# Patient Record
Sex: FEMALE | Race: WHITE | NOT HISPANIC OR LATINO
[De-identification: names, ages, dates, MRNs, and addresses within clinical notes are randomized per-mention and may not be internally consistent; named-entity substitution may affect disease eponyms.]

---

## 2019-09-03 ENCOUNTER — RESULT REVIEW (OUTPATIENT)
Age: 74
End: 2019-09-03

## 2019-09-04 ENCOUNTER — OUTPATIENT (OUTPATIENT)
Dept: OUTPATIENT SERVICES | Facility: HOSPITAL | Age: 74
LOS: 1 days | End: 2019-09-04
Payer: MEDICARE

## 2019-09-04 DIAGNOSIS — E04.2 NONTOXIC MULTINODULAR GOITER: ICD-10-CM

## 2019-09-06 LAB
NON-GYNECOLOGICAL CYTOLOGY STUDY: SIGNIFICANT CHANGE UP
NON-GYNECOLOGICAL CYTOLOGY STUDY: SIGNIFICANT CHANGE UP

## 2019-09-16 PROBLEM — Z00.00 ENCOUNTER FOR PREVENTIVE HEALTH EXAMINATION: Status: ACTIVE | Noted: 2019-09-16

## 2019-10-03 ENCOUNTER — APPOINTMENT (OUTPATIENT)
Dept: OTOLARYNGOLOGY | Facility: CLINIC | Age: 74
End: 2019-10-03

## 2022-09-26 ENCOUNTER — EMERGENCY (EMERGENCY)
Facility: HOSPITAL | Age: 77
LOS: 1 days | Discharge: HOME | End: 2022-09-26
Attending: EMERGENCY MEDICINE | Admitting: EMERGENCY MEDICINE
Payer: MEDICARE

## 2022-09-26 VITALS
SYSTOLIC BLOOD PRESSURE: 135 MMHG | TEMPERATURE: 98 F | RESPIRATION RATE: 18 BRPM | DIASTOLIC BLOOD PRESSURE: 64 MMHG | HEART RATE: 98 BPM

## 2022-09-26 VITALS
OXYGEN SATURATION: 97 % | TEMPERATURE: 98 F | HEART RATE: 92 BPM | RESPIRATION RATE: 18 BRPM | DIASTOLIC BLOOD PRESSURE: 77 MMHG | SYSTOLIC BLOOD PRESSURE: 134 MMHG

## 2022-09-26 PROCEDURE — 70450 CT HEAD/BRAIN W/O DYE: CPT | Mod: MA

## 2022-09-26 PROCEDURE — 70450 CT HEAD/BRAIN W/O DYE: CPT | Mod: 26,MA

## 2022-09-26 PROCEDURE — 99284 EMERGENCY DEPT VISIT MOD MDM: CPT

## 2022-09-26 PROCEDURE — 99284 EMERGENCY DEPT VISIT MOD MDM: CPT | Mod: 25

## 2022-09-26 RX ORDER — APIXABAN 2.5 MG/1
2.5 TABLET, FILM COATED ORAL ONCE
Refills: 0 | Status: DISCONTINUED | OUTPATIENT
Start: 2022-09-26 | End: 2022-09-29

## 2022-09-26 NOTE — ED ADULT NURSE REASSESSMENT NOTE - NS ED NURSE REASSESS COMMENT FT1
Patient is ambulating well without assistance, awaiting transport back to rehab facility. Pt notified of imaging results.
Pt received from previous nurse AAOX4, spont unlab breathing on RA, NAD. PT has no complaints at this time, pt is discharged waiting ambulance txp back to rehab center.

## 2022-09-26 NOTE — ED ADULT NURSE NOTE - OBJECTIVE STATEMENT
Pt is a 77yoF presenting to the ED from [facility] with head injury s/p mech fall. Pt is awake and alert, axox3, spont unlabored breathing on RA. Patient reports that she slipped backwards, +HS, denies LOC. Pt has hematoma to the right side of the head, no active bleeding. Denies vision changes, no numbness/tingling, denies obvious deformity noted, ambulated after the accident. Denies nausea/vomiting, denies abd pain.

## 2022-09-26 NOTE — ED ADULT NURSE NOTE - HOW OFTEN DO YOU HAVE A DRINK CONTAINING ALCOHOL?
Never Aklief Pregnancy And Lactation Text: It is unknown if this medication is safe to use during pregnancy.  It is unknown if this medication is excreted in breast milk.  Breastfeeding women should use the topical cream on the smallest area of the skin for the shortest time needed while breastfeeding.  Do not apply to nipple and areola.

## 2022-09-26 NOTE — ED PROVIDER NOTE - NSFOLLOWUPINSTRUCTIONS_ED_ALL_ED_FT
Head injury    Return for increased pain, change in behavior, change in vision/speech/gait, numbness or weakness in extremities, vomiting, any other concerns.     Keep wound clean and dry.  Use antibiotic ointment on wound 3 times a day.  Return for increased pain, redness/swelling/drainage from wound, fever, any other concerns.     Closed Head Injury    A closed head injury is an injury to your head that may or may not involve a traumatic brain injury (TBI). Symptoms of TBI can be short or long lasting and include headache, dizziness, interference with memory or speech, fatigue, confusion, changes in sleep, mood changes, nausea, depression/anxiety, and dulling of senses. Make sure to obtain proper rest which includes getting plenty of sleep, avoiding excessive visual stimulation, and avoiding activities that may cause physical or mental stress. Avoid any situation where there is potential for another head injury, including sports.    SEEK IMMEDIATE MEDICAL CARE IF YOU HAVE ANY OF THE FOLLOWING SYMPTOMS: unusual drowsiness, vomiting, severe dizziness, seizures, lightheadedness, muscular weakness, different pupil sizes, visual changes, or clear or bloody discharge from your ears or nose.

## 2022-09-26 NOTE — ED PROVIDER NOTE - PATIENT PORTAL LINK FT
You can access the FollowMyHealth Patient Portal offered by [facility] by registering at the following website: [email or web address]. By joining [email or web address] FollowMyHealth portal, you will also be able to view your health information using other applications (apps) compatible with our system.

## 2022-09-26 NOTE — ED PROVIDER NOTE - OBJECTIVE STATEMENT
78 yo F c/o fall 78 yo F h/o afib on eliquis, cad s/p mi, htn, hld, non-hodgkin's lymphoma, c/o fall - pt lost balance when a door she leaned on started to move and fell backwards, struck head.  No loc, ha, change in vision/speech/gait, numbness or weakness in ext, neck/back pain.  + mild R elbow pain/abrasion.  No other injury.  No cp, sob, abd pain, n/v.

## 2022-09-26 NOTE — ED PROVIDER NOTE - NSICDXPASTMEDICALHX_GEN_ALL_CORE_FT
PAST MEDICAL HISTORY:  Afib     CAD (coronary artery disease)     HLD (hyperlipidemia)     HTN (hypertension)     Myocardial infarct     Non-Hodgkin's lymphoma

## 2022-09-28 DIAGNOSIS — Z79.02 LONG TERM (CURRENT) USE OF ANTITHROMBOTICS/ANTIPLATELETS: ICD-10-CM
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
## 2022-09-28 DIAGNOSIS — I48.91 UNSPECIFIED ATRIAL FIBRILLATION: ICD-10-CM

## 2022-09-28 DIAGNOSIS — Z79.82 LONG TERM (CURRENT) USE OF ASPIRIN: ICD-10-CM

## 2022-09-28 DIAGNOSIS — M25.521 PAIN IN RIGHT ELBOW: ICD-10-CM

## 2022-09-28 DIAGNOSIS — I25.10 ATHEROSCLEROTIC HEART DISEASE OF NATIVE CORONARY ARTERY WITHOUT ANGINA PECTORIS: ICD-10-CM

## 2022-09-28 DIAGNOSIS — Z88.5 ALLERGY STATUS TO NARCOTIC AGENT: ICD-10-CM

## 2022-09-28 DIAGNOSIS — S09.90XA UNSPECIFIED INJURY OF HEAD, INITIAL ENCOUNTER: ICD-10-CM

## 2022-09-28 DIAGNOSIS — Z85.71 PERSONAL HISTORY OF HODGKIN LYMPHOMA: ICD-10-CM

## 2022-09-28 DIAGNOSIS — I25.2 OLD MYOCARDIAL INFARCTION: ICD-10-CM
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
## 2022-09-28 DIAGNOSIS — Z79.01 LONG TERM (CURRENT) USE OF ANTICOAGULANTS: ICD-10-CM

## 2022-09-28 DIAGNOSIS — E78.5 HYPERLIPIDEMIA, UNSPECIFIED: ICD-10-CM

## 2023-07-01 ENCOUNTER — INPATIENT (INPATIENT)
Facility: HOSPITAL | Age: 78
LOS: 1 days | Discharge: HOME | DRG: 917 | End: 2023-07-03
Attending: HOSPITALIST | Admitting: STUDENT IN AN ORGANIZED HEALTH CARE EDUCATION/TRAINING PROGRAM
Payer: MEDICARE

## 2023-07-01 VITALS
OXYGEN SATURATION: 93 % | HEART RATE: 109 BPM | SYSTOLIC BLOOD PRESSURE: 109 MMHG | RESPIRATION RATE: 16 BRPM | WEIGHT: 100.09 LBS | HEIGHT: 66 IN | TEMPERATURE: 100 F | DIASTOLIC BLOOD PRESSURE: 67 MMHG

## 2023-07-01 DIAGNOSIS — R09.89 OTHER SPECIFIED SYMPTOMS AND SIGNS INVOLVING THE CIRCULATORY AND RESPIRATORY SYSTEMS: ICD-10-CM

## 2023-07-01 DIAGNOSIS — Z29.9 ENCOUNTER FOR PROPHYLACTIC MEASURES, UNSPECIFIED: ICD-10-CM

## 2023-07-01 DIAGNOSIS — I10 ESSENTIAL (PRIMARY) HYPERTENSION: ICD-10-CM

## 2023-07-01 DIAGNOSIS — R63.0 ANOREXIA: ICD-10-CM

## 2023-07-01 DIAGNOSIS — R41.82 ALTERED MENTAL STATUS, UNSPECIFIED: ICD-10-CM

## 2023-07-01 DIAGNOSIS — R00.0 TACHYCARDIA, UNSPECIFIED: ICD-10-CM

## 2023-07-01 DIAGNOSIS — I25.10 ATHEROSCLEROTIC HEART DISEASE OF NATIVE CORONARY ARTERY WITHOUT ANGINA PECTORIS: ICD-10-CM

## 2023-07-01 DIAGNOSIS — F32.9 MAJOR DEPRESSIVE DISORDER, SINGLE EPISODE, UNSPECIFIED: ICD-10-CM

## 2023-07-01 DIAGNOSIS — E03.9 HYPOTHYROIDISM, UNSPECIFIED: ICD-10-CM

## 2023-07-01 PROBLEM — E78.5 HYPERLIPIDEMIA, UNSPECIFIED: Chronic | Status: ACTIVE | Noted: 2022-09-26

## 2023-07-01 PROBLEM — I21.9 ACUTE MYOCARDIAL INFARCTION, UNSPECIFIED: Chronic | Status: ACTIVE | Noted: 2022-09-26

## 2023-07-01 LAB
ALBUMIN SERPL ELPH-MCNC: 4 G/DL — SIGNIFICANT CHANGE UP (ref 3.3–5)
ALP SERPL-CCNC: 56 U/L — SIGNIFICANT CHANGE UP (ref 40–120)
ALT FLD-CCNC: 12 U/L — SIGNIFICANT CHANGE UP (ref 10–45)
AMPHET UR-MCNC: NEGATIVE — SIGNIFICANT CHANGE UP
ANION GAP SERPL CALC-SCNC: 10 MMOL/L — SIGNIFICANT CHANGE UP (ref 5–17)
ANISOCYTOSIS BLD QL: SLIGHT — SIGNIFICANT CHANGE UP
APAP SERPL-MCNC: <5 UG/ML — LOW (ref 10–30)
APPEARANCE UR: CLEAR — SIGNIFICANT CHANGE UP
APTT BLD: 29.3 SEC — SIGNIFICANT CHANGE UP (ref 27.5–35.5)
AST SERPL-CCNC: 20 U/L — SIGNIFICANT CHANGE UP (ref 10–40)
BARBITURATES UR SCN-MCNC: NEGATIVE — SIGNIFICANT CHANGE UP
BASE EXCESS BLDV CALC-SCNC: 4.5 MMOL/L — HIGH (ref -2–3)
BASOPHILS # BLD AUTO: 0 K/UL — SIGNIFICANT CHANGE UP (ref 0–0.2)
BASOPHILS NFR BLD AUTO: 0 % — SIGNIFICANT CHANGE UP (ref 0–2)
BENZODIAZ UR-MCNC: NEGATIVE — SIGNIFICANT CHANGE UP
BILIRUB SERPL-MCNC: 0.4 MG/DL — SIGNIFICANT CHANGE UP (ref 0.2–1.2)
BILIRUB UR-MCNC: NEGATIVE — SIGNIFICANT CHANGE UP
BUN SERPL-MCNC: 16 MG/DL — SIGNIFICANT CHANGE UP (ref 7–23)
CA-I SERPL-SCNC: 1.21 MMOL/L — SIGNIFICANT CHANGE UP (ref 1.15–1.33)
CALCIUM SERPL-MCNC: 9.5 MG/DL — SIGNIFICANT CHANGE UP (ref 8.4–10.5)
CHLORIDE SERPL-SCNC: 103 MMOL/L — SIGNIFICANT CHANGE UP (ref 96–108)
CK SERPL-CCNC: 60 U/L — SIGNIFICANT CHANGE UP (ref 25–170)
CO2 BLDV-SCNC: 31.2 MMOL/L — HIGH (ref 22–26)
CO2 SERPL-SCNC: 28 MMOL/L — SIGNIFICANT CHANGE UP (ref 22–31)
COCAINE METAB.OTHER UR-MCNC: NEGATIVE — SIGNIFICANT CHANGE UP
COLOR SPEC: YELLOW — SIGNIFICANT CHANGE UP
CREAT SERPL-MCNC: 0.83 MG/DL — SIGNIFICANT CHANGE UP (ref 0.5–1.3)
DIFF PNL FLD: NEGATIVE — SIGNIFICANT CHANGE UP
EGFR: 73 ML/MIN/1.73M2 — SIGNIFICANT CHANGE UP
EOSINOPHIL # BLD AUTO: 0 K/UL — SIGNIFICANT CHANGE UP (ref 0–0.5)
EOSINOPHIL NFR BLD AUTO: 0 % — SIGNIFICANT CHANGE UP (ref 0–6)
ETHANOL SERPL-MCNC: <10 MG/DL — SIGNIFICANT CHANGE UP (ref 0–10)
GAS PNL BLDV: 140 MMOL/L — SIGNIFICANT CHANGE UP (ref 136–145)
GAS PNL BLDV: SIGNIFICANT CHANGE UP
GIANT PLATELETS BLD QL SMEAR: PRESENT — SIGNIFICANT CHANGE UP
GLUCOSE SERPL-MCNC: 117 MG/DL — HIGH (ref 70–99)
GLUCOSE UR QL: NEGATIVE — SIGNIFICANT CHANGE UP
HCO3 BLDV-SCNC: 30 MMOL/L — HIGH (ref 22–29)
HCT VFR BLD CALC: 35.4 % — SIGNIFICANT CHANGE UP (ref 34.5–45)
HGB BLD-MCNC: 11.8 G/DL — SIGNIFICANT CHANGE UP (ref 11.5–15.5)
INR BLD: 1.33 — HIGH (ref 0.88–1.16)
KETONES UR-MCNC: NEGATIVE — SIGNIFICANT CHANGE UP
LACTATE SERPL-SCNC: 0.8 MMOL/L — SIGNIFICANT CHANGE UP (ref 0.5–2)
LEUKOCYTE ESTERASE UR-ACNC: NEGATIVE — SIGNIFICANT CHANGE UP
LYMPHOCYTES # BLD AUTO: 0.41 K/UL — LOW (ref 1–3.3)
LYMPHOCYTES # BLD AUTO: 5.2 % — LOW (ref 13–44)
MACROCYTES BLD QL: SLIGHT — SIGNIFICANT CHANGE UP
MAGNESIUM SERPL-MCNC: 2.3 MG/DL — SIGNIFICANT CHANGE UP (ref 1.6–2.6)
MANUAL SMEAR VERIFICATION: SIGNIFICANT CHANGE UP
MCHC RBC-ENTMCNC: 32.7 PG — SIGNIFICANT CHANGE UP (ref 27–34)
MCHC RBC-ENTMCNC: 33.3 GM/DL — SIGNIFICANT CHANGE UP (ref 32–36)
MCV RBC AUTO: 98.1 FL — SIGNIFICANT CHANGE UP (ref 80–100)
METHADONE UR-MCNC: NEGATIVE — SIGNIFICANT CHANGE UP
MONOCYTES # BLD AUTO: 0.82 K/UL — SIGNIFICANT CHANGE UP (ref 0–0.9)
MONOCYTES NFR BLD AUTO: 10.4 % — SIGNIFICANT CHANGE UP (ref 2–14)
NEUTROPHILS # BLD AUTO: 6.64 K/UL — SIGNIFICANT CHANGE UP (ref 1.8–7.4)
NEUTROPHILS NFR BLD AUTO: 84.4 % — HIGH (ref 43–77)
NITRITE UR-MCNC: NEGATIVE — SIGNIFICANT CHANGE UP
OPIATES UR-MCNC: NEGATIVE — SIGNIFICANT CHANGE UP
OVALOCYTES BLD QL SMEAR: SLIGHT — SIGNIFICANT CHANGE UP
PCO2 BLDV: 46 MMHG — HIGH (ref 39–42)
PCP SPEC-MCNC: SIGNIFICANT CHANGE UP
PCP UR-MCNC: NEGATIVE — SIGNIFICANT CHANGE UP
PH BLDV: 7.42 — SIGNIFICANT CHANGE UP (ref 7.32–7.43)
PH UR: 7.5 — SIGNIFICANT CHANGE UP (ref 5–8)
PLAT MORPH BLD: ABNORMAL
PLATELET # BLD AUTO: 198 K/UL — SIGNIFICANT CHANGE UP (ref 150–400)
PO2 BLDV: 59 MMHG — HIGH (ref 25–45)
POIKILOCYTOSIS BLD QL AUTO: SLIGHT — SIGNIFICANT CHANGE UP
POTASSIUM BLDV-SCNC: 4 MMOL/L — SIGNIFICANT CHANGE UP (ref 3.5–5.1)
POTASSIUM SERPL-MCNC: 4.2 MMOL/L — SIGNIFICANT CHANGE UP (ref 3.5–5.3)
POTASSIUM SERPL-SCNC: 4.2 MMOL/L — SIGNIFICANT CHANGE UP (ref 3.5–5.3)
PROT SERPL-MCNC: 7 G/DL — SIGNIFICANT CHANGE UP (ref 6–8.3)
PROT UR-MCNC: NEGATIVE MG/DL — SIGNIFICANT CHANGE UP
PROTHROM AB SERPL-ACNC: 15.9 SEC — HIGH (ref 10.5–13.4)
RBC # BLD: 3.61 M/UL — LOW (ref 3.8–5.2)
RBC # FLD: 12.6 % — SIGNIFICANT CHANGE UP (ref 10.3–14.5)
RBC BLD AUTO: ABNORMAL
SALICYLATES SERPL-MCNC: <0.3 MG/DL — LOW (ref 2.8–20)
SAO2 % BLDV: 93.4 % — HIGH (ref 67–88)
SODIUM SERPL-SCNC: 141 MMOL/L — SIGNIFICANT CHANGE UP (ref 135–145)
SP GR SPEC: 1.02 — SIGNIFICANT CHANGE UP (ref 1–1.03)
THC UR QL: NEGATIVE — SIGNIFICANT CHANGE UP
TROPONIN T, HIGH SENSITIVITY RESULT: 21 NG/L — SIGNIFICANT CHANGE UP (ref 0–51)
UROBILINOGEN FLD QL: 0.2 E.U./DL — SIGNIFICANT CHANGE UP
WBC # BLD: 7.87 K/UL — SIGNIFICANT CHANGE UP (ref 3.8–10.5)
WBC # FLD AUTO: 7.87 K/UL — SIGNIFICANT CHANGE UP (ref 3.8–10.5)

## 2023-07-01 PROCEDURE — 99285 EMERGENCY DEPT VISIT HI MDM: CPT

## 2023-07-01 PROCEDURE — 70450 CT HEAD/BRAIN W/O DYE: CPT | Mod: 26,MA

## 2023-07-01 PROCEDURE — 71045 X-RAY EXAM CHEST 1 VIEW: CPT | Mod: 26

## 2023-07-01 PROCEDURE — 99223 1ST HOSP IP/OBS HIGH 75: CPT

## 2023-07-01 RX ORDER — LEVOTHYROXINE SODIUM 125 MCG
25 TABLET ORAL DAILY
Refills: 0 | Status: DISCONTINUED | OUTPATIENT
Start: 2023-07-01 | End: 2023-07-03

## 2023-07-01 RX ORDER — CARVEDILOL PHOSPHATE 80 MG/1
3.12 CAPSULE, EXTENDED RELEASE ORAL ONCE
Refills: 0 | Status: COMPLETED | OUTPATIENT
Start: 2023-07-01 | End: 2023-07-01

## 2023-07-01 RX ORDER — APIXABAN 2.5 MG/1
0 TABLET, FILM COATED ORAL
Qty: 0 | Refills: 0 | DISCHARGE

## 2023-07-01 RX ORDER — ATORVASTATIN CALCIUM 80 MG/1
10 TABLET, FILM COATED ORAL AT BEDTIME
Refills: 0 | Status: DISCONTINUED | OUTPATIENT
Start: 2023-07-01 | End: 2023-07-03

## 2023-07-01 RX ORDER — ASPIRIN/CALCIUM CARB/MAGNESIUM 324 MG
0 TABLET ORAL
Qty: 0 | Refills: 0 | DISCHARGE

## 2023-07-01 RX ORDER — ASPIRIN/CALCIUM CARB/MAGNESIUM 324 MG
81 TABLET ORAL ONCE
Refills: 0 | Status: COMPLETED | OUTPATIENT
Start: 2023-07-01 | End: 2023-07-01

## 2023-07-01 RX ORDER — ACETAMINOPHEN 500 MG
650 TABLET ORAL EVERY 6 HOURS
Refills: 0 | Status: DISCONTINUED | OUTPATIENT
Start: 2023-07-01 | End: 2023-07-03

## 2023-07-01 RX ORDER — ATORVASTATIN CALCIUM 80 MG/1
0 TABLET, FILM COATED ORAL
Qty: 0 | Refills: 0 | DISCHARGE

## 2023-07-01 RX ORDER — CARVEDILOL PHOSPHATE 80 MG/1
0 CAPSULE, EXTENDED RELEASE ORAL
Qty: 0 | Refills: 0 | DISCHARGE

## 2023-07-01 RX ORDER — CLOPIDOGREL BISULFATE 75 MG/1
75 TABLET, FILM COATED ORAL DAILY
Refills: 0 | Status: DISCONTINUED | OUTPATIENT
Start: 2023-07-01 | End: 2023-07-03

## 2023-07-01 RX ORDER — LANOLIN ALCOHOL/MO/W.PET/CERES
3 CREAM (GRAM) TOPICAL AT BEDTIME
Refills: 0 | Status: DISCONTINUED | OUTPATIENT
Start: 2023-07-01 | End: 2023-07-03

## 2023-07-01 RX ORDER — ENOXAPARIN SODIUM 100 MG/ML
30 INJECTION SUBCUTANEOUS EVERY 24 HOURS
Refills: 0 | Status: DISCONTINUED | OUTPATIENT
Start: 2023-07-01 | End: 2023-07-03

## 2023-07-01 RX ORDER — MIRTAZAPINE 45 MG/1
0 TABLET, ORALLY DISINTEGRATING ORAL
Qty: 0 | Refills: 0 | DISCHARGE

## 2023-07-01 RX ORDER — CLOPIDOGREL BISULFATE 75 MG/1
0 TABLET, FILM COATED ORAL
Qty: 0 | Refills: 0 | DISCHARGE

## 2023-07-01 RX ORDER — VENLAFAXINE HCL 75 MG
0 CAPSULE, EXT RELEASE 24 HR ORAL
Qty: 0 | Refills: 0 | DISCHARGE

## 2023-07-01 RX ORDER — SODIUM CHLORIDE 9 MG/ML
250 INJECTION INTRAMUSCULAR; INTRAVENOUS; SUBCUTANEOUS ONCE
Refills: 0 | Status: COMPLETED | OUTPATIENT
Start: 2023-07-01 | End: 2023-07-01

## 2023-07-01 RX ORDER — SACUBITRIL AND VALSARTAN 24; 26 MG/1; MG/1
1 TABLET, FILM COATED ORAL
Refills: 0 | Status: DISCONTINUED | OUTPATIENT
Start: 2023-07-01 | End: 2023-07-01

## 2023-07-01 RX ORDER — SODIUM CHLORIDE 9 MG/ML
1000 INJECTION INTRAMUSCULAR; INTRAVENOUS; SUBCUTANEOUS ONCE
Refills: 0 | Status: COMPLETED | OUTPATIENT
Start: 2023-07-01 | End: 2023-07-01

## 2023-07-01 RX ORDER — ACETAMINOPHEN 500 MG
650 TABLET ORAL ONCE
Refills: 0 | Status: COMPLETED | OUTPATIENT
Start: 2023-07-01 | End: 2023-07-01

## 2023-07-01 RX ADMIN — CLOPIDOGREL BISULFATE 75 MILLIGRAM(S): 75 TABLET, FILM COATED ORAL at 16:08

## 2023-07-01 RX ADMIN — SODIUM CHLORIDE 500 MILLILITER(S): 9 INJECTION INTRAMUSCULAR; INTRAVENOUS; SUBCUTANEOUS at 19:35

## 2023-07-01 RX ADMIN — ENOXAPARIN SODIUM 30 MILLIGRAM(S): 100 INJECTION SUBCUTANEOUS at 22:46

## 2023-07-01 RX ADMIN — Medication 650 MILLIGRAM(S): at 17:04

## 2023-07-01 RX ADMIN — SODIUM CHLORIDE 1000 MILLILITER(S): 9 INJECTION INTRAMUSCULAR; INTRAVENOUS; SUBCUTANEOUS at 12:46

## 2023-07-01 RX ADMIN — Medication 650 MILLIGRAM(S): at 20:10

## 2023-07-01 RX ADMIN — SACUBITRIL AND VALSARTAN 1 TABLET(S): 24; 26 TABLET, FILM COATED ORAL at 17:04

## 2023-07-01 RX ADMIN — Medication 81 MILLIGRAM(S): at 16:09

## 2023-07-01 RX ADMIN — CARVEDILOL PHOSPHATE 3.12 MILLIGRAM(S): 80 CAPSULE, EXTENDED RELEASE ORAL at 16:08

## 2023-07-01 RX ADMIN — ATORVASTATIN CALCIUM 10 MILLIGRAM(S): 80 TABLET, FILM COATED ORAL at 22:47

## 2023-07-01 RX ADMIN — Medication 3 MILLIGRAM(S): at 22:48

## 2023-07-01 NOTE — ED ADULT NURSE NOTE - OBJECTIVE STATEMENT
patient comes in with AMS from home, son unable to reach via phone, patient was found lethargic, had episode of unresponsiveness, PERRLA, not pinpoint, patient responding appropriately to questions, denies SI,SA, AH, CH, HI, does not recall anything this morning, no obvious signs of trauma, no focal deficits

## 2023-07-01 NOTE — H&P ADULT - PROBLEM SELECTOR PLAN 2
HR elevated to 103.     Plan: HR elevated to 125->103. EKG on admission normal sinus. Troponin negative. Etiology unclear, possible sepsis vs dehydration vs anxiety vs metabolic.     Plan:  Repeat EKG in AM if persistently tachycardic  - F/u TSH  - Hold anxiety medications given AMS  - F/u blood cultures

## 2023-07-01 NOTE — H&P ADULT - ATTENDING COMMENTS
78 yo F with PMHx anxiety, depression, anorexia nervosa BIBEMS from home after being found down lethargic next to empty bottle of mirtazapine admitted for possible unintentional overdose.      #Overdose – Pt denies intentional overdose, SI sx. Lethargic on the scene but by ED arrival, awake/alert. Per son, still not at baseline and remains confused. No focal neurological deficits on exam. Outpt pychiatrist contacted by ED, no response on vacation. UTox, Tylenol, Salicylate, BAL negative. Enhanced obs. Psychiatry consult in AM. Hold sedating medications. Monitor for BZD withdrawal.      #Dysphagia – Failed bedside dysphagia in ED. Awake/alert following commands and protecting airway. Speech/swallow consult in AM.      #Misc – PT consult      Agree with resident plan as above.

## 2023-07-01 NOTE — H&P ADULT - PROBLEM SELECTOR PLAN 5
HX of depression.     Plan   -C/w home Mirtazapine, Olanzepine, Viibryd, Klonopin HX of depression.     Plan   -hold home Mirtazapine, Olanzepine, Viibryd, Klonopin

## 2023-07-01 NOTE — H&P ADULT - PROBLEM SELECTOR PLAN 3
BP ranges from 188/104 to 88/53 measured at bedside. s/p carvedilol, Entresto and 1L NS in the ED    Plan:   -NS 250cc bolus and reassess BP ranges from 188/104 to 88/53 measured at bedside. s/p carvedilol, Entresto and 1L NS in the ED. Pt mentating well, asymptomatic.    Plan:   -NS 250cc bolus and reassess  - Hold off on home antihypertensives given systolics in 80-90s

## 2023-07-01 NOTE — H&P ADULT - ASSESSMENT
Patient is a 77y.o F with PMH of MI (Sept 2022), depression, anorexia who present with altered mental status since today. Patient is a 77y.o F with PMH of MI (Sept 2022), depression, anorexia who present with altered mental status since today after being found lethargic at home by EMS with 2 empty bottles of medications next to her. She was found to have Utox negative, WBC wnm, afebrile, negative CXR, negative CT head, Pt was admitted for AMS.

## 2023-07-01 NOTE — ED ADULT TRIAGE NOTE - CHIEF COMPLAINT QUOTE
Pt presents to ED C/O AMS EMS states, "She was found at home lethargic with two empty bottles of medication next to her, one unlabeled and one was mirtazapine, the patient friend said she has hx of depression, she went unresponsive on scene but still had a pulse". Upon arrival pt awake, lethargic A&Ox4, protecting own airway, denies drug or alcohol use, denies SI,HI.

## 2023-07-01 NOTE — H&P ADULT - PROBLEM SELECTOR PLAN 8
F: s/p 1.25 L in ED  E: Replete as needed  N; Clear liquid diet pending speech and swallow  Dvt ppx: lovenox  Code status: FULL code  Dispo: F

## 2023-07-01 NOTE — H&P ADULT - NSHPPHYSICALEXAM_GEN_ALL_CORE
T(C): 37.3 (07-01-23 @ 19:13), Max: 37.8 (07-01-23 @ 11:03)  HR: 103 (07-01-23 @ 19:13) (96 - 125)  BP: 93/57 (07-01-23 @ 19:13) (93/57 - 188/104)  RR: 16 (07-01-23 @ 19:13) (16 - 18)  SpO2: 96% (07-01-23 @ 19:13) (93% - 98%)    CONSTITUTIONAL: Well groomed, no apparent distress. Small body habitus   EYES: PERRLA and symmetric, EOMI, No conjunctival or scleral injection, non-icteric  ENMT: Oral mucosa with moist membranes. Normal dentition; no pharyngeal injection or exudates             NECK: Supple, symmetric and without tracheal deviation   RESP: No respiratory distress, no use of accessory muscles; CTA b/l, no WRR  CV: RRR, +S1S2, no MRG; no JVD; no peripheral edema  GI: Soft, NT, ND, no rebound, no guarding; no palpable masses; no hepatosplenomegaly; no hernia palpated  LYMPH: No cervical LAD or tenderness; no axillary LAD or tenderness; no inguinal LAD or tenderness  MSK: Normal gait; No digital clubbing or cyanosis; examination of the (head/neck/spine/ribs/pelvis, RUE, LUE, RLE, LLE) without misalignment,            Normal ROM without pain, no spinal tenderness, normal muscle strength/tone  SKIN: No rashes or ulcers noted; no subcutaneous nodules or induration palpable  NEURO: CN II-XII intact; normal reflexes in upper and lower extremities, sensation intact in upper and lower extremities b/l to light touch   PSYCH: Appropriate insight/judgment; A+O x 3, mood and affect appropriate, recent/remote memory intact T(C): 37.3 (07-01-23 @ 19:13), Max: 37.8 (07-01-23 @ 11:03)  HR: 103 (07-01-23 @ 19:13) (96 - 125)  BP: 93/57 (07-01-23 @ 19:13) (93/57 - 188/104)  RR: 16 (07-01-23 @ 19:13) (16 - 18)  SpO2: 96% (07-01-23 @ 19:13) (93% - 98%)    CONSTITUTIONAL: Well groomed, no apparent distress. Small body habitus   EYES: PERRLA and symmetric, EOMI, No conjunctival or scleral injection, non-icteric  HEAD: evidence of prior lip fillings and botox injections  ENMT: Oral mucosa with moist membranes. Normal dentition; no pharyngeal injection or exudates             NECK: Supple, symmetric and without tracheal deviation   RESP: No respiratory distress, no use of accessory muscles; CTA b/l, no WRR  CV: RRR, +S1S2, no MRG; no JVD; no peripheral edema  GI: Soft, NT, ND, no rebound, no guarding; no palpable masses; no hepatosplenomegaly; no hernia palpated  LYMPH: No cervical LAD or tenderness; no axillary LAD or tenderness; no inguinal LAD or tenderness  MSK: No digital clubbing or cyanosis; examination of the (head/neck/spine/ribs/pelvis, RUE, LUE, RLE, LLE) without misalignment,   SKIN: No rashes or ulcers noted; no subcutaneous nodules or induration palpable  NEURO: CN II-XII intact; normal reflexes in upper and lower extremities, sensation intact in upper and lower extremities b/l to light touch, no focal neuro deficit  PSYCH: Appropriate insight/judgment; A+O x 3, mood and affect appropriate, recent/remote memory intact

## 2023-07-01 NOTE — H&P ADULT - PROBLEM SELECTOR PLAN 4
HX of MI in Sep 2022.     Plan:   -C/w home atorvastatin and clopidogrel HX of MI in Sep 2022. No stents placed.    Plan:   -C/w home atorvastatin and clopidogrel

## 2023-07-01 NOTE — ED PROVIDER NOTE - OBJECTIVE STATEMENT
2 empty bottles in room, one of mirtazipine, pt denied OD to EMS and to us.  our system shows aspirin, atorvastatin, carvedilol, clonazepam, clopidogrel, eliquis and prilosec. neither patient or son remember any other meds. call left w/ psychiatrist, currently away on vacation  pt denies intentional overdose, states could have accidentally taken an extra dose but doesn't recall. denies etoh/drug use. 77y.o F with PMH of MI (Sept 2022), depression followed by psych with many med changes over the years (most recent is increase in remeron from 30 to 60 at night), anorexia that present with altered mental status since today. Per her son, the patient's friend went to check on her today and found her in bed, awake but not responding to questions. She then was brought here to the ED by EMS. EMS states no seizure like activity, found pt in bed naked. Pt recalls this, states she woke up and the lights were on and people were all around her. Son reports that patient didn't  the phone all morning so he called a wellness check on her. He confirms that she lives alone, and at baseline is able to ambulate without assistance. Both she and son deny hx of suicidal attempts or ideation. Pt states that remeron bottle empty because of increased dose, maybe took an extra one by accident but doesn't recall doing so, but denies intentional overdose. States her depression is long standing and while distressing, has been stable.     2 empty bottles in room, one of mirtazipine, pt denied OD to EMS and to us.  our system shows aspirin, atorvastatin, carvedilol, clonazepam, clopidogrel, eliquis and prilosec. neither patient or son remember any other meds. call left w/ psychiatrist, currently away on vacation  pt denies intentional overdose, states could have accidentally taken an extra dose but doesn't recall. denies etoh/drug use.

## 2023-07-01 NOTE — H&P ADULT - HISTORY OF PRESENT ILLNESS
Patient is a 77y.o F with PMH of MI (Sept 2022), depression, anorexia that present with altered mental status since today. Per her son, the patient's friend went to check on her today and found her in bed, awake but not responding to questions. She then was brought here to the ED by EMS. He reports that patient lives alone, and at baseline is able to ambulate without assistance. Since coming to the ED, he reports that he has been able to speak to her and she seemed confused. When trying to ambulate in the ED, she seemed to  Patient is a 77y.o F with PMH of MI (Sept 2022), depression, anorexia that present with altered mental status since today. Per her son, the patient's friend went to check on her today and found her in bed, awake but not responding to questions. She then was brought here to the ED by EMS. He reports that patient lives alone, and at baseline is able to ambulate without assistance . Since coming to the ED, he reports that he has been able to speak to her and she seemed confused. When trying to ambulate in the ED, she seemed to lose balance. He denies known SI or suicide attempts but does report that she has had worsening depression in the last few months requiring changes to her meds.   When reassessing patient, she is now alert and oriented, She notes she spoke to her son earlier today, and last thing she remembers is being in bed in the morning and then waking up to the EMS in her room. She has never had a similar episode before.     In the ED:   VS: /67, , T 100, RR 18, SPO2 96 RA  Labs: Significant for PT 15.9, INR 1.33,  Of note: Electrolytes WNL, UA, Utox-, Troponin wnl, CK-   She was received Aspirin, Carvedilol Tylenol, clopidogrel, Entresto and 1 L IV NS  Patient is a 77y.o F with PMH of MI (Sept 2022), depression, anorexia that present with altered mental status since today. Per EMS, she was found at home lethargic with two empty bottles of medication next to her, one unlabeled and one was mirtazapine. Per the patient's son, the patient's friend went to check on her today and found her in bed, awake but not responding to questions. She then was brought here to the ED by EMS. He reports that patient lives alone, and at baseline is able to ambulate without assistance . Since coming to the ED, he reports that he has been able to speak to her and she seemed confused. When trying to ambulate in the ED, she seemed to lose balance. He denies known SI or suicide attempts but does report that she has had worsening depression in the last few months requiring changes to her meds.   When reassessing patient, she is now alert and oriented, She notes she spoke to her son earlier today, and last thing she remembers is being in bed in the morning and then waking up to the EMS in her room. She has never had a similar episode before.     In the ED:   VS: /67, , T 100, RR 18, SPO2 96 RA  Labs: Significant for PT 15.9, INR 1.33,  Of note: Electrolytes WNL, UA, Utox-, Troponin wnl, CK-   She was received Aspirin, Carvedilol Tylenol, clopidogrel, Entresto and 1 L IV NS

## 2023-07-01 NOTE — H&P ADULT - PROBLEM SELECTOR PLAN 7
Hx of hypothyroidism    Plan:   -Cont with Levothyroxine 25mcg Hx of hypothyroidism    Plan:   -Cont with Levothyroxine 25mcg  - F/u TSH in AM

## 2023-07-01 NOTE — ED PROVIDER NOTE - CLINICAL SUMMARY MEDICAL DECISION MAKING FREE TEXT BOX
workup in ED neg  clinical exam labs and VS not consistent with overdose but she may have been confused 2/2 new increase in remeron  pt did not take any of her home meds today, both she and son very distressed about missing her cardiac meds, later during ED stay became more tachycardic and hypertensive, so pts home dose carvedilol and entresto and asa/plavix ordered.  unable to ambulate. ct head done and negative.   pt appears much more dehydrated than I would expect from one night. may be failing much more at home than son realizes. SW/CM and PT involved from ED. infection still on differential though ED workup negative - blood cultures were sent.   initally in ED stay pt A&Ox4, but later on was confused - likely delirium given waxing/waning. needs admission.

## 2023-07-01 NOTE — ED PROVIDER NOTE - PHYSICAL EXAMINATION
CONSTITUTIONAL: Well-appearing; in no apparent distress.   HEAD: Normocephalic; atraumatic.   EYES:  conjunctiva and sclera clear  ENT: normal nose; no rhinorrhea; normal pharynx with no erythema or lesions.   NECK: Supple; non-tender;   CARDIOVASCULAR: Normal S1, S2; no murmurs, rubs, or gallops. Regular rate and rhythm.   RESPIRATORY: Breathing easily; breath sounds clear and equal bilaterally; no wheezes, rhonchi, or rales.  GI: Soft; non-distended; non-tender; no palpable organomegaly.   EXT: No cyanosis or edema; N/V intact  SKIN: Normal for age and race; warm; dry; good turgor; no apparent lesions or rash.   NEURO: A & O x 4; face symmetric; grossly unremarkable.   PSYCHOLOGICAL: The patient’s mood and manner are appropriate. CONSTITUTIONAL: Frail, elderly, in no apparent distress.   HEAD: Normocephalic; atraumatic.   EYES:  conjunctiva and sclera clear  ENT: normal nose; no rhinorrhea; normal pharynx with no erythema or lesions. Very dry cracked lips and mucous membranes  NECK: Supple; non-tender;   CARDIOVASCULAR: Normal S1, S2; no murmurs, rubs, or gallops. Regular rate and rhythm.   RESPIRATORY: Breathing easily; breath sounds clear and equal bilaterally; no wheezes, rhonchi, or rales.  GI: Soft; non-distended; non-tender; no palpable organomegaly.   EXT: No cyanosis or edema; N/V intact  SKIN: Normal for age and race; warm; dry; good turgor; no apparent lesions or rash.   NEURO: A & O x 4; face symmetric; motor/sensory intact, speech clear and coherent, unable to walk steadily   PSYCHOLOGICAL: The patient’s mood and manner are appropriate.

## 2023-07-01 NOTE — H&P ADULT - PROBLEM SELECTOR PLAN 6
Small body habitus.     Plan:   -Nutrition consult Small body habitus, BMI 16.2    Plan:   -Nutrition consult

## 2023-07-01 NOTE — H&P ADULT - PROBLEM SELECTOR PLAN 1
Pt presents with AMS since this morning. Infectious vs thyroid vs metabolic vs TIA.   -WBC wnl, afebrile, CXR shows no infiltrate , UA neg.   -Electrolytes wnl, U tox-  -Hx of CAD, however no focal deficit, low suspicion for TIA     Plan:   -F/u Blood cultures   -Phosphorus level Pt presents with AMS since this morning. Infectious vs thyroid vs metabolic vs TIA vs medication  induced  -WBC wnl, afebrile, CXR shows no infiltrate , UA neg.   -Electrolytes wnl, U tox-  -Hx of CAD, however no focal deficit, low suspicion for TIA. CT head negative.    Plan:   -F/u Blood cultures   -Phosphorus level  - F/u TSH  - holding home olanzapine, mirtazipine, vilazodone Pt presents with AMS since this morning, found by EMS lethargic with 2 empty bottles of medications, one unlabeled, the other mirtazepine. Infectious vs thyroid vs metabolic vs TIA vs medication  induced  -WBC wnl, afebrile, CXR shows no infiltrate , UA neg.   -Electrolytes wnl, U tox-  -Hx of CAD, however no focal deficit, low suspicion for TIA. CT head negative.    Plan:   -Enhanced observation   -F/u Blood cultures   -Phosphorus level  -f/u CMP  - F/u TSH  - holding home olanzapine, mirtazipine, vilazodone  -Psych consult  -PT consult

## 2023-07-01 NOTE — H&P ADULT - NSICDXPASTMEDICALHX_GEN_ALL_CORE_FT
PAST MEDICAL HISTORY:  Afib     Anorexia     CAD (coronary artery disease)     HLD (hyperlipidemia)     HTN (hypertension)     Major depression     Myocardial infarct     Non-Hodgkin's lymphoma      PAST MEDICAL HISTORY:  Anorexia     CAD (coronary artery disease)     HLD (hyperlipidemia)     HTN (hypertension)     Major depression     Myocardial infarct

## 2023-07-02 DIAGNOSIS — G92 TOXIC ENCEPHALOPATHY: ICD-10-CM

## 2023-07-02 DIAGNOSIS — E43 UNSPECIFIED SEVERE PROTEIN-CALORIE MALNUTRITION: ICD-10-CM

## 2023-07-02 LAB
ALBUMIN SERPL ELPH-MCNC: 3.4 G/DL — SIGNIFICANT CHANGE UP (ref 3.3–5)
ALP SERPL-CCNC: 45 U/L — SIGNIFICANT CHANGE UP (ref 40–120)
ALT FLD-CCNC: 10 U/L — SIGNIFICANT CHANGE UP (ref 10–45)
ANION GAP SERPL CALC-SCNC: 9 MMOL/L — SIGNIFICANT CHANGE UP (ref 5–17)
AST SERPL-CCNC: 16 U/L — SIGNIFICANT CHANGE UP (ref 10–40)
BASOPHILS # BLD AUTO: 0.02 K/UL — SIGNIFICANT CHANGE UP (ref 0–0.2)
BASOPHILS NFR BLD AUTO: 0.2 % — SIGNIFICANT CHANGE UP (ref 0–2)
BILIRUB SERPL-MCNC: 0.5 MG/DL — SIGNIFICANT CHANGE UP (ref 0.2–1.2)
BUN SERPL-MCNC: 20 MG/DL — SIGNIFICANT CHANGE UP (ref 7–23)
CALCIUM SERPL-MCNC: 8.6 MG/DL — SIGNIFICANT CHANGE UP (ref 8.4–10.5)
CHLORIDE SERPL-SCNC: 108 MMOL/L — SIGNIFICANT CHANGE UP (ref 96–108)
CO2 SERPL-SCNC: 26 MMOL/L — SIGNIFICANT CHANGE UP (ref 22–31)
CREAT SERPL-MCNC: 0.67 MG/DL — SIGNIFICANT CHANGE UP (ref 0.5–1.3)
EGFR: 91 ML/MIN/1.73M2 — SIGNIFICANT CHANGE UP
EOSINOPHIL # BLD AUTO: 0.01 K/UL — SIGNIFICANT CHANGE UP (ref 0–0.5)
EOSINOPHIL NFR BLD AUTO: 0.1 % — SIGNIFICANT CHANGE UP (ref 0–6)
GLUCOSE SERPL-MCNC: 116 MG/DL — HIGH (ref 70–99)
HCT VFR BLD CALC: 32.5 % — LOW (ref 34.5–45)
HCV AB S/CO SERPL IA: 0.04 S/CO — SIGNIFICANT CHANGE UP
HCV AB SERPL-IMP: SIGNIFICANT CHANGE UP
HGB BLD-MCNC: 10.4 G/DL — LOW (ref 11.5–15.5)
IMM GRANULOCYTES NFR BLD AUTO: 0.4 % — SIGNIFICANT CHANGE UP (ref 0–0.9)
LYMPHOCYTES # BLD AUTO: 0.53 K/UL — LOW (ref 1–3.3)
LYMPHOCYTES # BLD AUTO: 4.5 % — LOW (ref 13–44)
MAGNESIUM SERPL-MCNC: 2 MG/DL — SIGNIFICANT CHANGE UP (ref 1.6–2.6)
MCHC RBC-ENTMCNC: 31.7 PG — SIGNIFICANT CHANGE UP (ref 27–34)
MCHC RBC-ENTMCNC: 32 GM/DL — SIGNIFICANT CHANGE UP (ref 32–36)
MCV RBC AUTO: 99.1 FL — SIGNIFICANT CHANGE UP (ref 80–100)
MONOCYTES # BLD AUTO: 1.18 K/UL — HIGH (ref 0–0.9)
MONOCYTES NFR BLD AUTO: 10 % — SIGNIFICANT CHANGE UP (ref 2–14)
NEUTROPHILS # BLD AUTO: 9.96 K/UL — HIGH (ref 1.8–7.4)
NEUTROPHILS NFR BLD AUTO: 84.8 % — HIGH (ref 43–77)
NRBC # BLD: 0 /100 WBCS — SIGNIFICANT CHANGE UP (ref 0–0)
PHOSPHATE SERPL-MCNC: 2.6 MG/DL — SIGNIFICANT CHANGE UP (ref 2.5–4.5)
PLATELET # BLD AUTO: 164 K/UL — SIGNIFICANT CHANGE UP (ref 150–400)
POTASSIUM SERPL-MCNC: 3.9 MMOL/L — SIGNIFICANT CHANGE UP (ref 3.5–5.3)
POTASSIUM SERPL-SCNC: 3.9 MMOL/L — SIGNIFICANT CHANGE UP (ref 3.5–5.3)
PROT SERPL-MCNC: 6 G/DL — SIGNIFICANT CHANGE UP (ref 6–8.3)
RAPID RVP RESULT: SIGNIFICANT CHANGE UP
RBC # BLD: 3.28 M/UL — LOW (ref 3.8–5.2)
RBC # FLD: 13.1 % — SIGNIFICANT CHANGE UP (ref 10.3–14.5)
SARS-COV-2 RNA SPEC QL NAA+PROBE: SIGNIFICANT CHANGE UP
SODIUM SERPL-SCNC: 143 MMOL/L — SIGNIFICANT CHANGE UP (ref 135–145)
T4 FREE+ TSH PNL SERPL: 1.44 UIU/ML — SIGNIFICANT CHANGE UP (ref 0.27–4.2)
WBC # BLD: 11.75 K/UL — HIGH (ref 3.8–10.5)
WBC # FLD AUTO: 11.75 K/UL — HIGH (ref 3.8–10.5)

## 2023-07-02 PROCEDURE — 99221 1ST HOSP IP/OBS SF/LOW 40: CPT

## 2023-07-02 PROCEDURE — 99233 SBSQ HOSP IP/OBS HIGH 50: CPT | Mod: GC

## 2023-07-02 PROCEDURE — 93010 ELECTROCARDIOGRAM REPORT: CPT

## 2023-07-02 RX ORDER — SENNA PLUS 8.6 MG/1
2 TABLET ORAL AT BEDTIME
Refills: 0 | Status: DISCONTINUED | OUTPATIENT
Start: 2023-07-02 | End: 2023-07-03

## 2023-07-02 RX ORDER — ACETAMINOPHEN 500 MG
650 TABLET ORAL ONCE
Refills: 0 | Status: COMPLETED | OUTPATIENT
Start: 2023-07-02 | End: 2023-07-02

## 2023-07-02 RX ORDER — POLYETHYLENE GLYCOL 3350 17 G/17G
17 POWDER, FOR SOLUTION ORAL EVERY 12 HOURS
Refills: 0 | Status: DISCONTINUED | OUTPATIENT
Start: 2023-07-02 | End: 2023-07-03

## 2023-07-02 RX ORDER — CARVEDILOL PHOSPHATE 80 MG/1
3.12 CAPSULE, EXTENDED RELEASE ORAL EVERY 12 HOURS
Refills: 0 | Status: DISCONTINUED | OUTPATIENT
Start: 2023-07-02 | End: 2023-07-03

## 2023-07-02 RX ORDER — MIRTAZAPINE 45 MG/1
7.5 TABLET, ORALLY DISINTEGRATING ORAL AT BEDTIME
Refills: 0 | Status: DISCONTINUED | OUTPATIENT
Start: 2023-07-02 | End: 2023-07-03

## 2023-07-02 RX ORDER — SACUBITRIL AND VALSARTAN 24; 26 MG/1; MG/1
1 TABLET, FILM COATED ORAL
Refills: 0 | Status: DISCONTINUED | OUTPATIENT
Start: 2023-07-03 | End: 2023-07-03

## 2023-07-02 RX ADMIN — Medication 650 MILLIGRAM(S): at 13:13

## 2023-07-02 RX ADMIN — Medication 25 MICROGRAM(S): at 06:42

## 2023-07-02 RX ADMIN — Medication 650 MILLIGRAM(S): at 14:14

## 2023-07-02 RX ADMIN — Medication 650 MILLIGRAM(S): at 18:37

## 2023-07-02 RX ADMIN — MIRTAZAPINE 7.5 MILLIGRAM(S): 45 TABLET, ORALLY DISINTEGRATING ORAL at 21:28

## 2023-07-02 RX ADMIN — CLOPIDOGREL BISULFATE 75 MILLIGRAM(S): 75 TABLET, FILM COATED ORAL at 11:45

## 2023-07-02 RX ADMIN — ATORVASTATIN CALCIUM 10 MILLIGRAM(S): 80 TABLET, FILM COATED ORAL at 21:28

## 2023-07-02 RX ADMIN — CARVEDILOL PHOSPHATE 3.12 MILLIGRAM(S): 80 CAPSULE, EXTENDED RELEASE ORAL at 18:37

## 2023-07-02 RX ADMIN — SENNA PLUS 2 TABLET(S): 8.6 TABLET ORAL at 21:28

## 2023-07-02 RX ADMIN — ENOXAPARIN SODIUM 30 MILLIGRAM(S): 100 INJECTION SUBCUTANEOUS at 21:28

## 2023-07-02 RX ADMIN — POLYETHYLENE GLYCOL 3350 17 GRAM(S): 17 POWDER, FOR SOLUTION ORAL at 18:38

## 2023-07-02 NOTE — PROGRESS NOTE ADULT - PROBLEM SELECTOR PLAN 3
BP ranges from 188/104 to 88/53 measured at bedside. s/p carvedilol, Entresto and 1L NS in the ED. Pt mentating well, asymptomatic.    Plan:   -c/w home meds

## 2023-07-02 NOTE — DIETITIAN INITIAL EVALUATION ADULT - PROBLEM SELECTOR PLAN 2
HR elevated to 125->103. EKG on admission normal sinus. Troponin negative. Etiology unclear, possible sepsis vs dehydration vs anxiety vs metabolic.     Plan:  Repeat EKG in AM if persistently tachycardic  - F/u TSH  - Hold anxiety medications given AMS  - F/u blood cultures

## 2023-07-02 NOTE — SWALLOW BEDSIDE ASSESSMENT ADULT - SLP GENERAL OBSERVATIONS
Pt appreciated awake and alert in bed on RA, son at bedside. Pt able to follow commands. Patient participated in conversation, however son provided collateral re recent swallowing difficulties.

## 2023-07-02 NOTE — DIETITIAN NUTRITION RISK NOTIFICATION - ADDITIONAL COMMENTS/DIETITIAN RECOMMENDATIONS
1. Advance diet to regular  2. Add [employer] Standard 1.4 protein shakes 2x daily (each 455kcal/20gpro)  3. Encourage PO intake and monitor tolerance  4. RD to remain available pr

## 2023-07-02 NOTE — PHYSICAL THERAPY INITIAL EVALUATION ADULT - ADDITIONAL COMMENTS
Pt currently resides alone in elevator apt, no GUNJAN. Primarily amb indep w/o AD. Denied falls within past 6 months. Indep w/ ADL and iADL tasks prior to [facility] admission.

## 2023-07-02 NOTE — PROGRESS NOTE ADULT - PROBLEM SELECTOR PLAN 2
HR elevated to 125->103. EKG on admission normal sinus. Troponin negative. Etiology unclear, possible sepsis vs dehydration vs anxiety vs metabolic. TSH wnl    Plan:  Repeat EKG in AM if persistently tachycardic  - Hold anxiety medications given AMS  - F/u blood cultures

## 2023-07-02 NOTE — BH CONSULTATION LIAISON ASSESSMENT NOTE - NSBHCHARTREVIEWVS_PSY_A_CORE FT
Vital Signs Last 24 Hrs  T(C): 38 (02 Jul 2023 15:17), Max: 38 (02 Jul 2023 15:17)  T(F): 100.4 (02 Jul 2023 15:17), Max: 100.4 (02 Jul 2023 15:17)  HR: 98 (02 Jul 2023 15:17) (98 - 107)  BP: 116/70 (02 Jul 2023 15:17) (93/57 - 130/71)  BP(mean): 67 (01 Jul 2023 22:15) (67 - 67)  RR: 18 (02 Jul 2023 15:17) (16 - 18)  SpO2: 96% (02 Jul 2023 15:17) (94% - 98%)    Parameters below as of 02 Jul 2023 15:17  Patient On (Oxygen Delivery Method): room air

## 2023-07-02 NOTE — PROGRESS NOTE ADULT - ATTENDING COMMENTS
Patient was seen and examined at bedside on 7/2/2023 at 11 am. Patient has no acute complaints. Denies SOB, CP. ROS is otherwise negative. Vitals, labwork and pertinent imaging reviewed. Physical exam - NAD, AAO x 4, PERRLA, EOMI, supple neck, chest - CTA b/l, CV - rrr, s1s2, no m/r/g, abd - soft, + BS, ext - wwp, psych - normal affect, skin - no rash, patient appears cachectic and malnourished    Plan  -Nutrition consulted  -Psych consulted   -Unclear etiology of tachycardia but persistent - check rectal temp, EKG, TSH - wnl, check orthostatics  -PVR  -Aggressive bowel regimen  -Med rec as follows:  Remeron, Zyprexa, Venlafaxine, Klonopin .5 mg PO PRN, Viibryd  Coreg, Entresto, Norvasc 2.5 PO qd  Synthroid  Eliquis 5 mg BID? -- > need to confirm

## 2023-07-02 NOTE — SWALLOW BEDSIDE ASSESSMENT ADULT - SWALLOW EVAL: DIAGNOSIS
Functional oropharyngeal swallow. Suspect reduced PO tolerance occurred as a result of AMS, which has largely resolved. Pt is safe to consume an unrestricted diet. Should patient demonstrate overt s/s of aspiration with PO, please consult this svc

## 2023-07-02 NOTE — DIETITIAN INITIAL EVALUATION ADULT - OTHER INFO
77y.o F with PMH of MI (Sept 2022), depression, anorexia who present with altered mental status since today after being found lethargic at home by EMS with 2 empty bottles of medications next to her. She was found to have Utox negative, WBC wnm, afebrile, negative CXR, negative CT head, Pt was admitted for AMS    Pt seen in room for nutrition assessment, son Benjamin at bedside. Pt reports eating ~2-3 small meals per day, per diet recall likes grilled cheese, pasta, Chinese food. Pt reports she has gained weight recently, although son questions accuracy of this. Pt reports her UBW ~117lbs, bedscale wt 107lbs visually more accurate. Pt with severe muscle/fat loss. Per ASPEN guidelines, pt meets criteria for severe protein-calorie malnutrition 2/2 muscle/fat loss, suspect meeting <75% EER. RD provided education on importance of optimal PO intake. Encouraged intake of small, frequent, nutrient dense meals. Pt willing to try protein shakes although discussed use of shakes in between meals, not as meal replacements. Pt currently on clear liquids, tolerating. Plan to advance diet today to regular. NKFA. No cultural, ethnic, Evangelical food preferences noted. Pt denies N/V/D/C. No pain noted. Please see full nutrition recommendations below. Will continue to follow per RD protocol.

## 2023-07-02 NOTE — PHYSICAL THERAPY INITIAL EVALUATION ADULT - GAIT DEVIATIONS NOTED, PT EVAL
Pt w/ appropriate ariana/step length, steady gait, no LOB/knee buckling noted; good negotiation through hallway obstacles without gait disturbances noted. Pt w/ appropriate ariana/step length, steady gait, no LOB/knee buckling noted; good negotiation through hallway obstacles without gait disturbances noted. Pt demo ability to perform standing heel raises and standing hip marches w/o LOB.

## 2023-07-02 NOTE — PHYSICAL THERAPY INITIAL EVALUATION ADULT - PERTINENT HX OF CURRENT PROBLEM, REHAB EVAL
77y.o F with PMH of MI (Sept 2022), depression, anorexia that present with altered mental status since today. Per EMS, she was found at home lethargic with two empty bottles of medication next to her, one unlabeled and one was mirtazapine. Per the patient's son, the patient's friend went to check on her today and found her in bed, awake but not responding to questions. She then was brought here to the ED by EMS. He reports that patient lives alone, and at baseline is able to ambulate without assistance . Since coming to the ED, he reports that he has been able to speak to her and she seemed confused. When trying to ambulate in the ED, she seemed to lose balance. He denies known SI or suicide attempts but does report that she has had worsening depression in the last few months requiring changes to her meds.   When reassessing patient, she is now alert and oriented, She notes she spoke to her son earlier today, and last thing she remembers is being in bed in the morning and then waking up to the EMS in her room. She has never had a similar episode before.

## 2023-07-02 NOTE — SWALLOW BEDSIDE ASSESSMENT ADULT - COMMENTS
Son reported gurgling and coughing with liquids in ED, however no difficulty with liquids today. Son also reported that pt's mental status is now almost back to baseline

## 2023-07-02 NOTE — DIETITIAN NUTRITION RISK NOTIFICATION - OTHER RISK FACTORS
Clinic Visit Summary    2019      SERG HICKEY Description:  Male :  1965   MRN: [id number] Provider:  Anand Angel M.D.    Primary Physician:  Inpatient,   Referring Physician:  Inpatient,      Insurance Information  MEDICARE REPLACEMENT HMO-NON CONTRACTED [id number] 2019  Patient Direct Billing 2019     Reason for Visit         Health History  Malignant neoplasm of rectum [ICD10] C20  Family history of malignant neoplasm of digestive organs [ICD10] Z80.0    Vitals (last recorded)  Heading     Test Name B/P P Weight (lb) (lb) Weight (kg) (kg) T (C)   2019 8:23 /78 84     36.6   2019 9:04 AM     197.75 89.7       Allergies (as reported by patient)  Allergy Type Name Reaction   Medication Allergy lurasidone Unknown   Medication Allergy talc Unknown      Current Medications (as reported by patient)  Drug Name Dose Strength Route   Actos [pioglitazone] 45 mg   Oral   atorvastatin 20 mg   Oral   Cogentin [benztropine] 1 mg   Oral   levothyroxine 225 mcg/100g   Oral   lorazepam     Oral   metformin 1,000 mg   Oral   Risperdal [risperidone] 2 mg   Oral   Tegretol XR [carbamazepine] 200 mg   Oral   trazodone 50 mg   Oral        Medications administered today  Drug Name Ordering Dose Actual Dose   Decadron PO (Dexamethasone PO) [Dexamethasone PO] 3 tab 3 tab   Aloxi (Palonosetron) [Palonosetron] 0.25 mg 0.25 mg     Medications prescribed today  Date Description Ordering Physician      Orders  Date Description Ordering Physician   2019 Infusion 4 Hours Anand Angel   2019 On Treatment Appointment Anand Angel   2019 PREGNANCY TEST URINE Anand Angel   2019 BETA HCG, QUANTITATIVE Anand Angel   2019 COMPREHENSIVE METABOLIC PANEL + CBC WITH AUTOMATED DIFFERENTIAL Anand Angel     Next Visit Information    Appointment Date Appointment Time Activity   1/15/2020 7:20 AM Lab Appt. – ARRIVE    1/15/2020 7:50 AM MD Prep 30   1/15/2020 8:20  AM On Treatment Appt 20   1/15/2020 8:40 AM Infusion 4 Hours          Reminders    · If you did not schedule your follow up appointment at the time of your visit, please call the department at [phone].  · Please provide a copy of this summary of care to your other providers.  · Please remember to bring the following items to your next appointment:  o Current medications or a list of your current medications.  o Insurance card and a photo ID.  § Please bring insurance and ID cards with you every time you come.  For safety and billing reasons, we must have copies of these documents in your medical record and we must verify your identity on a regular basis.  o Primary care physician referral, if applicable.  § If you are a member of an HMO or PPO that requires a referral before receiving specialty care, please obtain the referral from your primary care physician (PCP) prior to your appointment.  Please contact your insurance provider to learn if your coverage requires referrals to a specialist. If you do not have a valid referral, you may be asked to reschedule your visit.  Some referrals are valid for a certain number of weeks or visits; please keep track of how many weeks or visits have passed since obtaining the referral so that you will know when to ask your PCP for a new referral.  Obtaining referrals is your responsibility.  Your insurance provider will likely require you to pay the full cost of visits without a valid referral.        This summary document is based solely upon information made available to the [facility] staff as of 12/31/19.  Please notify us of additional information related to your care at [phone].     Underweight (BMI < 19)

## 2023-07-02 NOTE — BH CONSULTATION LIAISON ASSESSMENT NOTE - SUMMARY
Patient is a 77y.o F with PMH of MI (Sept 2022), past psychiatric history of depression, anorexia, one inpatient psych hospitalization, that present with altered mental status (found down by family) with concern for ingestion of excessive medication as EMS found empty bottles by the bedside.     Patient presenting after being found down next to empty bottles of meds, showing return to baseline mental status with period of metabolization, consistent with ingestion of excessive medication. Pt vehemently denies this and it will be fundamentally impossible to know exactly what happened. Regardless of this ingestion event, even prior to current presentation, the patient's family & outpatient psychiatrist, as well as patient herself, describe history of worsening depression/anxiety to the point of complete isolation, debilitating depression and anxiety that cause significant distress, hopelessness, and weight loss. Patient's symptoms were so severe that patient's outpatient psychiatrist had discussed the potential of ECT with patient's family, and had also stated that patient would likely benefit from inpatient level of care (outpatient psychiatrist had expressed this prior to current presentation). Patient would benefit from inpatient psychiatric hospitalization after medical clearance for acute stabilization, medication management, aftercare planning, diagnostic clarity. Family believes patient would potentially benefit from partial hospitalization after inpatient, given patient's level of impairment and because she lives alone.     Assessment and plan:    -Dispo: Pt currently amenable with voluntary inpatient psych hospitalization after medical clearance; were she to request discharge from medicine, she'd require reassessment but likely meets involuntary criteria due to inability to care for self per concerning family collateral     -1:1 on medical unit prior to transfer to psychiatry     -Collateral from patient's outpatient team (contact info included in the HPI) during business hours     -It is unknown what patient took excessive amounts of (and pt denies having taken excessive amounts of anything), so slowly reintroduce the patient's home meds of olanzapine, vybrid, mirtazapine, klonopine 0.25 mg po PRN qday; for now:   -initiate mirtazapine 7.5 mg po qhs; CL team will continue to follow the patient and guide further medication changes  -PRN: clonazepam 0.25 mg po qday prn (home med, max of one dose per 24 hour period)    -monitor for withdrawal in case pt took excessive benzo

## 2023-07-02 NOTE — DIETITIAN INITIAL EVALUATION ADULT - ADD RECOMMEND
1. Advance diet to regular  2. Add [employer] Standard 1.4 protein shakes 2x daily (each 455kcal/20gpro)  3. Encourage PO intake and monitor tolerance  4. RD to remain available prn

## 2023-07-02 NOTE — DIETITIAN INITIAL EVALUATION ADULT - NSICDXPASTMEDICALHX_GEN_ALL_CORE_FT
PAST MEDICAL HISTORY:  Anorexia     CAD (coronary artery disease)     HLD (hyperlipidemia)     HTN (hypertension)     Major depression     Myocardial infarct

## 2023-07-02 NOTE — BH CONSULTATION LIAISON ASSESSMENT NOTE - NSBHMSEKNOW_PSY_A_CORE
PROCEDURES:  Repair, hernia, inguinal, open, using mesh, adult 12-Jan-2022 08:53:20  Chao Qiuck  
Normal

## 2023-07-02 NOTE — DIETITIAN INITIAL EVALUATION ADULT - PROBLEM SELECTOR PLAN 1
Pt presents with AMS since this morning, found by EMS lethargic with 2 empty bottles of medications, one unlabeled, the other mirtazepine. Infectious vs thyroid vs metabolic vs TIA vs medication  induced  -WBC wnl, afebrile, CXR shows no infiltrate , UA neg.   -Electrolytes wnl, U tox-  -Hx of CAD, however no focal deficit, low suspicion for TIA. CT head negative.    Plan:   -Enhanced observation   -F/u Blood cultures   -Phosphorus level  -f/u CMP  - F/u TSH  - holding home olanzapine, mirtazipine, vilazodone  -Psych consult  -PT consult

## 2023-07-02 NOTE — BH CONSULTATION LIAISON ASSESSMENT NOTE - CURRENT MEDICATION
MEDICATIONS  (STANDING):  acetaminophen     Tablet .. 650 milliGRAM(s) Oral once  atorvastatin 10 milliGRAM(s) Oral at bedtime  carvedilol 3.125 milliGRAM(s) Oral every 12 hours  clopidogrel Tablet 75 milliGRAM(s) Oral daily  enoxaparin Injectable 30 milliGRAM(s) SubCutaneous every 24 hours  levothyroxine 25 MICROGram(s) Oral daily  polyethylene glycol 3350 17 Gram(s) Oral every 12 hours  senna 2 Tablet(s) Oral at bedtime    MEDICATIONS  (PRN):  acetaminophen     Tablet .. 650 milliGRAM(s) Oral every 6 hours PRN Temp greater or equal to 38C (100.4F), Mild Pain (1 - 3)  bisacodyl 5 milliGRAM(s) Oral every 12 hours PRN Constipation  melatonin 3 milliGRAM(s) Oral at bedtime PRN Insomnia

## 2023-07-02 NOTE — BH CONSULTATION LIAISON ASSESSMENT NOTE - HPI (INCLUDE ILLNESS QUALITY, SEVERITY, DURATION, TIMING, CONTEXT, MODIFYING FACTORS, ASSOCIATED SIGNS AND SYMPTOMS)
Patient is a 77y.o F with PMH of MI (Sept 2022), depression, anorexia that present with altered mental status (found down by family) with concern for ingestion of excessive medication as EMS found empty bottles by the bedside.     Floor course: patient presented in a confused and delirious state and has become linear & returned to baseline with period of metabolization. Primary team envisions medically clearing the patient in the near future.    Per evaluation of patient: vehemently denies that she took excessive amounts of medication. States that empty medication bottles were found at the bedside because she was consolidating multiple bottles of medication into one. Is unable to provide alternate explanation as to why she was found unarousable. Reports severe depression on a daily basis. Reports anhedonia & agoraphobia, feeling afraid to leave the house. Denies SI/HI/AVH.     Per collateral from the patient's son & son's wife (pt's daughter-in-law): Reports that patient has been experiencing severe & debilitating depression and anxiety starting around the time of her MI one year ago, getting worse & more impairing starting December of 2022 (pt has longstanding history prior to that though, with inpatient psych hospitalization >5 years ago & history of SSRI tx years ago). State that patient has become increasingly isolative and barely leaves the home now. Give example of patient being unable to attend beloved granddaughter's graduation ceremony due to depression/isolative behavior, even though the ceremony was closeby. Report that patient frequently makes statements expressing how depressed and hopeless she feels. Also report that patient exhibits generalized and debilitating levels of anxiety about a wide array of triggers (such as phone calls). They are unsure whether what occurred last night was a suicide attempt; state that it's possible that patient may have forgotten that she had taken her meds or been confused, and then taken more meds. Deny any known suicide attempts in the past. Deny having heard patient make any suicidal statements in the past. Report that part of the patient's pattern of anxiety is frequently contacting her psychiatrist and endorsing distress, which has resulted in frequent medication changes. They state that the patient's outpatient psychiatrist has also stated concern that patient require inpatient care (even prior to the current ingestion event) and has also brought up the potential for ECT given the severity of the patient's symptoms. They also express concern about the patient's low weight and state that she barely eats; are unable to provide further detail regarding the patient's food intake as patient lives alone.     Patient's psychiatrist is Dr. Briones [phone]  Patient's therapist (as of two weeks ago) is Gabriel [phone]    Patient's current outpatient medications are:   olanzapine of unknown dosage qhs   mirtazapine 60 mg po qday (upped from 30 mg recently by outpatient psychiatrist)  clonazepam 0.25 mg po qday PRN (pt reports she takes maximum of 0.25 mg on 4/7 days)  Vilazodone of unknown dose  Patient is a 77y.o F with PMH of MI (Sept 2022), past psychiatric history of depression, anorexia, one inpatient psych hospitalization, that present with altered mental status (found down by family) with concern for ingestion of excessive medication as EMS found empty bottles by the bedside.     Floor course: patient presented in a confused and delirious state and has become linear & returned to baseline with period of metabolization. Primary team envisions medically clearing the patient in the near future.    Per evaluation of patient: vehemently denies that she took excessive amounts of medication. States that empty medication bottles were found at the bedside because she was consolidating multiple bottles of medication into one. Is unable to provide alternate explanation as to why she was found unarousable. Reports severe depression on a daily basis. Reports anhedonia & agoraphobia, feeling afraid to leave the house. Denies SI/HI/AVH.     Per collateral from the patient's son & son's wife (pt's daughter-in-law): Reports that patient has been experiencing severe & debilitating depression and anxiety starting around the time of her MI one year ago, getting worse & more impairing starting December of 2022 (pt has longstanding history prior to that though, with inpatient psych hospitalization >5 years ago & history of SSRI tx years ago). State that patient has become increasingly isolative and barely leaves the home now. Give example of patient being unable to attend beloved granddaughter's graduation ceremony due to depression/isolative behavior, even though the ceremony was closeby. Report that patient frequently makes statements expressing how depressed and hopeless she feels. Also report that patient exhibits generalized and debilitating levels of anxiety about a wide array of triggers (such as phone calls). They are unsure whether what occurred last night was a suicide attempt; state that it's possible that patient may have forgotten that she had taken her meds or been confused, and then taken more meds. Deny any known suicide attempts in the past. Deny having heard patient make any suicidal statements in the past. Report that part of the patient's pattern of anxiety is frequently contacting her psychiatrist and endorsing distress, which has resulted in frequent medication changes. They state that the patient's outpatient psychiatrist has also stated concern that patient require inpatient care (even prior to the current ingestion event) and has also brought up the potential for ECT given the severity of the patient's symptoms. They also express concern about the patient's low weight and state that she barely eats; are unable to provide further detail regarding the patient's food intake as patient lives alone.     Patient's psychiatrist is Dr. Briones [phone]  Patient's therapist (as of two weeks ago) is Gabriel [phone]    Patient's current outpatient medications are:   olanzapine of unknown dosage qhs   mirtazapine 60 mg po qday (upped from 30 mg recently by outpatient psychiatrist)  clonazepam 0.25 mg po qday PRN (pt reports she takes maximum of 0.25 mg on 4/7 days)  Vilazodone of unknown dose

## 2023-07-02 NOTE — PROGRESS NOTE ADULT - PROBLEM SELECTOR PLAN 1
Pt presented with AMS, found by EMS lethargic with 2 empty bottles of medications, one unlabeled, the other mirtazepine. Infectious vs thyroid vs metabolic vs TIA vs medication induced  -WBC wnl, afebrile, CXR shows no infiltrate , UA neg.   -Electrolytes wnl, U tox-neg    Plan:   -Enhanced observation   -Phosphorus level  -holding home olanzapine, mirtazipine, vilazodone  -Psych consult  -PT consult.

## 2023-07-02 NOTE — PROGRESS NOTE ADULT - PROBLEM SELECTOR PLAN 5
·  Problem: Depression, major.   ·  Plan: HX of depression.     Plan   -hold home Mirtazapine, Olanzepine, Viibryd, Klonopin.  -f/u Psych consult

## 2023-07-02 NOTE — BH CONSULTATION LIAISON ASSESSMENT NOTE - OTHER PAST PSYCHIATRIC HISTORY (INCLUDE DETAILS REGARDING ONSET, COURSE OF ILLNESS, INPATIENT/OUTPATIENT TREATMENT)
reports history of one inpatient psych hospitalization around 7 years ago in context of depressive symptoms; no known history of suicide attempts; history of SSRI tx

## 2023-07-02 NOTE — DIETITIAN INITIAL EVALUATION ADULT - PROBLEM SELECTOR PLAN 3
BP ranges from 188/104 to 88/53 measured at bedside. s/p carvedilol, Entresto and 1L NS in the ED. Pt mentating well, asymptomatic.    Plan:   -NS 250cc bolus and reassess  - Hold off on home antihypertensives given systolics in 80-90s

## 2023-07-02 NOTE — DIETITIAN INITIAL EVALUATION ADULT - PERTINENT MEDS FT
MEDICATIONS  (STANDING):  atorvastatin 10 milliGRAM(s) Oral at bedtime  clopidogrel Tablet 75 milliGRAM(s) Oral daily  enoxaparin Injectable 30 milliGRAM(s) SubCutaneous every 24 hours  levothyroxine 25 MICROGram(s) Oral daily    MEDICATIONS  (PRN):  acetaminophen     Tablet .. 650 milliGRAM(s) Oral every 6 hours PRN Temp greater or equal to 38C (100.4F), Mild Pain (1 - 3)  melatonin 3 milliGRAM(s) Oral at bedtime PRN Insomnia

## 2023-07-02 NOTE — SWALLOW BEDSIDE ASSESSMENT ADULT - SLP PERTINENT HISTORY OF CURRENT PROBLEM
77y.o F with PMH of MI (Sept 2022), depression, anorexia who present with AMS after being found lethargic at home by EMS with 2 empty bottles of medications next to her. She was found to have Utox negative, WBC wnm, afebrile, negative CXR, negative CT head.

## 2023-07-02 NOTE — PROGRESS NOTE ADULT - PROBLEM SELECTOR PLAN 8
E: Replete as needed  N; Clear liquid diet pending speech and swallow  Dvt ppx: lovenox  Code status: FULL code  Dispo: [facility].

## 2023-07-02 NOTE — DIETITIAN INITIAL EVALUATION ADULT - PERTINENT LABORATORY DATA
07-02    143  |  108  |  20  ----------------------------<  116<H>  3.9   |  26  |  0.67    Ca    8.6      02 Jul 2023 07:14  Phos  2.6     07-02  Mg     2.0     07-02    TPro  6.0  /  Alb  3.4  /  TBili  0.5  /  DBili  x   /  AST  16  /  ALT  10  /  AlkPhos  45  07-02  POCT Blood Glucose.: 114 mg/dL (07-01-23 @ 15:50)

## 2023-07-02 NOTE — BH CONSULTATION LIAISON ASSESSMENT NOTE - ADDITIONAL DETAILS ALL
Pt vehemently denying that she took excessive medication or has ever experienced SI, but may be minimizing

## 2023-07-03 ENCOUNTER — INPATIENT (INPATIENT)
Facility: HOSPITAL | Age: 78
LOS: 13 days | Discharge: HOME | DRG: 885 | End: 2023-07-17
Attending: PSYCHIATRY & NEUROLOGY | Admitting: PSYCHIATRY & NEUROLOGY
Payer: MEDICARE

## 2023-07-03 ENCOUNTER — TRANSCRIPTION ENCOUNTER (OUTPATIENT)
Age: 78
End: 2023-07-03

## 2023-07-03 VITALS — HEART RATE: 87 BPM | DIASTOLIC BLOOD PRESSURE: 69 MMHG | SYSTOLIC BLOOD PRESSURE: 133 MMHG

## 2023-07-03 VITALS
DIASTOLIC BLOOD PRESSURE: 72 MMHG | HEIGHT: 66 IN | HEART RATE: 92 BPM | WEIGHT: 95.02 LBS | RESPIRATION RATE: 19 BRPM | TEMPERATURE: 98 F | OXYGEN SATURATION: 96 % | SYSTOLIC BLOOD PRESSURE: 148 MMHG

## 2023-07-03 PROBLEM — F32.9 MAJOR DEPRESSIVE DISORDER, SINGLE EPISODE, UNSPECIFIED: Chronic | Status: ACTIVE | Noted: 2023-07-01

## 2023-07-03 PROBLEM — R63.0 ANOREXIA: Chronic | Status: ACTIVE | Noted: 2023-07-01

## 2023-07-03 LAB
ALBUMIN SERPL ELPH-MCNC: 3.5 G/DL — SIGNIFICANT CHANGE UP (ref 3.3–5)
ALP SERPL-CCNC: 50 U/L — SIGNIFICANT CHANGE UP (ref 40–120)
ALT FLD-CCNC: 11 U/L — SIGNIFICANT CHANGE UP (ref 10–45)
ANION GAP SERPL CALC-SCNC: 9 MMOL/L — SIGNIFICANT CHANGE UP (ref 5–17)
AST SERPL-CCNC: 17 U/L — SIGNIFICANT CHANGE UP (ref 10–40)
BASOPHILS # BLD AUTO: 0.02 K/UL — SIGNIFICANT CHANGE UP (ref 0–0.2)
BASOPHILS NFR BLD AUTO: 0.2 % — SIGNIFICANT CHANGE UP (ref 0–2)
BILIRUB SERPL-MCNC: 0.4 MG/DL — SIGNIFICANT CHANGE UP (ref 0.2–1.2)
BLD GP AB SCN SERPL QL: NEGATIVE — SIGNIFICANT CHANGE UP
BUN SERPL-MCNC: 16 MG/DL — SIGNIFICANT CHANGE UP (ref 7–23)
CALCIUM SERPL-MCNC: 8.8 MG/DL — SIGNIFICANT CHANGE UP (ref 8.4–10.5)
CHLORIDE SERPL-SCNC: 105 MMOL/L — SIGNIFICANT CHANGE UP (ref 96–108)
CO2 SERPL-SCNC: 27 MMOL/L — SIGNIFICANT CHANGE UP (ref 22–31)
CREAT SERPL-MCNC: 0.76 MG/DL — SIGNIFICANT CHANGE UP (ref 0.5–1.3)
EGFR: 82 ML/MIN/1.73M2 — SIGNIFICANT CHANGE UP
EOSINOPHIL # BLD AUTO: 0.1 K/UL — SIGNIFICANT CHANGE UP (ref 0–0.5)
EOSINOPHIL NFR BLD AUTO: 1.2 % — SIGNIFICANT CHANGE UP (ref 0–6)
GLUCOSE SERPL-MCNC: 103 MG/DL — HIGH (ref 70–99)
HCT VFR BLD CALC: 32.3 % — LOW (ref 34.5–45)
HGB BLD-MCNC: 10.2 G/DL — LOW (ref 11.5–15.5)
IMM GRANULOCYTES NFR BLD AUTO: 0.5 % — SIGNIFICANT CHANGE UP (ref 0–0.9)
LYMPHOCYTES # BLD AUTO: 0.34 K/UL — LOW (ref 1–3.3)
LYMPHOCYTES # BLD AUTO: 4.2 % — LOW (ref 13–44)
MAGNESIUM SERPL-MCNC: 2.1 MG/DL — SIGNIFICANT CHANGE UP (ref 1.6–2.6)
MCHC RBC-ENTMCNC: 31.6 GM/DL — LOW (ref 32–36)
MCHC RBC-ENTMCNC: 32 PG — SIGNIFICANT CHANGE UP (ref 27–34)
MCV RBC AUTO: 101.3 FL — HIGH (ref 80–100)
MONOCYTES # BLD AUTO: 0.74 K/UL — SIGNIFICANT CHANGE UP (ref 0–0.9)
MONOCYTES NFR BLD AUTO: 9.1 % — SIGNIFICANT CHANGE UP (ref 2–14)
NEUTROPHILS # BLD AUTO: 6.87 K/UL — SIGNIFICANT CHANGE UP (ref 1.8–7.4)
NEUTROPHILS NFR BLD AUTO: 84.8 % — HIGH (ref 43–77)
NRBC # BLD: 0 /100 WBCS — SIGNIFICANT CHANGE UP (ref 0–0)
PHOSPHATE SERPL-MCNC: 1.9 MG/DL — LOW (ref 2.5–4.5)
PLATELET # BLD AUTO: 169 K/UL — SIGNIFICANT CHANGE UP (ref 150–400)
POTASSIUM SERPL-MCNC: 3.6 MMOL/L — SIGNIFICANT CHANGE UP (ref 3.5–5.3)
POTASSIUM SERPL-SCNC: 3.6 MMOL/L — SIGNIFICANT CHANGE UP (ref 3.5–5.3)
PROT SERPL-MCNC: 6.4 G/DL — SIGNIFICANT CHANGE UP (ref 6–8.3)
RBC # BLD: 3.19 M/UL — LOW (ref 3.8–5.2)
RBC # FLD: 12.9 % — SIGNIFICANT CHANGE UP (ref 10.3–14.5)
RH IG SCN BLD-IMP: POSITIVE — SIGNIFICANT CHANGE UP
SODIUM SERPL-SCNC: 141 MMOL/L — SIGNIFICANT CHANGE UP (ref 135–145)
WBC # BLD: 8.11 K/UL — SIGNIFICANT CHANGE UP (ref 3.8–10.5)
WBC # FLD AUTO: 8.11 K/UL — SIGNIFICANT CHANGE UP (ref 3.8–10.5)

## 2023-07-03 PROCEDURE — 99239 HOSP IP/OBS DSCHRG MGMT >30: CPT

## 2023-07-03 PROCEDURE — 99233 SBSQ HOSP IP/OBS HIGH 50: CPT

## 2023-07-03 RX ORDER — POLYETHYLENE GLYCOL 3350 17 G/17G
17 POWDER, FOR SOLUTION ORAL
Qty: 0 | Refills: 0 | DISCHARGE
Start: 2023-07-03

## 2023-07-03 RX ORDER — VENLAFAXINE HCL 75 MG
1 CAPSULE, EXT RELEASE 24 HR ORAL
Refills: 0 | DISCHARGE

## 2023-07-03 RX ORDER — LANOLIN ALCOHOL/MO/W.PET/CERES
3 CREAM (GRAM) TOPICAL AT BEDTIME
Refills: 0 | Status: DISCONTINUED | OUTPATIENT
Start: 2023-07-03 | End: 2023-07-17

## 2023-07-03 RX ORDER — CARVEDILOL PHOSPHATE 80 MG/1
3.12 CAPSULE, EXTENDED RELEASE ORAL EVERY 12 HOURS
Refills: 0 | Status: DISCONTINUED | OUTPATIENT
Start: 2023-07-03 | End: 2023-07-17

## 2023-07-03 RX ORDER — POTASSIUM CHLORIDE 20 MEQ
40 PACKET (EA) ORAL ONCE
Refills: 0 | Status: COMPLETED | OUTPATIENT
Start: 2023-07-03 | End: 2023-07-03

## 2023-07-03 RX ORDER — POTASSIUM PHOSPHATE, MONOBASIC POTASSIUM PHOSPHATE, DIBASIC 236; 224 MG/ML; MG/ML
15 INJECTION, SOLUTION INTRAVENOUS ONCE
Refills: 0 | Status: COMPLETED | OUTPATIENT
Start: 2023-07-03 | End: 2023-07-03

## 2023-07-03 RX ORDER — LEVOTHYROXINE SODIUM 125 MCG
25 TABLET ORAL DAILY
Refills: 0 | Status: DISCONTINUED | OUTPATIENT
Start: 2023-07-03 | End: 2023-07-17

## 2023-07-03 RX ORDER — ATORVASTATIN CALCIUM 80 MG/1
10 TABLET, FILM COATED ORAL AT BEDTIME
Refills: 0 | Status: DISCONTINUED | OUTPATIENT
Start: 2023-07-03 | End: 2023-07-17

## 2023-07-03 RX ORDER — VILAZODONE HYDROCHLORIDE 20 MG/1
1 TABLET, FILM COATED ORAL
Refills: 0 | DISCHARGE

## 2023-07-03 RX ORDER — CLONAZEPAM 1 MG
1 TABLET ORAL
Refills: 0 | DISCHARGE

## 2023-07-03 RX ORDER — CHOLECALCIFEROL (VITAMIN D3) 125 MCG
2000 CAPSULE ORAL DAILY
Refills: 0 | Status: DISCONTINUED | OUTPATIENT
Start: 2023-07-03 | End: 2023-07-17

## 2023-07-03 RX ORDER — SACUBITRIL AND VALSARTAN 24; 26 MG/1; MG/1
1 TABLET, FILM COATED ORAL
Refills: 0 | Status: DISCONTINUED | OUTPATIENT
Start: 2023-07-03 | End: 2023-07-17

## 2023-07-03 RX ORDER — SENNA PLUS 8.6 MG/1
2 TABLET ORAL
Qty: 0 | Refills: 0 | DISCHARGE
Start: 2023-07-03

## 2023-07-03 RX ORDER — CLONAZEPAM 1 MG
0 TABLET ORAL
Qty: 0 | Refills: 0 | DISCHARGE

## 2023-07-03 RX ORDER — HALOPERIDOL DECANOATE 100 MG/ML
2 INJECTION INTRAMUSCULAR EVERY 6 HOURS
Refills: 0 | Status: DISCONTINUED | OUTPATIENT
Start: 2023-07-03 | End: 2023-07-06

## 2023-07-03 RX ORDER — OLANZAPINE 15 MG/1
1 TABLET, FILM COATED ORAL
Refills: 0 | DISCHARGE

## 2023-07-03 RX ORDER — CLOPIDOGREL BISULFATE 75 MG/1
75 TABLET, FILM COATED ORAL DAILY
Refills: 0 | Status: DISCONTINUED | OUTPATIENT
Start: 2023-07-03 | End: 2023-07-17

## 2023-07-03 RX ORDER — MIRTAZAPINE 45 MG/1
7.5 TABLET, ORALLY DISINTEGRATING ORAL AT BEDTIME
Refills: 0 | Status: DISCONTINUED | OUTPATIENT
Start: 2023-07-03 | End: 2023-07-11

## 2023-07-03 RX ADMIN — Medication 650 MILLIGRAM(S): at 03:50

## 2023-07-03 RX ADMIN — CARVEDILOL PHOSPHATE 3.12 MILLIGRAM(S): 80 CAPSULE, EXTENDED RELEASE ORAL at 06:46

## 2023-07-03 RX ADMIN — SACUBITRIL AND VALSARTAN 1 TABLET(S): 24; 26 TABLET, FILM COATED ORAL at 18:30

## 2023-07-03 RX ADMIN — Medication 650 MILLIGRAM(S): at 03:09

## 2023-07-03 RX ADMIN — POLYETHYLENE GLYCOL 3350 17 GRAM(S): 17 POWDER, FOR SOLUTION ORAL at 06:46

## 2023-07-03 RX ADMIN — Medication 25 MICROGRAM(S): at 06:46

## 2023-07-03 RX ADMIN — MIRTAZAPINE 7.5 MILLIGRAM(S): 45 TABLET, ORALLY DISINTEGRATING ORAL at 21:33

## 2023-07-03 RX ADMIN — CLOPIDOGREL BISULFATE 75 MILLIGRAM(S): 75 TABLET, FILM COATED ORAL at 12:16

## 2023-07-03 RX ADMIN — CARVEDILOL PHOSPHATE 3.12 MILLIGRAM(S): 80 CAPSULE, EXTENDED RELEASE ORAL at 18:30

## 2023-07-03 RX ADMIN — ATORVASTATIN CALCIUM 10 MILLIGRAM(S): 80 TABLET, FILM COATED ORAL at 21:34

## 2023-07-03 RX ADMIN — POTASSIUM PHOSPHATE, MONOBASIC POTASSIUM PHOSPHATE, DIBASIC 62.5 MILLIMOLE(S): 236; 224 INJECTION, SOLUTION INTRAVENOUS at 11:41

## 2023-07-03 RX ADMIN — Medication 40 MILLIEQUIVALENT(S): at 09:51

## 2023-07-03 RX ADMIN — Medication 3 MILLIGRAM(S): at 21:33

## 2023-07-03 NOTE — BH CONSULTATION LIAISON PROGRESS NOTE - NSBHATTESTCOMMENTATTENDFT_PSY_A_CORE
Destinee has a small abnormality on the surface of the liver that is too small to characterize.  She discussed additional antihormone therapy with Dr Holliday who was not enthusiastic about it.  We will go with observation.  She will have a pelvic exam at Dr Holliday' office in 3 months.  I will see her in 6 months with a CT.   Patient is a 76 y/o woman with PPH of MDD, agoraphobia, ?anorexia (pt denies prior diagnosis), past psychiatric admissions for depression, no history of self harm and PMH of MI (Sept 2022) who presented with altered mental status after being found lethargic at home by EMS with empty bottles of medications near to hear, with the initial suspicion of OD. On the initial assessment and later re-assessment pt denied having a SA or any history of self harm. She admitted worsening depression of > 1 year and recent updates in her treatment (currently prescribed mirtazapine 60mg, Viibryd, olanzapine 20mg qhs, clonazepam 0.5-1mg prn). Pt reports feeling overwhelmed with taking so many psychotropics which could have led to unintentionally mix her medications. Pt endorses low mood, anhedonia, worsening agoraphobia, poor appetite with weight loss (denies intentional weight loss), hopelessness.  Collateral from family members confirm that pt hasn't expressed SI or had any suicidal gestures. Family is concerned about pt's worsening depression (with significant weight loss for after her MI dg) and pt's current outpatient mental health follow up (pt sees a [facility] licensed psychiatrist who has been updating her medications). Pt is currently requesting a psychiatric admission to be titrated on different psychotropics and referred for outpatient followup after discharge. dx MDD recurrent, severe, r/o agoraphobia. Plan:- admit to inpatient psychiatry, voluntary status; -continue mirtazaline 7.5mg po qhs; -defer to the inpatient psychiatric team starting /restarting other psychotropics; -prns for agitation with haldol 2mg po q6h prn; -CL to follow until transfer     For new consults/questions/concerns: Monday to Friday (8am – 5pm): [phone] or internally ext# [phone]  For Afterhours (5pm-11pm), including weekends (Sat/Sun) please call the [employer] line: [phone]  For emergencies during night hours (11pm-8am) please call the Attending on call (for direct contact information check [email or web address] Patient is a 78 y/o woman with PPH of MDD, agoraphobia, ?anorexia (pt denies prior diagnosis), past psychiatric admissions for depression, no history of self harm and PMH of MI (Sept 2022) who presented with altered mental status after being found lethargic at home by EMS with empty bottles of medications near to hear, with the initial suspicion of OD. On the initial assessment and later re-assessment pt denied having a SA or any history of self harm. She admitted worsening depression of > 1 year and recent updates in her treatment (currently prescribed mirtazapine 60mg, Viibryd, olanzapine 20mg qhs, clonazepam 0.5-1mg prn). Pt reports feeling overwhelmed with taking so many psychotropics which could have led to unintentionally mix her medications. Pt endorses low mood, anhedonia, worsening agoraphobia, poor appetite with weight loss (denies intentional weight loss), hopelessness.  Collateral from family members confirm that pt hasn't expressed SI or had any suicidal gestures. Family is concerned about pt's worsening depression (with significant weight loss for after her MI dg) and pt's current outpatient mental health follow up (pt sees a [facility] licensed psychiatrist who has been updating her medications). Pt is currently requesting a psychiatric admission to be titrated on different psychotropics and referred for outpatient followup after discharge. Pt's outpt psychiatrist is also advocating for a psychiatric admission. dx MDD recurrent, severe, r/o agoraphobia r/o anorexia, r/o cluster B PD (per collateral from outpatient psychiatrist). Plan:- admit to inpatient psychiatry, voluntary status; -continue mirtazaline 7.5mg po qhs; -defer to the inpatient psychiatric team starting /restarting other psychotropics; -prns for agitation with haldol 2mg po q6h prn; -CL to follow until transfer     For new consults/questions/concerns: Monday to Friday (8am – 5pm): [phone] or internally ext# [phone]  For Afterhours (5pm-11pm), including weekends (Sat/Sun) please call the [employer] line: [phone]  For emergencies during night hours (11pm-8am) please call the Attending on call (for direct contact information check [email or web address] Patient is a 76 y/o woman with PPH of MDD, agoraphobia, ?anorexia (pt denies prior diagnosis), past psychiatric admissions for depression, no history of self harm and PMH of MI (Sept 2022) who presented with altered mental status after being found lethargic at home by EMS with empty bottles of medications near to hear, with the initial suspicion of OD. On the initial assessment and later re-assessment pt denied having a SA or any history of self harm. She admitted worsening depression of > 1 year and recent updates in her treatment (currently prescribed mirtazapine 60mg, Viibryd, olanzapine 20mg qhs, clonazepam 0.5-1mg prn). Pt reports feeling overwhelmed with taking so many psychotropics which could have led to unintentionally mix her medications. Pt endorses low mood, anhedonia, worsening agoraphobia, poor appetite with weight loss (denies intentional weight loss), hopelessness.  Collateral from family members confirm that pt hasn't expressed SI or had any suicidal gestures. Family is concerned about pt's worsening depression (with significant weight loss for after her MI dg) and pt's current outpatient mental health follow up (pt sees a [facility] licensed psychiatrist who has been updating her medications). Pt is currently requesting a psychiatric admission to be titrated on different psychotropics and referred for outpatient followup after discharge. Pt's outpt psychiatrist is also advocating for a psychiatric admission. dx resolved delirium, MDD recurrent, severe, r/o agoraphobia r/o anorexia, r/o cluster B PD (per collateral from outpatient psychiatrist) r/o cognitive d/o .Assessment: Pt is a 76 y/o woman with PMH of MI and hx of depression, agoraphobia, ?anorexia, ?cluster B PD, presenting to [facility] with AMS s/p ingestion of multiple psychotropics, with low suspicion at this point of intentional OD but concern of polypharmacy, r/o delirium (?anticholinergic). Pt and family members report worsening depression of several months with poor response to outpatient treatment. Plan:- admit to inpatient psychiatry, voluntary status; -continue mirtazapine 7.5mg po qhs; -defer to the inpatient psychiatric team starting /restarting other psychotropics; -prns for agitation with haldol 2mg po q6h prn;- can d/c 1:1 observation;  -CL to follow until transfer     For new consults/questions/concerns: Monday to Friday (8am – 5pm): [phone] or internally ext# [phone]  For Afterhours (5pm-11pm), including weekends (Sat/Sun) please call the [employer] line: [phone]  For emergencies during night hours (11pm-8am) please call the Attending on call (for direct contact information check [email or web address] Patient is a 76 y/o woman with PPH of MDD, agoraphobia, ?anorexia (pt denies prior diagnosis), past psychiatric admissions for depression, no history of self harm and PMH of MI (Sept 2022) who presented with altered mental status after being found lethargic at home by EMS with empty bottles of medications near to hear, with the initial suspicion of OD. On the initial assessment and later re-assessment pt denied having a SA or any history of self harm. She admitted worsening depression of > 1 year and recent updates in her treatment (currently prescribed mirtazapine 60mg, Viibryd, olanzapine 20mg qhs, clonazepam 0.5-1mg prn). Pt reports feeling overwhelmed with taking so many psychotropics which could have led to unintentionally mix her medications. Pt endorses low mood, anhedonia, worsening agoraphobia, poor appetite with weight loss (denies intentional weight loss), hopelessness.  Collateral from family members confirm that pt hasn't expressed SI or had any suicidal gestures. Family is concerned about pt's worsening depression (with significant weight loss for after her MI dg) and pt's current outpatient mental health follow up (pt sees a [facility] licensed psychiatrist who has been updating her medications). Pt is currently requesting a psychiatric admission to be titrated on different psychotropics and referred for outpatient followup after discharge. Pt's outpt psychiatrist is also advocating for a psychiatric admission. dx resolved delirium, MDD recurrent, severe, r/o agoraphobia r/o anorexia, r/o cluster B PD (per collateral from outpatient psychiatrist) r/o cognitive d/o .Assessment: Pt is a 76 y/o woman with PMH of MI and hx of depression, agoraphobia, ?anorexia, ?cluster B PD, presenting to [facility] with AMS s/p ingestion of multiple psychotropics, with low suspicion at this point of intentional OD but concern of polypharmacy, r/o delirium (?anticholinergic). Pt and family members report worsening depression of several months with poor response to outpatient treatment. Plan:- admit to inpatient psychiatry, voluntary status; -continue mirtazapine 7.5mg po qhs; -hold klonopin- pt not a candidate for benzodiazepines, CIWA monitoring for withdrawal; -hold viibryd, olanzapine due to concern of polypharmacy and unclear benefir; defer to the inpatient psychiatric team starting /restarting other psychotropics; -prns for agitation with haldol 2mg po q6h prn;- can d/c 1:1 observation;  -CL to follow until transfer     For new consults/questions/concerns: Monday to Friday (8am – 5pm): [phone] or internally ext# [phone]  For Afterhours (5pm-11pm), including weekends (Sat/Sun) please call the [facility] line: [phone]  For emergencies during night hours (11pm-8am) please call the Attending on call (for direct contact information check [email or web address] Patient is a 76 y/o woman with PPH of MDD, agoraphobia, ?anorexia (pt denies prior diagnosis), past psychiatric admissions for depression, no history of self harm and PMH of MI (Sept 2022) who presented with altered mental status after being found lethargic at home by EMS with empty bottles of medications near to hear, with the initial suspicion of OD. On the initial assessment and later re-assessment pt denied having a SA or any history of self harm. She admitted worsening depression of > 1 year and recent updates in her treatment (currently prescribed mirtazapine 60mg, Viibryd, olanzapine 20mg qhs, clonazepam 0.5-1mg prn). Pt reports feeling overwhelmed with taking so many psychotropics which could have led to unintentionally mix her medications. Pt endorses low mood, anhedonia, worsening agoraphobia, poor appetite with weight loss (denies intentional weight loss), hopelessness.  Collateral from family members confirm that pt hasn't expressed SI or had any suicidal gestures. Family is concerned about pt's worsening depression (with significant weight loss for after her MI dg) and pt's current outpatient mental health follow up (pt sees a [facility] licensed psychiatrist who has been updating her medications). Pt is currently requesting a psychiatric admission to be titrated on different psychotropics and referred for outpatient followup after discharge. Pt's outpt psychiatrist is also advocating for a psychiatric admission. dx resolved delirium, MDD recurrent, severe, r/o agoraphobia r/o anorexia, r/o cluster B PD (per collateral from outpatient psychiatrist) r/o cognitive d/o .Assessment: Pt is a 76 y/o woman with PMH of MI and hx of depression, agoraphobia, ?anorexia, ?cluster B PD, presenting to [facility] with AMS s/p ingestion of multiple psychotropics, with low suspicion at this point of intentional OD but concern of polypharmacy, r/o delirium (?anticholinergic). Pt and family members report worsening depression of several months with poor response to outpatient treatment. Plan:- admit to inpatient psychiatry, voluntary status; -continue mirtazapine 7.5mg po qhs; -hold klonopin- pt not a candidate for benzodiazepines, CIWA monitoring for withdrawal; -if there is concern of benzo withdrawal, can restart klonopin at 0.5mg q12h prn, with the goal to taper off; -hold viibryd, olanzapine due to concern of polypharmacy and unclear benefit; defer to the inpatient psychiatric team starting /restarting other psychotropics; -prns for agitation with haldol 2mg po q6h prn;- can d/c 1:1 observation;  -CL to follow until transfer

## 2023-07-03 NOTE — DISCHARGE NOTE PROVIDER - NSDCCPCAREPLAN_GEN_ALL_CORE_FT
PRINCIPAL DISCHARGE DIAGNOSIS  Diagnosis: Toxic metabolic encephalopathy  Assessment and Plan of Treatment: You were admitted to the hospital due to toxi metabolic encephalopathy. You were found by emergency services with 2 empty bottles of medications, one of which was unlabled, the other mirtazepine (an anti-depressant). Delirium is a brain disorder that causes people to be confused. There are other terms doctors might use when they talk about delirium, too. They sometimes use the term "altered mental status" for when a person's brain isn't working normally. The word "confusion" means a person isn't thinking clearly. Different kinds of conditions or situations can cause delirium. The most common reasons people have delirium include having a medical condition (such as pneumonia or urinary tract infection) or as a side effect of medicines, illegal substance use, or alcohol withdrawal. Based on your presentation and your history, the most likley culprit is from taking too many of your medications. It is EXTREMELY important that you take your medications as instructed.        PRINCIPAL DISCHARGE DIAGNOSIS  Diagnosis: Toxic metabolic encephalopathy  Assessment and Plan of Treatment: You were admitted to the hospital due to toxic metabolic encephalopathy (TME). You were found by emergency services with 2 empty bottles of medications, one of which was unlabled, the other mirtazepine (an anti-depressant). Delirium is a brain disorder that causes people to be confused. There are other terms doctors might use when they talk about delirium, too. They sometimes use the term "altered mental status" for when a person's brain isn't working normally. The word "confusion" means a person isn't thinking clearly. Different kinds of conditions or situations can cause delirium. The most common reasons people have delirium include having a medical condition (such as pneumonia or urinary tract infection) or as a side effect of medicines, illegal substance use, or alcohol withdrawal, all of which cause a form of delrium considerd TME. Based on your presentation and your history, the most likley culprit is from taking too many of your medications. It is EXTREMELY important that you take your medications as instructed. You will be receiving further psychiatric treatment.         SECONDARY DISCHARGE DIAGNOSES  Diagnosis: Tachycardia  Assessment and Plan of Treatment: When you arrived at the emergency room, your vitals showed that your heart was fast, otherwise known as tachycardia. "Tachycardia" is the medical term for a heartbeat that is faster than normal. Sometimes, a fast heartbeat is a normal response to exercising or feeling excited or anxious. But other times, a fast heartbeat is a sign of another problem. Exercising or feeling excited, anxious, or afraid can all cause tachycardia.  Other things that can cause tachycardia include: having too many drinks with caffeine, such as coffee, tea, or soda; smoking or using chewing tobacco; taking certain illegal drugs, such as cocaine; having a fever; and having a thyroid condition. We ran tests to determine the cause of your tachycardia, including an electrocardiogram (ECG). Based on these results and other clinical findings, the most probable cause is from the medications you consumed. As mentioned before, it is critically important to take your medication as instructed.

## 2023-07-03 NOTE — PROGRESS NOTE ADULT - SUBJECTIVE AND OBJECTIVE BOX
OVERNIGHT EVENTS:    SUBJECTIVE / INTERVAL HPI: Patient seen and examined at bedside.     VITAL SIGNS:  Vital Signs Last 24 Hrs  T(C): 37.1 (03 Jul 2023 12:38), Max: 38 (02 Jul 2023 15:17)  T(F): 98.7 (03 Jul 2023 12:38), Max: 100.4 (02 Jul 2023 15:17)  HR: 90 (03 Jul 2023 12:38) (88 - 98)  BP: 105/66 (03 Jul 2023 12:38) (99/61 - 116/70)  BP(mean): --  RR: 17 (03 Jul 2023 12:38) (17 - 18)  SpO2: 96% (03 Jul 2023 12:38) (93% - 97%)    Parameters below as of 03 Jul 2023 12:38  Patient On (Oxygen Delivery Method): room air        PHYSICAL EXAM:    General: NAD  HEENT: NC/AT; PERRL, anicteric sclera; MMM  Neck: supple w/o palpable nodularity  Cardiovascular: +S1/S2; RRR  Respiratory: CTA B/L; no W/R/R  Gastrointestinal: soft, NT/ND; +BSx4  Extremities: WWP; no edema, clubbing or cyanosis  Vascular: 2+ radial, DP/PT pulses B/L  Neurological: AAOx3; no focal deficits    MEDICATIONS:  MEDICATIONS  (STANDING):  atorvastatin 10 milliGRAM(s) Oral at bedtime  carvedilol 3.125 milliGRAM(s) Oral every 12 hours  clopidogrel Tablet 75 milliGRAM(s) Oral daily  enoxaparin Injectable 30 milliGRAM(s) SubCutaneous every 24 hours  levothyroxine 25 MICROGram(s) Oral daily  mirtazapine 7.5 milliGRAM(s) Oral at bedtime  polyethylene glycol 3350 17 Gram(s) Oral every 12 hours  sacubitril 24 mG/valsartan 26 mG 1 Tablet(s) Oral two times a day  senna 2 Tablet(s) Oral at bedtime    MEDICATIONS  (PRN):  acetaminophen     Tablet .. 650 milliGRAM(s) Oral every 6 hours PRN Temp greater or equal to 38C (100.4F), Mild Pain (1 - 3)  bisacodyl 5 milliGRAM(s) Oral every 12 hours PRN Constipation  melatonin 3 milliGRAM(s) Oral at bedtime PRN Insomnia      ALLERGIES:  Allergies    codeine (Vomiting)    Intolerances        LABS:                        10.2   8.11  )-----------( 169      ( 03 Jul 2023 08:05 )             32.3     07-03    141  |  105  |  16  ----------------------------<  103<H>  3.6   |  27  |  0.76    Ca    8.8      03 Jul 2023 08:05  Phos  1.9     07-03  Mg     2.1     07-03    TPro  6.4  /  Alb  3.5  /  TBili  0.4  /  DBili  x   /  AST  17  /  ALT  11  /  AlkPhos  50  07-03      Urinalysis Basic - ( 03 Jul 2023 08:05 )    Color: x / Appearance: x / SG: x / pH: x  Gluc: 103 mg/dL / Ketone: x  / Bili: x / Urobili: x   Blood: x / Protein: x / Nitrite: x   Leuk Esterase: x / RBC: x / WBC x   Sq Epi: x / Non Sq Epi: x / Bacteria: x      CAPILLARY BLOOD GLUCOSE      POCT Blood Glucose.: 114 mg/dL (01 Jul 2023 15:50)      RADIOLOGY & ADDITIONAL TESTS: Reviewed.  
Patient is a 77y.o F with PMH of MI (Sept 2022), depression, anorexia that presented with altered mental status since. Per EMS, she was found at home lethargic with two empty bottles of medication next to her, one unlabeled and one was mirtazapine. Per the patient's son, the patient's friend went to check on her and found her in bed, awake but not responding to questions. She then was brought here to the ED by EMS. He reported that patient lives alone, and at baseline is able to ambulate without assistance . Since coming to the ED, he reports that he has been able to speak to her and she seemed confused. When trying to ambulate in the ED, she seemed to lose balance. She denies known SI or suicide attempts but does report that she has had worsening depression in the last few months requiring changes to her meds.   When reassessing patient, she is now alert and oriented, She notes the last thing she remembers is being in bed in the morning and then waking up to the EMS in her room. She has never had a similar episode before.       SUBJECTIVE / INTERVAL HPI: Patient seen and examined at bedside. Reports feeling better. Patient is AOx3 (6/2)     VITAL SIGNS:  Vital Signs Last 24 Hrs  T(C): 37.1 (03 Jul 2023 12:38), Max: 37.1 (03 Jul 2023 12:38)  T(F): 98.7 (03 Jul 2023 12:38), Max: 98.7 (03 Jul 2023 12:38)  HR: 90 (03 Jul 2023 12:38) (88 - 96)  BP: 105/66 (03 Jul 2023 12:38) (99/61 - 106/61)  BP(mean): --  RR: 17 (03 Jul 2023 12:38) (17 - 18)  SpO2: 96% (03 Jul 2023 12:38) (93% - 97%)    Parameters below as of 03 Jul 2023 12:38  Patient On (Oxygen Delivery Method): room air        PHYSICAL EXAM:    General: Patient is visibly cachectic.   HEENT: NC/AT; PERRL, anicteric sclera; MMM  Neck: supple w/o palpable nodularity  Cardiovascular: +S1/S2; RRR  Respiratory: CTA B/L; no W/R/R  Gastrointestinal: soft, NT/ND; +BSx4  Extremities: WWP; no edema, clubbing or cyanosis  Vascular: 2+ radial, DP/PT pulses B/L  Neurological: AAOx3; no focal deficits    MEDICATIONS:  MEDICATIONS  (STANDING):  atorvastatin 10 milliGRAM(s) Oral at bedtime  carvedilol 3.125 milliGRAM(s) Oral every 12 hours  clopidogrel Tablet 75 milliGRAM(s) Oral daily  enoxaparin Injectable 30 milliGRAM(s) SubCutaneous every 24 hours  levothyroxine 25 MICROGram(s) Oral daily  mirtazapine 7.5 milliGRAM(s) Oral at bedtime  polyethylene glycol 3350 17 Gram(s) Oral every 12 hours  sacubitril 24 mG/valsartan 26 mG 1 Tablet(s) Oral two times a day  senna 2 Tablet(s) Oral at bedtime    MEDICATIONS  (PRN):  acetaminophen     Tablet .. 650 milliGRAM(s) Oral every 6 hours PRN Temp greater or equal to 38C (100.4F), Mild Pain (1 - 3)  bisacodyl 5 milliGRAM(s) Oral every 12 hours PRN Constipation  melatonin 3 milliGRAM(s) Oral at bedtime PRN Insomnia      ALLERGIES:  Allergies    codeine (Vomiting)    Intolerances        LABS:                        10.4   11.75  )-----------( 164     ( 02 Jul 2023 )             32.5      143  |  108  |  20  ----------------------------<  103<H>  3.9  |  26  |  0.67    Ca    8.6       Phos  2.6       Mg     2.0         Color: x / Appearance: x / SG: x / pH: x  Gluc: 103 mg/dL / Ketone: x  / Bili: x / Urobili: x   Blood: x / Protein: x / Nitrite: x   Leuk Esterase: x / RBC: x / WBC x   Sq Epi: x / Non Sq Epi: x / Bacteria: x      CAPILLARY BLOOD GLUCOSE          RADIOLOGY & ADDITIONAL TESTS: Reviewed.

## 2023-07-03 NOTE — DISCHARGE NOTE PROVIDER - ATTENDING DISCHARGE PHYSICAL EXAMINATION:
Patient was seen and examined at bedside on 7/3/2023 at 11 am. Patient has no acute complaints. Denies SOB, CP. ROS is otherwise negative. Vitals, labwork and pertinent imaging reviewed - tachycardia has resolved. Physical exam - NAD, AAO x 4, PERRLA, EOMI, supple neck, chest - CTA b/l, CV - rrr, s1s2, no m/r/g, abd - soft, + BS, ext - wwp, psych - normal affect, skin - no rash, patient appears cachectic and malnourished    Plan  -Nutrition consulted  -Psych consulted will admit patient to 8U  -Tachycardia has resolved likely 2/2 medication overdose/abrupt discontinuation of Coreg  -Pt passed TOV  -Aggressive bowel regimen  -Home meds restarted, psych meds to be adjusted by inpatient psychiatry team  Eliquis 5 mg BID? -- > need to confirm

## 2023-07-03 NOTE — CONSULT NOTE ADULT - SUBJECTIVE AND OBJECTIVE BOX
Patient is a 77y old  Female who presents with a chief complaint of UNABLE TO ABLULATE     (02 Jul 2023 11:27)      HPI:  Patient is a 77y.o F with PMH of MI (Sept 2022), depression, anorexia that present with altered mental status since today. Per EMS, she was found at home lethargic with two empty bottles of medication next to her, one unlabeled and one was mirtazapine. Per the patient's son, the patient's friend went to check on her today and found her in bed, awake but not responding to questions. She then was brought here to the ED by EMS. He reports that patient lives alone, and at baseline is able to ambulate without assistance . Since coming to the ED, he reports that he has been able to speak to her and she seemed confused. When trying to ambulate in the ED, she seemed to lose balance. He denies known SI or suicide attempts but does report that she has had worsening depression in the last few months requiring changes to her meds.   When reassessing patient, she is now alert and oriented, She notes she spoke to her son earlier today, and last thing she remembers is being in bed in the morning and then waking up to the EMS in her room. She has never had a similar episode before.     In the ED:   VS: /67, , T 100, RR 18, SPO2 96 RA  Labs: Significant for PT 15.9, INR 1.33,  Of note: Electrolytes WNL, UA, Utox-, Troponin wnl, CK-   She was received Aspirin, Carvedilol Tylenol, clopidogrel, Entresto and 1 L IV NS  (01 Jul 2023 17:52)    PAST MEDICAL & SURGICAL HISTORY:  HTN (hypertension)      HLD (hyperlipidemia)      CAD (coronary artery disease)      Myocardial infarct      Major depression      Anorexia        MEDICATIONS  (STANDING):  atorvastatin 10 milliGRAM(s) Oral at bedtime  carvedilol 3.125 milliGRAM(s) Oral every 12 hours  clopidogrel Tablet 75 milliGRAM(s) Oral daily  enoxaparin Injectable 30 milliGRAM(s) SubCutaneous every 24 hours  levothyroxine 25 MICROGram(s) Oral daily  mirtazapine 7.5 milliGRAM(s) Oral at bedtime  polyethylene glycol 3350 17 Gram(s) Oral every 12 hours  potassium phosphate IVPB 15 milliMole(s) IV Intermittent once  sacubitril 24 mG/valsartan 26 mG 1 Tablet(s) Oral two times a day  senna 2 Tablet(s) Oral at bedtime    MEDICATIONS  (PRN):  acetaminophen     Tablet .. 650 milliGRAM(s) Oral every 6 hours PRN Temp greater or equal to 38C (100.4F), Mild Pain (1 - 3)  bisacodyl 5 milliGRAM(s) Oral every 12 hours PRN Constipation  melatonin 3 milliGRAM(s) Oral at bedtime PRN Insomnia          FAMILY HISTORY:      CBC Full  -  ( 03 Jul 2023 08:05 )  WBC Count : 8.11 K/uL  RBC Count : 3.19 M/uL  Hemoglobin : 10.2 g/dL  Hematocrit : 32.3 %  Platelet Count - Automated : 169 K/uL  Mean Cell Volume : 101.3 fl  Mean Cell Hemoglobin : 32.0 pg  Mean Cell Hemoglobin Concentration : 31.6 gm/dL  Auto Neutrophil # : 6.87 K/uL  Auto Lymphocyte # : 0.34 K/uL  Auto Monocyte # : 0.74 K/uL  Auto Eosinophil # : 0.10 K/uL  Auto Basophil # : 0.02 K/uL  Auto Neutrophil % : 84.8 %  Auto Lymphocyte % : 4.2 %  Auto Monocyte % : 9.1 %  Auto Eosinophil % : 1.2 %  Auto Basophil % : 0.2 %      07-03    141  |  105  |  16  ----------------------------<  103<H>  3.6   |  27  |  0.76    Ca    8.8      03 Jul 2023 08:05  Phos  1.9     07-03  Mg     2.1     07-03    TPro  6.4  /  Alb  3.5  /  TBili  0.4  /  DBili  x   /  AST  17  /  ALT  11  /  AlkPhos  50  07-03      Urinalysis Basic - ( 03 Jul 2023 08:05 )    Color: x / Appearance: x / SG: x / pH: x  Gluc: 103 mg/dL / Ketone: x  / Bili: x / Urobili: x   Blood: x / Protein: x / Nitrite: x   Leuk Esterase: x / RBC: x / WBC x   Sq Epi: x / Non Sq Epi: x / Bacteria: x        Radiology :     < from: CT Head No Cont (07.01.23 @ 12:04) >  ACC: [id number] EXAM:  CT BRAIN   ORDERED BY: DELORIS FERREIRA     PROCEDURE DATE:  07/01/2023          INTERPRETATION:  PROCEDURE: CT head without intravenous contrast    INDICATION: Altered mental status    TECHNIQUE: Multiple axial images were obtained at 5 mm intervals from the   skull base to the vertex. Sagittal and coronal reformatted images were   obtained from the axial data set. The images were reviewed in brain and   bone windows.    COMPARISON: CT head 9/26/2022    FINDINGS: The CT examination demonstrates age-appropriate volume loss.   There is no midline shift or extra axial collections. The gray white   differentiation appears within normal limits. There is no intracranial   hemorrhage or acute transcortical infarct. There are mild patchy areas of   hypodensity within the periventricular white matter which is nonspecific   and may represent the sequela of small vessel ischemic disease in this   patient.    The bony windows demonstrates no fractures. The lenses are absent which   may be secondary to prior cataract surgery. The visualized paranasal   sinuses are within normal limits. The mastoid air cells are well aerated.    Limited evaluation of the facial regions demonstrates bilateral swelling   and clinical correlation is recommended.    IMPRESSION: No intracranial hemorrhage or acute transcortical infarct.      < from: Xray Chest 1 View- PORTABLE-Urgent (Xray Chest 1 View- PORTABLE-Urgent .) (07.01.23 @ 12:10) >    ACC: [id number] EXAM:  XR CHEST PORTABLE URGENT 1V   ORDERED BY: DELORIS FERREIRA     PROCEDURE DATE:  07/01/2023          INTERPRETATION:  Clinical History: AMS    Frontal examination of the chest demonstrates the heart to be within   normal limits in transverse diameter. No acute infiltrates. Calcification   aortic knob. Visualized osseous structures are within normal limits.    IMPRESSION: No acute infiltrates           Review of Systems : per HPI               Vital Signs Last 24 Hrs  T(C): 36.9 (03 Jul 2023 05:55), Max: 38 (02 Jul 2023 15:17)  T(F): 98.5 (03 Jul 2023 05:55), Max: 100.4 (02 Jul 2023 15:17)  HR: 88 (03 Jul 2023 05:55) (88 - 107)  BP: 106/61 (03 Jul 2023 05:55) (99/61 - 130/71)  BP(mean): --  RR: 18 (03 Jul 2023 05:55) (16 - 18)  SpO2: 97% (03 Jul 2023 05:55) (93% - 98%)    Parameters below as of 03 Jul 2023 05:55  Patient On (Oxygen Delivery Method): room air            Physical Exam :   77 y o woman  lying comfortably in semi Amaya's position , awake , alert , no acute complaints     Head : normocephalic , atraumatic    Eyes : PERRLA , EOMI , no nystagmus , sclera anicteric    ENT / FACE : neg nasal discharge , uvula midline , no oropharyngeal erythema / exudate    Neck : supple , negative JVD , negative carotid bruits , no thyromegaly    Chest : CTA bilaterally , neg wheeze / rhonchi / rales / crackles / egophany    Cardiovascular : regular rate and rhythm ,  II/VI systolic murmur     Abdomen : soft , non distended , non tender to palpation in all 4 quadrants ,  normal bowel sounds     Extremities : WWP , neg cyanosis /clubbing / edema     Neurologic Exam :    Alert and oriented to person , place , date/year , speech fluent w/o dysarthria , follows commands , recent and remote memory intact , repetition intact , comprehension intact ,  attention/concentration intact , fund of knowledge appropriate    Cranial Nerves:     II :                         pupils equal , round and reactive to light , visual fields intact   III/ IV/VI :              extraocular movements intact , neg nystagmus , neg ptosis  V :                        facial sensation intact , V1-3 normal  VII :                      face symmetric , no droop , normal eye closure and smile  VIII :                     hearing intact to finger rub bilaterally  IX and X :             no hoarseness , gag intact , palate/ uvula rise symmetrically  XI :                       SCM / trapezius strength intact bilateral  XII :                      no tongue deviation    Motor Exam:          Right UE:               no focal weakness ,  > 3+/5 throughout  , no drift     Left UE:                 no focal weakness ,  > 3+/5 throughout  , no drift         Right LE:    no focal weakness ,  > 3+/5 throughout      Left LE:    no focal weakness ,  > 3+/5 throughout         Sensation :         intact to light touch x 4 extremities                            no neglect or extinction on double simultaneous testing      DTR :     biceps/brachioradialis : equal                      patella/ankle : equal     neg Babinski        Coordination :      Finger to Nose :  neg dysmetria bilaterally       Gait :  not tested          PM&R Impression :     admitted for AMS     - no acute pathology on CT brain imaging      - no focal weakness         Recommendations / Plan :                1) Physical / Occupational therapy focusing on therapeutic exercises , equipment evaluation , bed mobility/transfer out of bed evaluation , progressive ambulation with assistive devices prn .    2) Current disposition plan recommendation  :       d/c home with no rehab needs

## 2023-07-03 NOTE — BH PATIENT PROFILE - FALL HARM RISK - HARM RISK INTERVENTIONS

## 2023-07-03 NOTE — DISCHARGE NOTE PROVIDER - NSDCCPTREATMENT_GEN_ALL_CORE_FT
PRINCIPAL PROCEDURE  Procedure: CT head wo con  Findings and Treatment: No intracranial hemorrhage or acute transcortical infarct.      SECONDARY PROCEDURE  Procedure: Portable x-ray of chest, single view  Findings and Treatment: No acute infiltrates

## 2023-07-03 NOTE — BH CONSULTATION LIAISON PROGRESS NOTE - NSBHCHARTREVIEWLAB_PSY_A_CORE FT
10.2   8.11  )-----------( 169      ( 03 Jul 2023 08:05 )             32.3   07-03    141  |  105  |  16  ----------------------------<  103<H>  3.6   |  27  |  0.76    Ca    8.8      03 Jul 2023 08:05  Phos  1.9     07-03  Mg     2.1     07-03    TPro  6.4  /  Alb  3.5  /  TBili  0.4  /  DBili  x   /  AST  17  /  ALT  11  /  AlkPhos  50  07-03

## 2023-07-03 NOTE — DISCHARGE NOTE PROVIDER - HOSPITAL COURSE
#Discharge: do not delete    АННА YOUNG is a 77y Female with a past medical history of CAD, depression, and anorexia.    Presented with altered mental status, found to have toxic metabolic encephalopathy.    Problem List/Main Diagnoses (system-based):   # Toxic metabolic encephalopathy  - s/p rehab consultl: recommended PT/OT, d/c home with no rehab needs  - passed TOV  - Passed small BM this morning  - Pending ambulation and water enema  - Medically ready for DC    # Tachycardia  RESOLVED. HR 90bpm 07/03  - likely due to TME    # Labile blood pressure  - Mx with home medications    Inpatient treatment course:      Patient was discharged to: (home/[facility]/acute rehab/hospice, etc. and w/ home health/home PT/RN/home O2)    New medications:   Changes to old medications:  Medications that were stopped:    Items to follow up as outpatient:    Physical exam at the time of discharge:       LABS & STUDIES:  SARS-CoV-2: Silvia (02 Jul 2023 18:51)   #Discharge: do not delete    АННА YOUNG is a 77y Female with a past medical history of CAD, depression, and anorexia.    Presented with altered mental status, found to have toxic metabolic encephalopathy.    Problem List/Main Diagnoses (system-based):   # Toxic metabolic encephalopathy  - s/p rehab consultl: recommended PT/OT, d/c home with no rehab needs  - passed TOV  - Passed small BM this morning  - Pending ambulation and water enema  - Medically ready for DC    # Tachycardia  RESOLVED. HR 90bpm 07/03  - likely due to TME    # Labile blood pressure  - Mx with home medications    Inpatient treatment course:      Patient was discharged to: (home/[facility]/acute rehab/hospice, etc. and w/ home health/home PT/RN/home O2)    New medications:   Changes to old medications:  Medications that were stopped:    Items to follow up as outpatient:    Physical exam at the time of discharge:   .  VITAL SIGNS:  T(C): 37.1 (07-03-23 @ 12:38), Max: 38 (07-02-23 @ 15:17)  T(F): 98.7 (07-03-23 @ 12:38), Max: 100.4 (07-02-23 @ 15:17)  HR: 90 (07-03-23 @ 12:38) (88 - 98)  BP: 105/66 (07-03-23 @ 12:38) (99/61 - 116/70)  BP(mean): --  RR: 17 (07-03-23 @ 12:38) (17 - 18)  SpO2: 96% (07-03-23 @ 12:38) (93% - 97%)  Wt(kg): --    PHYSICAL EXAM:    Constitutional: WDWN resting comfortably in bed; NAD  Head: NC/AT  Eyes: PERRL, EOMI, anicteric sclera  ENT: no nasal discharge; uvula midline, no oropharyngeal erythema or exudates; MMM  Neck: supple; no JVD or thyromegaly  Respiratory: CTA B/L; no W/R/R, no retractions  Cardiac: +S1/S2; RRR; no M/R/G; PMI non-displaced  Gastrointestinal: abdomen soft, NT/ND; no rebound or guarding; +BSx4  Back: spine midline, no bony tenderness or step-offs; no CVAT B/L  Extremities: WWP, no clubbing or cyanosis; no peripheral edema  Musculoskeletal: NROM x4; no joint swelling, tenderness or erythema  Vascular: 2+ radial, femoral, DP/PT pulses B/L  Dermatologic: skin warm, dry and intact; no rashes, wounds, or scars  Lymphatic: no submandibular or cervical LAD  Neurologic: AAOx3; CNII-XII grossly intact; no focal deficits  Psychiatric: affect and characteristics of appearance, verbalizations, behaviors are appropriate      LABS & STUDIES:  SARS-CoV-2: Silvia (02 Jul 2023 18:51)   #Discharge: do not delete    АННА YOUNG is a 77y Female with a past medical history of CAD, depression, and anorexia.    Presented with altered mental status, found to have toxic metabolic encephalopathy.    Problem List/Main Diagnoses (system-based):   # Toxic metabolic encephalopathy 2/2 medication overdose (likely mirtazapine)   - s/p rehab consult: recommended PT/OT, d/c home with no rehab needs  - passed TOV  - Passed small BM this morning  - Pending ambulation and water enema  - Medically ready for DC    #Depression: evaluated by psych as in-patient with reinstatement of mirtazapine 7.5 qHS. Patient agreed to voluntary in-patient psychiatric evaluation. After passing TOV and tachycardia resolved, patient discharged and subsequently admitted to [facility].     # Tachycardia: d/t medication intoxication   RESOLVED. HR 90bpm 07/03  - likely due to TME    # Labile blood pressure  - Mx with home medications    Inpatient treatment course:      Patient was discharged to: ED for subsequent admission to [facility] (in-patient psych unit)    New medications: miralax and senna   Changes to old medications: mirtazapine 7.5mg PO qHS (decreased from 30mg PO qd)   Medications that were stopped: per psych recs viibryd, vanlafaxine , and olanzapine discontinued to be restarted slowly while admitted to inpatient psych unit.     Items to follow up as outpatient: Psych medication titration    Physical exam at the time of discharge:   .  VITAL SIGNS:  T(C): 37.1 (07-03-23 @ 12:38), Max: 38 (07-02-23 @ 15:17)  T(F): 98.7 (07-03-23 @ 12:38), Max: 100.4 (07-02-23 @ 15:17)  HR: 90 (07-03-23 @ 12:38) (88 - 98)  BP: 105/66 (07-03-23 @ 12:38) (99/61 - 116/70)  BP(mean): --  RR: 17 (07-03-23 @ 12:38) (17 - 18)  SpO2: 96% (07-03-23 @ 12:38) (93% - 97%)  Wt(kg): --    PHYSICAL EXAM:    Constitutional: WDWN resting comfortably in bed; NAD  Head: NC/AT  Eyes: PERRL, EOMI, anicteric sclera  ENT: no nasal discharge; uvula midline, no oropharyngeal erythema or exudates; MMM  Neck: supple; no JVD or thyromegaly  Respiratory: CTA B/L; no W/R/R, no retractions  Cardiac: +S1/S2; RRR; no M/R/G; PMI non-displaced  Gastrointestinal: abdomen soft, NT/ND; no rebound or guarding; +BSx4  Back: spine midline, no bony tenderness or step-offs; no CVAT B/L  Extremities: WWP, no clubbing or cyanosis; no peripheral edema  Musculoskeletal: NROM x4; no joint swelling, tenderness or erythema  Vascular: 2+ radial, femoral, DP/PT pulses B/L  Dermatologic: skin warm, dry and intact; no rashes, wounds, or scars  Lymphatic: no submandibular or cervical LAD  Neurologic: AAOx3; CNII-XII grossly intact; no focal deficits  Psychiatric: affect and characteristics of appearance, verbalizations, behaviors are appropriate      LABS & STUDIES:  SARS-CoV-2: Silvia (02 Jul 2023 18:51)   #Discharge: do not delete    АННА YOUNG is a 77y Female with a past medical history of CAD, depression, and anorexia.    Presented with altered mental status, found to have toxic metabolic encephalopathy.    Problem List/Main Diagnoses (system-based):   # Toxic encephalopathy 2/2 medication overdose (likely mirtazapine)   - s/p rehab consult: recommended PT/OT, d/c home with no rehab needs  - passed TOV  - Passed small BM this morning  - Pending ambulation and water enema  - Medically ready for DC    #Severe Protein Calorie Malnutrition  - Nutrition consulted  -C/w nutritional supplements    #Major Depression: evaluated by psych as in-patient with reinstatement of mirtazapine 7.5 qHS. Patient agreed to voluntary in-patient psychiatric evaluation. After passing TOV and tachycardia resolved, patient discharged and subsequently admitted to [facility].     # Tachycardia: d/t medication intoxication   RESOLVED. HR 90bpm 07/03  - likely due to TME    # Labile blood pressure  - Mx with home medications    Inpatient treatment course:      Patient was discharged to: ED for subsequent admission to *[facility] (in-patient psych unit)    New medications: miralax and senna   Changes to old medications: mirtazapine 7.5mg PO qHS (decreased from 30mg PO qd)   Medications that were stopped: per psych recs viibryd, vanlafaxine , and olanzapine discontinued to be restarted slowly while admitted to inpatient psych unit.     Items to follow up as outpatient: Psych medication titration    Physical exam at the time of discharge:   .  VITAL SIGNS:  T(C): 37.1 (07-03-23 @ 12:38), Max: 38 (07-02-23 @ 15:17)  T(F): 98.7 (07-03-23 @ 12:38), Max: 100.4 (07-02-23 @ 15:17)  HR: 90 (07-03-23 @ 12:38) (88 - 98)  BP: 105/66 (07-03-23 @ 12:38) (99/61 - 116/70)  BP(mean): --  RR: 17 (07-03-23 @ 12:38) (17 - 18)  SpO2: 96% (07-03-23 @ 12:38) (93% - 97%)  Wt(kg): --    PHYSICAL EXAM:    Constitutional: WDWN resting comfortably in bed; NAD  Head: NC/AT  Eyes: PERRL, EOMI, anicteric sclera  ENT: no nasal discharge; uvula midline, no oropharyngeal erythema or exudates; MMM  Neck: supple; no JVD or thyromegaly  Respiratory: CTA B/L; no W/R/R, no retractions  Cardiac: +S1/S2; RRR; no M/R/G; PMI non-displaced  Gastrointestinal: abdomen soft, NT/ND; no rebound or guarding; +BSx4  Back: spine midline, no bony tenderness or step-offs; no CVAT B/L  Extremities: WWP, no clubbing or cyanosis; no peripheral edema  Musculoskeletal: NROM x4; no joint swelling, tenderness or erythema  Vascular: 2+ radial, femoral, DP/PT pulses B/L  Dermatologic: skin warm, dry and intact; no rashes, wounds, or scars  Lymphatic: no submandibular or cervical LAD  Neurologic: AAOx3; CNII-XII grossly intact; no focal deficits  Psychiatric: affect and characteristics of appearance, verbalizations, behaviors are appropriate      LABS & STUDIES:  SARS-CoV-2: Silvia (02 Jul 2023 18:51)

## 2023-07-03 NOTE — BH CONSULTATION LIAISON PROGRESS NOTE - NSBHCONSULTRECOMMENDOTHER_PSY_A_CORE FT
PLAN    1. Please continue remeron 7.5mg qhs  2. Patient was previously on klonopin 1mg BID for insomnia. Will defer to primary team if they would like to continue this medication, but alternative medication for sleep would be preferred given associated risk of benzodiazapine for risk of falls and oversedation due to patient's age.  PLAN    1. Please continue remeron 7.5mg qhs  2. Patient was previously on klonopin 1mg BID for insomnia. Will defer to primary team if they would like to continue this medication, but alternative medication for sleep would be preferred given associated risk of benzodiazapine for risk of falls and oversedation due to patient's age.   3. No need for 1:1 observation  4. Patient planned for voluntary psychiatric admission, pending medical clearance (void trial at 6pm)  5. CL to follow until pt is transferred to inpatient psychiatry PLAN    1. Please continue remeron 7.5mg qhs  2. Patient was previously on klonopin 1mg BID for insomnia. Will defer to primary team if they would like to continue this medication, but alternative medication for sleep would be preferred given associated risk of benzodiazapine for risk of falls and oversedation due to patient's age.  3 CIWA monitoring for withdrawal  4. No need for 1:1 observation  5. Patient planned for voluntary psychiatric admission, pending medical clearance (void trial at 6pm)  6. CL to follow until pt is transferred to inpatient psychiatry PLAN    1. Please continue remeron 7.5mg qhs  2. Patient was previously on klonopin 1mg BID for insomnia. Will defer to primary team if they would like to continue this medication, but alternative medication for sleep would be preferred given associated risk of benzodiazapine for risk of falls and oversedation due to patient's age.  3 CIWA monitoring for withdrawal;  4. No need for 1:1 observation  5. Patient planned for voluntary psychiatric admission, pending medical clearance (void trial at 6pm)  6. CL to follow until pt is transferred to inpatient psychiatry

## 2023-07-03 NOTE — CHART NOTE - NSCHARTNOTEFT_GEN_A_CORE
On-Call Attg Note  Pt arrived to [facility], ambulating normally, mildly anxious, VSS, no sx of bzd withdrawal.  Pt alert, oriented, attentive, good recall of recent events, denies SI, TC future oriented, pt reiterates it is confusing to take many medications and simplifying them will be helpful.  VOL in chart.  No indication for 1:1.    MDD  continue inpt care, no acute med changes indicated

## 2023-07-03 NOTE — BH CONSULTATION LIAISON PROGRESS NOTE - NSBHASSESSMENTFT_PSY_ALL_CORE
Jatinder Gonsales is a 78 yo  woman with PMH of CAD s/p MI (Sept 2022), PPH depression, anorexia, one inpatient psych hospitalization, that present with altered mental status (found down by family) with concern for ingestion of excessive medication as EMS found empty bottles by the bedside. Psychiatry consulted for evaluation of depression and suicidality.    Upon assessment today, patient continues to deny that she intentionally overdosed on medications in an attempt to end her life, but does endorse worsening symptoms do depression and anhedonia. Patient's family & outpatient psychiatrist, as well as patient herself, describe history of worsening depression/anxiety to the point of complete isolation, debilitating depression and anxiety that cause significant distress, hopelessness, and weight loss. Patient is agreeable to voluntary psychiatric admission. Patient would benefit from inpatient psychiatric hospitalization after medical clearance for acute stabilization, medication management, aftercare planning, diagnostic clarity.

## 2023-07-03 NOTE — BH CONSULTATION LIAISON PROGRESS NOTE - NSBHCHARTREVIEWVS_PSY_A_CORE FT
Vital Signs Last 24 Hrs  T(C): 36.9 (03 Jul 2023 05:55), Max: 38 (02 Jul 2023 15:17)  T(F): 98.5 (03 Jul 2023 05:55), Max: 100.4 (02 Jul 2023 15:17)  HR: 88 (03 Jul 2023 05:55) (88 - 107)  BP: 106/61 (03 Jul 2023 05:55) (99/61 - 130/71)  BP(mean): --  RR: 18 (03 Jul 2023 05:55) (16 - 18)  SpO2: 97% (03 Jul 2023 05:55) (93% - 98%)    Parameters below as of 03 Jul 2023 05:55  Patient On (Oxygen Delivery Method): room air

## 2023-07-03 NOTE — PROGRESS NOTE ADULT - NUTRITIONAL ASSESSMENT
This patient has been assessed with a concern for Malnutrition and has been determined to have a diagnosis/diagnoses of Severe protein-calorie malnutrition and Underweight (BMI < 19).    This patient is being managed with:   Diet Regular-  Supplement Feeding Modality:  Oral  Ensure Plus High Protein Cans or Servings Per Day:  1       Frequency:  Two Times a day  Entered: Jul 2 2023 11:51AM

## 2023-07-03 NOTE — DISCHARGE NOTE PROVIDER - DETAILS OF MALNUTRITION DIAGNOSIS/DIAGNOSES
This patient has been assessed with a concern for Malnutrition and was treated during this hospitalization for the following Nutrition diagnosis/diagnoses:     -  07/02/2023: Severe protein-calorie malnutrition   -  07/02/2023: Underweight (BMI < 19)

## 2023-07-03 NOTE — BH CONSULTATION LIAISON PROGRESS NOTE - CURRENT MEDICATION
MEDICATIONS  (STANDING):  atorvastatin 10 milliGRAM(s) Oral at bedtime  carvedilol 3.125 milliGRAM(s) Oral every 12 hours  clopidogrel Tablet 75 milliGRAM(s) Oral daily  enoxaparin Injectable 30 milliGRAM(s) SubCutaneous every 24 hours  levothyroxine 25 MICROGram(s) Oral daily  mirtazapine 7.5 milliGRAM(s) Oral at bedtime  polyethylene glycol 3350 17 Gram(s) Oral every 12 hours  potassium phosphate IVPB 15 milliMole(s) IV Intermittent once  sacubitril 24 mG/valsartan 26 mG 1 Tablet(s) Oral two times a day  senna 2 Tablet(s) Oral at bedtime    MEDICATIONS  (PRN):  acetaminophen     Tablet .. 650 milliGRAM(s) Oral every 6 hours PRN Temp greater or equal to 38C (100.4F), Mild Pain (1 - 3)  bisacodyl 5 milliGRAM(s) Oral every 12 hours PRN Constipation  melatonin 3 milliGRAM(s) Oral at bedtime PRN Insomnia

## 2023-07-03 NOTE — CONSULT NOTE ADULT - ASSESSMENT
I M     77 y o F with PMH of MI (Sept 2022), depression, anorexia who present with altered mental status since today after being found lethargic at home by EMS with 2 empty bottles of medications next to her. She was found to have Utox negative, WBC wnm, afebrile, negative CXR, negative CT head, Pt was admitted for AMS.     Problem/Plan - 1:  ·  Problem: Altered mental state.   ·  Plan: Pt presents with AMS since this morning, found by EMS lethargic with 2 empty bottles of medications, one unlabeled, the other mirtazepine. Infectious vs thyroid vs metabolic vs TIA vs medication  induced  -WBC wnl, afebrile, CXR shows no infiltrate , UA neg.   -Electrolytes wnl, U tox-  -Hx of CAD, however no focal deficit, low suspicion for TIA. CT head negative.    Plan:   -Enhanced observation   -F/u Blood cultures   -Phosphorus level  -f/u CMP  - F/u TSH  - holding home olanzapine, mirtazipine, vilazodone  -Psych consult  -PT consult.    Problem/Plan - 2:  ·  Problem: Tachycardia.   ·  Plan: HR elevated to 125->103. EKG on admission normal sinus. Troponin negative. Etiology unclear, possible sepsis vs dehydration vs anxiety vs metabolic.     Plan:  Repeat EKG in AM if persistently tachycardic  - F/u TSH  - Hold anxiety medications given AMS  - F/u blood cultures.    Problem/Plan - 3:  ·  Problem: Labile blood pressure.   ·  Plan: BP ranges from 188/104 to 88/53 measured at bedside. s/p carvedilol, Entresto and 1L NS in the ED. Pt mentating well, asymptomatic.    Plan:   -NS 250cc bolus and reassess  - Hold off on home antihypertensives given systolics in 80-90s.    Problem/Plan - 4:  ·  Problem: CAD (coronary artery disease).   ·  Plan: HX of MI in Sep 2022. No stents placed.    Plan:   -C/w home atorvastatin and clopidogrel.    Problem/Plan - 5:  ·  Problem: Depression, major.   ·  Plan: HX of depression.     Plan   -hold home Mirtazapine, Olanzepine, Viibryd, Klonopin.    Problem/Plan - 6:  ·  Problem: Anorexia.   ·  Plan: Small body habitus, BMI 16.2    Plan:   -Nutrition consult.    Problem/Plan - 7:  ·  Problem: Hypothyroid.   ·  Plan: Hx of hypothyroidism    Plan:   -Cont with Levothyroxine 25mcg  - F/u TSH in AM.    Problem/Plan - 8:  ·  Problem: Prophylactic measure.   ·  Plan: F: s/p 1.25 L in ED  E: Replete as needed  N; Clear liquid diet pending speech and swallow  Dvt ppx: lovenox  Code status: FULL code  Dispo: [facility].

## 2023-07-03 NOTE — DISCHARGE NOTE PROVIDER - NSDCMRMEDTOKEN_GEN_ALL_CORE_FT
atorvastatin 10 mg oral tablet: 1 orally once a day  carvedilol 3.125 mg oral tablet: 1 orally 2 times a day  clonazePAM:   clopidogrel 75 mg oral tablet: 1 orally once a day  Entresto 24 mg-26 mg oral tablet: 1 orally 2 times a day  KlonoPIN 0.5 mg oral tablet: 1 tab(s) orally once a day (at bedtime)  KlonoPIN 1 mg oral tablet: 1 orally  levothyroxine 25 mcg (0.025 mg) oral capsule: 1 orally once a day  mirtazapine 30 mg oral tablet: 2 tab(s) orally once a day (at bedtime)  OLANZapine 20 mg oral tablet: 1 orally  PriLOSEC:   venlafaxine 150 mg oral capsule, extended release: 1 orally once a day  Viibryd 10 mg oral tablet: 1 orally  Viibryd 20 mg oral tablet: 1 orally  Vitamin D3 50 mcg (2000 intl units) oral capsule: 1 orally once a day   atorvastatin 10 mg oral tablet: 1 orally once a day  carvedilol 3.125 mg oral tablet: 1 orally 2 times a day  clopidogrel 75 mg oral tablet: 1 orally once a day  Entresto 24 mg-26 mg oral tablet: 1 orally 2 times a day  KlonoPIN 0.5 mg oral tablet: 0.5 tab(s) orally once a day (at bedtime) as needed for  anxiety max dose of 1 per day  levothyroxine 25 mcg (0.025 mg) oral capsule: 1 orally once a day  mirtazapine 30 mg oral tablet: 2 tab(s) orally once a day (at bedtime)  polyethylene glycol 3350 oral powder for reconstitution: 17 gram(s) orally every 12 hours  PriLOSEC:   senna leaf extract oral tablet: 2 tab(s) orally once a day (at bedtime)  Vitamin D3 50 mcg (2000 intl units) oral capsule: 1 orally once a day

## 2023-07-03 NOTE — DISCHARGE NOTE NURSING/CASE MANAGEMENT/SOCIAL WORK - PATIENT PORTAL LINK FT
You can access the FollowMyHealth Patient Portal offered by [facility] by registering at the following website: [email or web address]. By joining [employer]’s FollowMyHealth portal, you will also be able to view your health information using other applications (apps) compatible with our system.

## 2023-07-03 NOTE — BH PATIENT PROFILE - HOME MEDICATIONS
senna leaf extract oral tablet , 2 tab(s) orally once a day (at bedtime)  polyethylene glycol 3350 oral powder for reconstitution , 17 gram(s) orally every 12 hours  Vitamin D3 50 mcg (2000 intl units) oral capsule , 1 orally once a day  KlonoPIN 0.5 mg oral tablet , 0.5 tab(s) orally once a day (at bedtime) as needed for  anxiety max dose of 1 per day  levothyroxine 25 mcg (0.025 mg) oral capsule , 1 orally once a day  Entresto 24 mg-26 mg oral tablet , 1 orally 2 times a day  clopidogrel 75 mg oral tablet , 1 orally once a day  atorvastatin 10 mg oral tablet , 1 orally once a day  carvedilol 3.125 mg oral tablet , 1 orally 2 times a day  mirtazapine 30 mg oral tablet , 2 tab(s) orally once a day (at bedtime)  PriLOSEC

## 2023-07-03 NOTE — BH CONSULTATION LIAISON PROGRESS NOTE - NSBHFUPINTERVALHXFT_PSY_A_CORE
***INCOMPLETE NOTE*** Patient was seen and evaluated this morning. Chart was reviewed and no acute events were noted. PRN tylenol administered overnight. Upon approach, patient is lying in bed comfortably, son at bedside. Patient comfortable to continue interview with son present. Patient is alert and oriented and engages with interviewer. Patient is cooperative and answers all questions appropriately. Patient denies any current passive or active suicidal ideation, intent or plan. Patient reports good sleep and appetite. Denies side effects from medications.     Patient reports she does not recall events leading up to EMS coming to her home and bringing her to hospital. She continues to deny that it was in an attempt to end her life. She reports that she has been taking many various medications for both her cardiac and psychiatric illness, and spends a large portion of her day occupied with when to take these medications. She reports that due to many changes to her psychiatric medications, it is possible she may have taken an incorrect amount of one. She reports she feels "very depressed for months" and worries she is "becoming agoraphobic." She denies passive suicidal ideation, but reports she "looks forward to the nighttime," because it means "I got through another day." She reports she feels similar to how she felt prior to last hospitalization 7 years ago. She reports that hospitalization was very beneficial and reports she feels she would benefit from psychiatric hospitalization.    Spoke with patient's outpatient psychiatrist Dr. Briones [phone]. Reports he has been working with patient for almost 8 years. Confirms diagnosis of recurrent MDD. He reports patient has had many changes to psychiatric medications due to continued endorsement of depressive symptoms. He reports she did not report any suicidal ideation, intent or plan and confirms patient does not have any prior reported suicide attempts. He confirms patient was being titrated off effexor and has had increased to remeron, zyprexa, Viibryd. He agrees with decision that patient would benefit from voluntary inpatient psychiatric admission.     Confirmed most recent medications from patient's pharmacy [facility] [phone]. Patient last on zyprexa 20mg qhs (picked up 6/11), remeron 60mg qhs, viibyrd 40mg qd, klonopin 1mg BID, fluoxetine 10mg (6/12). Other medications include coreg 3.125mg BID, atorvastatin 10mg qd, plavix 75mg qd, entresto 24-26mg qd (confirmed NOT BID), synthroid 25 mcg qd.

## 2023-07-03 NOTE — BH PATIENT PROFILE - NSSUBRIEFINTERVENTION_PSY_A_CORE
Drinking or using substances at the unhealthy identified increases the risk of alcohol or substance abuse related health problems.

## 2023-07-04 DIAGNOSIS — F40.00 AGORAPHOBIA, UNSPECIFIED: ICD-10-CM

## 2023-07-04 LAB
ALBUMIN SERPL ELPH-MCNC: 3.7 G/DL — SIGNIFICANT CHANGE UP (ref 3.3–5)
ALP SERPL-CCNC: 54 U/L — SIGNIFICANT CHANGE UP (ref 40–120)
ALT FLD-CCNC: 12 U/L — SIGNIFICANT CHANGE UP (ref 10–45)
ANION GAP SERPL CALC-SCNC: 10 MMOL/L — SIGNIFICANT CHANGE UP (ref 5–17)
AST SERPL-CCNC: 18 U/L — SIGNIFICANT CHANGE UP (ref 10–40)
BASOPHILS # BLD AUTO: 0.02 K/UL — SIGNIFICANT CHANGE UP (ref 0–0.2)
BASOPHILS NFR BLD AUTO: 0.2 % — SIGNIFICANT CHANGE UP (ref 0–2)
BILIRUB SERPL-MCNC: 0.3 MG/DL — SIGNIFICANT CHANGE UP (ref 0.2–1.2)
BUN SERPL-MCNC: 17 MG/DL — SIGNIFICANT CHANGE UP (ref 7–23)
CALCIUM SERPL-MCNC: 9.2 MG/DL — SIGNIFICANT CHANGE UP (ref 8.4–10.5)
CHLORIDE SERPL-SCNC: 106 MMOL/L — SIGNIFICANT CHANGE UP (ref 96–108)
CHOLEST SERPL-MCNC: 124 MG/DL — SIGNIFICANT CHANGE UP
CO2 SERPL-SCNC: 24 MMOL/L — SIGNIFICANT CHANGE UP (ref 22–31)
CREAT SERPL-MCNC: 0.66 MG/DL — SIGNIFICANT CHANGE UP (ref 0.5–1.3)
EGFR: 91 ML/MIN/1.73M2 — SIGNIFICANT CHANGE UP
EOSINOPHIL # BLD AUTO: 0.17 K/UL — SIGNIFICANT CHANGE UP (ref 0–0.5)
EOSINOPHIL NFR BLD AUTO: 1.9 % — SIGNIFICANT CHANGE UP (ref 0–6)
GLUCOSE SERPL-MCNC: 138 MG/DL — HIGH (ref 70–99)
HCT VFR BLD CALC: 33.3 % — LOW (ref 34.5–45)
HDLC SERPL-MCNC: 61 MG/DL — SIGNIFICANT CHANGE UP
HGB BLD-MCNC: 10.8 G/DL — LOW (ref 11.5–15.5)
IMM GRANULOCYTES NFR BLD AUTO: 0.3 % — SIGNIFICANT CHANGE UP (ref 0–0.9)
LIPID PNL WITH DIRECT LDL SERPL: 45 MG/DL — SIGNIFICANT CHANGE UP
LYMPHOCYTES # BLD AUTO: 0.45 K/UL — LOW (ref 1–3.3)
LYMPHOCYTES # BLD AUTO: 4.9 % — LOW (ref 13–44)
MAGNESIUM SERPL-MCNC: 1.9 MG/DL — SIGNIFICANT CHANGE UP (ref 1.6–2.6)
MCHC RBC-ENTMCNC: 32 PG — SIGNIFICANT CHANGE UP (ref 27–34)
MCHC RBC-ENTMCNC: 32.4 GM/DL — SIGNIFICANT CHANGE UP (ref 32–36)
MCV RBC AUTO: 98.8 FL — SIGNIFICANT CHANGE UP (ref 80–100)
MONOCYTES # BLD AUTO: 0.76 K/UL — SIGNIFICANT CHANGE UP (ref 0–0.9)
MONOCYTES NFR BLD AUTO: 8.3 % — SIGNIFICANT CHANGE UP (ref 2–14)
NEUTROPHILS # BLD AUTO: 7.71 K/UL — HIGH (ref 1.8–7.4)
NEUTROPHILS NFR BLD AUTO: 84.4 % — HIGH (ref 43–77)
NON HDL CHOLESTEROL: 63 MG/DL — SIGNIFICANT CHANGE UP
NRBC # BLD: 0 /100 WBCS — SIGNIFICANT CHANGE UP (ref 0–0)
PHOSPHATE SERPL-MCNC: 2.6 MG/DL — SIGNIFICANT CHANGE UP (ref 2.5–4.5)
PLATELET # BLD AUTO: 214 K/UL — SIGNIFICANT CHANGE UP (ref 150–400)
POTASSIUM SERPL-MCNC: 4.1 MMOL/L — SIGNIFICANT CHANGE UP (ref 3.5–5.3)
POTASSIUM SERPL-SCNC: 4.1 MMOL/L — SIGNIFICANT CHANGE UP (ref 3.5–5.3)
PROT SERPL-MCNC: 7 G/DL — SIGNIFICANT CHANGE UP (ref 6–8.3)
RBC # BLD: 3.37 M/UL — LOW (ref 3.8–5.2)
RBC # FLD: 12.8 % — SIGNIFICANT CHANGE UP (ref 10.3–14.5)
SODIUM SERPL-SCNC: 140 MMOL/L — SIGNIFICANT CHANGE UP (ref 135–145)
TRIGL SERPL-MCNC: 90 MG/DL — SIGNIFICANT CHANGE UP
WBC # BLD: 9.14 K/UL — SIGNIFICANT CHANGE UP (ref 3.8–10.5)
WBC # FLD AUTO: 9.14 K/UL — SIGNIFICANT CHANGE UP (ref 3.8–10.5)

## 2023-07-04 PROCEDURE — 70450 CT HEAD/BRAIN W/O DYE: CPT | Mod: MA

## 2023-07-04 PROCEDURE — 97161 PT EVAL LOW COMPLEX 20 MIN: CPT

## 2023-07-04 PROCEDURE — 84443 ASSAY THYROID STIM HORMONE: CPT

## 2023-07-04 PROCEDURE — 80053 COMPREHEN METABOLIC PANEL: CPT

## 2023-07-04 PROCEDURE — 36415 COLL VENOUS BLD VENIPUNCTURE: CPT

## 2023-07-04 PROCEDURE — 92610 EVALUATE SWALLOWING FUNCTION: CPT

## 2023-07-04 PROCEDURE — 93005 ELECTROCARDIOGRAM TRACING: CPT

## 2023-07-04 PROCEDURE — 99285 EMERGENCY DEPT VISIT HI MDM: CPT

## 2023-07-04 PROCEDURE — 82962 GLUCOSE BLOOD TEST: CPT

## 2023-07-04 PROCEDURE — 0225U NFCT DS DNA&RNA 21 SARSCOV2: CPT

## 2023-07-04 PROCEDURE — 86900 BLOOD TYPING SEROLOGIC ABO: CPT

## 2023-07-04 PROCEDURE — 36000 PLACE NEEDLE IN VEIN: CPT

## 2023-07-04 PROCEDURE — 85730 THROMBOPLASTIN TIME PARTIAL: CPT

## 2023-07-04 PROCEDURE — 81003 URINALYSIS AUTO W/O SCOPE: CPT

## 2023-07-04 PROCEDURE — 84132 ASSAY OF SERUM POTASSIUM: CPT

## 2023-07-04 PROCEDURE — 87040 BLOOD CULTURE FOR BACTERIA: CPT

## 2023-07-04 PROCEDURE — 86803 HEPATITIS C AB TEST: CPT

## 2023-07-04 PROCEDURE — 82803 BLOOD GASES ANY COMBINATION: CPT

## 2023-07-04 PROCEDURE — 82330 ASSAY OF CALCIUM: CPT

## 2023-07-04 PROCEDURE — 86850 RBC ANTIBODY SCREEN: CPT

## 2023-07-04 PROCEDURE — 85025 COMPLETE CBC W/AUTO DIFF WBC: CPT

## 2023-07-04 PROCEDURE — 84295 ASSAY OF SERUM SODIUM: CPT

## 2023-07-04 PROCEDURE — 84484 ASSAY OF TROPONIN QUANT: CPT

## 2023-07-04 PROCEDURE — 80307 DRUG TEST PRSMV CHEM ANLYZR: CPT

## 2023-07-04 PROCEDURE — 82550 ASSAY OF CK (CPK): CPT

## 2023-07-04 PROCEDURE — 85610 PROTHROMBIN TIME: CPT

## 2023-07-04 PROCEDURE — 86901 BLOOD TYPING SEROLOGIC RH(D): CPT

## 2023-07-04 PROCEDURE — 83605 ASSAY OF LACTIC ACID: CPT

## 2023-07-04 PROCEDURE — 84100 ASSAY OF PHOSPHORUS: CPT

## 2023-07-04 PROCEDURE — 71045 X-RAY EXAM CHEST 1 VIEW: CPT

## 2023-07-04 PROCEDURE — 83735 ASSAY OF MAGNESIUM: CPT

## 2023-07-04 PROCEDURE — 99222 1ST HOSP IP/OBS MODERATE 55: CPT

## 2023-07-04 RX ORDER — OLANZAPINE 15 MG/1
2.5 TABLET, FILM COATED ORAL ONCE
Refills: 0 | Status: COMPLETED | OUTPATIENT
Start: 2023-07-04 | End: 2023-07-04

## 2023-07-04 RX ORDER — POLYETHYLENE GLYCOL 3350 17 G/17G
17 POWDER, FOR SOLUTION ORAL AT BEDTIME
Refills: 0 | Status: DISCONTINUED | OUTPATIENT
Start: 2023-07-04 | End: 2023-07-17

## 2023-07-04 RX ADMIN — CARVEDILOL PHOSPHATE 3.12 MILLIGRAM(S): 80 CAPSULE, EXTENDED RELEASE ORAL at 09:29

## 2023-07-04 RX ADMIN — MIRTAZAPINE 7.5 MILLIGRAM(S): 45 TABLET, ORALLY DISINTEGRATING ORAL at 21:37

## 2023-07-04 RX ADMIN — Medication 25 MICROGRAM(S): at 06:45

## 2023-07-04 RX ADMIN — HALOPERIDOL DECANOATE 2 MILLIGRAM(S): 100 INJECTION INTRAMUSCULAR at 16:54

## 2023-07-04 RX ADMIN — Medication 2000 UNIT(S): at 09:28

## 2023-07-04 RX ADMIN — CLOPIDOGREL BISULFATE 75 MILLIGRAM(S): 75 TABLET, FILM COATED ORAL at 09:29

## 2023-07-04 RX ADMIN — ATORVASTATIN CALCIUM 10 MILLIGRAM(S): 80 TABLET, FILM COATED ORAL at 21:36

## 2023-07-04 RX ADMIN — Medication 3 MILLIGRAM(S): at 21:36

## 2023-07-04 RX ADMIN — OLANZAPINE 2.5 MILLIGRAM(S): 15 TABLET, FILM COATED ORAL at 22:21

## 2023-07-04 RX ADMIN — CARVEDILOL PHOSPHATE 3.12 MILLIGRAM(S): 80 CAPSULE, EXTENDED RELEASE ORAL at 21:37

## 2023-07-04 RX ADMIN — SACUBITRIL AND VALSARTAN 1 TABLET(S): 24; 26 TABLET, FILM COATED ORAL at 09:29

## 2023-07-04 NOTE — BH INPATIENT PSYCHIATRY ASSESSMENT NOTE - NSBHASSESSSUMMFT_PSY_ALL_CORE
76 yo  woman with PMH of CAD s/p MI (Sept 2022), PPH depression, anorexia, one inpatient psych hospitalization, that present with altered mental status (found down by family) with concern for ingestion of excessive medication as EMS found empty bottles by the bedside. Psychiatry consulted for evaluation of depression and suicidality.    78 y/o woman with PPH of MDD, agoraphobia, ?anorexia (pt denies prior diagnosis), past psychiatric admissions for depression, no history of self harm and PMH of MI (Sept 2022) who presented with altered mental status after being found lethargic at home by EMS with empty bottles of medications near to hear, with the initial suspicion of OD. On the initial assessment and later re-assessment pt denied having a SA or any history of self harm. She admitted worsening depression of > 1 year and recent updates in her treatment (currently prescribed mirtazapine 60mg, Viibryd, olanzapine 20mg qhs, clonazepam 0.5-1mg prn). Pt reports feeling overwhelmed with taking so many psychotropics which could have led to unintentionally mix her medications. Pt endorses low mood, anhedonia, worsening agoraphobia, poor appetite with weight loss (denies intentional weight loss), hopelessness.  Collateral from family members confirm that pt hasn't expressed SI or had any suicidal gestures. Family is concerned about pt's worsening depression (with significant weight loss for after her MI dg) and pt's current outpatient mental health follow up (pt sees a [facility] licensed psychiatrist who has been updating her medications). Pt is currently requesting a psychiatric admission to be titrated on different psychotropics and referred for outpatient followup after discharge. Pt's outpt psychiatrist is also advocating for a psychiatric admission. dx resolved delirium, MDD recurrent, severe, r/o agoraphobia r/o anorexia, r/o cluster B PD (per collateral from outpatient psychiatrist) r/o cognitive d/o .Assessment: Pt is a 78 y/o woman with PMH of MI and hx of depression, agoraphobia, ?anorexia, ?cluster B PD, presenting to [facility] with AMS s/p ingestion of multiple psychotropics, with low suspicion at this point of intentional OD but concern of polypharmacy, r/o delirium (?anticholinergic). Pt and family members report worsening depression of several months with poor response to outpatient treatment.    Upon assessment today, patient continues to deny that she intentionally overdosed on medications in an attempt to end her life, but does endorse worsening symptoms do depression and anhedonia. Patient's family & outpatient psychiatrist, as well as patient herself, describe history of worsening depression/anxiety to the point of complete isolation, debilitating depression and anxiety that cause significant distress, hopelessness, and weight loss. Patient is agreeable to voluntary psychiatric admission. Patient would benefit from inpatient psychiatric hospitalization after medical clearance for acute stabilization, medication management, aftercare planning, diagnostic clarity.    PLAN    1. Continue mirtazapine (Remeron) 7.5 mg PO qhs  2. Hold home clonazepam (Klonopin) 1 mg PO BID for insomnia given associated risk of benzodiazapine for risk of falls and oversedation due to patient's age  3 CIWA monitoring for benzodiazepine withdrawal; can consider restarting clonazepam (Klonopin) 0.5 mg PO q12h PRN for benzodiazepine withdrawal with plan to taper; will continue to monitor  4.  Hold home Vilazodone (Viibryd) and Olanzapine (Zyprexa) given unclear benefit   5.  PRNs: halopeidol (Haldol) 2 mg PO/IM q6h PRN for agitation  4. No need for 1:1 observation     77 year old female, , retired, domiciled alone with PMH HTN, HLD, CAD, history of MI (September 2022) and PPH depression, anxiety, agoraphobia, anorexia nervosa (patient denies diagnosis), Cluster B Personality Traits, history of one prior inpatient psychiatric hospitalization at [facility] in 2016 for depression, currently engaged in outpatient psychiatric treatment with a [facility] licensed psychiatrist, no history of SA/NSSIB/Violence, no history of trauma, who presented with AMS after being found lethargic at home by EMS with empty bottles of medications and initial suspicion of intentional overdose, although the patient adamantly denies it was a suicide attempt.  The patient was assessed and followed by  Psychiatry who determined that overdose was likely unintentional and secondary to recent medication changes and polypharmacy.      On evaluation, the patient is calm, cooperative, polite and pleasant with good eye contact, well-related, euthymic affect and demonstrating good behavioral control and linear thought processes.  The patient reports daily low mood, anhedonia, worsening agoraphobia, poor appetite with weight loss (denies intentional weight loss) and hopelessness over the past several months.  She adamantly denies her overdose on home medications was intentional but, instead, the result of polypharmacy.  Collateral obtained from family members and outpatient psychiatrist confirm that the patient has not expressed SI or had any suicidal gestures. However, the patient's family and outpatient psychiatrist report concern for the patient's worsening depression (with significant weight loss for after her MI in September 2022).  The patient is agreeable to a voluntary inpatient psychiatric hospitalization for medication optimization and establishment of outpatient follow-up.  The patient's presentation is consistent with MDD.  Anorexia nervosa should be ruled out.      PLAN  1. Voluntary Admission to [facility]  2. No psychiatric indication for CO 1:1 observation  3. Continue mirtazapine (Remeron) 7.5 mg PO qhs  4. Hold home clonazepam (Klonopin) 1 mg PO BID for insomnia given associated risk of benzodiazapine for risk of falls and oversedation due to patient's age  5. CIWA monitoring for benzodiazepine withdrawal; can consider restarting clonazepam (Klonopin) 0.5 mg PO q12h PRN for benzodiazepine withdrawal with plan to taper; will continue to monitor  6.  Hold home Vilazodone (Viibryd) and Olanzapine (Zyprexa) given unclear benefit   7.  PRNs: haloperidol (Haldol) 2 mg PO/IM q6h PRN for agitation

## 2023-07-04 NOTE — BH INPATIENT PSYCHIATRY ASSESSMENT NOTE - NSCOMMENTSUICRISKFACT_PSY_ALL_CORE
The patient is at a chronically increased risk for self-harm/suicide given an history of depression, elderly, Cluster B Personality Traits which is acutely elevated given worsening of depressive symptoms and concern for potential intentional overdose.

## 2023-07-04 NOTE — BH INPATIENT PSYCHIATRY ASSESSMENT NOTE - NSBHPHYSICALEXAM_PSY_ALL_CORE
Appearance & Skin: elderly female in NAD, skin warm, dry, no rashes  Head & Neck; ENT: head normocephalic/atraumatic, EOMI, sclera anicteric, no conjunctival injection bilaterally, mucous membranes moist, nares patent  Chest: CTAB, no wheezes, rales, rhonchi, no increased work of breathing  Cardiac: regular rate and rhythm, normal S1, S2, no murmurs, rubs or gallops  Abdomen: soft, non-tender, non-distended  Neurological: AOx3, moving all four extremities against gravity  Extremities: no peripheral cyanosis or edema bilaterally

## 2023-07-04 NOTE — BH INPATIENT PSYCHIATRY ASSESSMENT NOTE - CURRENT MEDICATION
MEDICATIONS  (STANDING):  atorvastatin 10 milliGRAM(s) Oral at bedtime  carvedilol 3.125 milliGRAM(s) Oral every 12 hours  cholecalciferol 2000 Unit(s) Oral daily  clopidogrel Tablet 75 milliGRAM(s) Oral daily  levothyroxine 25 MICROGram(s) Oral daily  melatonin 3 milliGRAM(s) Oral at bedtime  mirtazapine 7.5 milliGRAM(s) Oral at bedtime  sacubitril 24 mG/valsartan 26 mG 1 Tablet(s) Oral <User Schedule>    MEDICATIONS  (PRN):  aluminum hydroxide/magnesium hydroxide/simethicone Suspension 30 milliLiter(s) Oral every 4 hours PRN Dyspepsia  haloperidol     Tablet 2 milliGRAM(s) Oral every 6 hours PRN agitation

## 2023-07-04 NOTE — BH INPATIENT PSYCHIATRY ASSESSMENT NOTE - NSBHCHARTREVIEWLAB_PSY_A_CORE FT
CBC:            10.8   9.14  )-----------( 214      ( 07-04-23 @ 07:55 )             33.3     Chem:         ( 07-04-23 @ 07:55 )  140  |  106  |  17  ----------------------------<  138<H>  4.1   |  24  |  0.66    Liver Functions: ( 07-04-23 @ 07:55 )  Alb: 3.7 g/dL / Pro: 7.0 g/dL / ALK PHOS: 54 U/L / ALT: 12 U/L / AST: 18 U/L / GGT: x            ABO/Rh/Jesse:  O Positive

## 2023-07-04 NOTE — BH INPATIENT PSYCHIATRY ASSESSMENT NOTE - NSBHCHARTREVIEWINVESTIGATE_PSY_A_CORE FT
ECG (07/02/23):    Ventricular Rate 102 BPM    Atrial Rate 102 BPM    P-R Interval 148 ms    QRS Duration 72 ms    Q-T Interval 352 ms    QTC Calculation(Bazett) 458 ms    P Axis 83 degrees    R Axis 68 degrees    T Axis 69 degrees    Diagnosis Line Sinus tachycardia    Confirmed by Sinai Sosa ([id number]) on 7/3/2023 5:16:40 PM

## 2023-07-04 NOTE — BH INPATIENT PSYCHIATRY ASSESSMENT NOTE - OTHER PAST PSYCHIATRIC HISTORY (INCLUDE DETAILS REGARDING ONSET, COURSE OF ILLNESS, INPATIENT/OUTPATIENT TREATMENT)
reports history of one inpatient psych hospitalization around 7 years ago in context of depressive symptoms; no known history of suicide attempts; history of SSRI tx reports history of one inpatient psych hospitalization in 2016 in the setting of depressive symptoms; no known history of suicide attempts/NSSIB; history of SSRI tx

## 2023-07-04 NOTE — BH INPATIENT PSYCHIATRY ASSESSMENT NOTE - NSBHCHARTREVIEWVS_PSY_A_CORE FT
Vital Signs Last 24 Hrs  T(C): 37 (07-04-23 @ 06:54), Max: 37.1 (07-03-23 @ 12:38)  T(F): 98.6 (07-04-23 @ 06:54), Max: 98.7 (07-03-23 @ 12:38)  HR: 101 (07-04-23 @ 06:54) (87 - 101)  BP: 135/76 (07-04-23 @ 06:54) (105/66 - 148/72)  BP(mean): --  RR: 16 (07-04-23 @ 06:54) (16 - 19)  SpO2: 96% (07-04-23 @ 06:54) (96% - 97%)    Orthostatic VS  07-02-23 @ 15:17  Lying BP: 116/70 HR: 98  Sitting BP: 112/69 HR: 99  Standing BP: 120/74 HR: 90  Site: upper right arm  Mode: electronic   Vital Signs Last 24 Hrs  T(C): 36.8 (07-04-23 @ 09:55), Max: 37 (07-04-23 @ 06:54)  T(F): 98.3 (07-04-23 @ 09:55), Max: 98.6 (07-04-23 @ 06:54)  HR: 110 (07-04-23 @ 09:55) (87 - 110)  BP: 148/81 (07-04-23 @ 09:55) (130/68 - 148/81)  BP(mean): --  RR: 17 (07-04-23 @ 09:55) (16 - 19)  SpO2: 97% (07-04-23 @ 09:55) (96% - 97%)    Orthostatic VS  07-02-23 @ 15:17  Lying BP: 116/70 HR: 98  Sitting BP: 112/69 HR: 99  Standing BP: 120/74 HR: 90  Site: upper right arm  Mode: electronic   Vital Signs Last 24 Hrs  T(C): 36.8 (07-04-23 @ 09:55), Max: 37 (07-04-23 @ 06:54)  T(F): 98.3 (07-04-23 @ 09:55), Max: 98.6 (07-04-23 @ 06:54)  HR: 110 (07-04-23 @ 09:55) (87 - 110)  BP: 148/81 (07-04-23 @ 09:55) (130/68 - 148/81)  BP(mean): --  RR: 17 (07-04-23 @ 09:55) (16 - 19)  SpO2: 97% (07-04-23 @ 09:55) (96% - 97%)

## 2023-07-04 NOTE — BH INPATIENT PSYCHIATRY ASSESSMENT NOTE - RISK ASSESSMENT
see above  The patient is at a chronically increased risk for self-harm/suicide given an history of depression, elderly, Cluster B Personality Traits which is acutely elevated given worsening of depressive symptoms and concern for potential intentional overdose (patient adamantly denies it was an overdose).  Protective factors that mitigate this risk include a capacity to advocate for self, good social support, engaged in treatment, future-oriented, denies SI, euthymic affect, no signs/symptoms of psychosis/ana maria, and no history of suicide attempts or NSSIB.

## 2023-07-04 NOTE — BH INPATIENT PSYCHIATRY ASSESSMENT NOTE - NSBHMEDSOTHERFT_PSY_A_CORE
olanzapine (Zyprexa) unknown dose  Vilazodone (Viibryd) unknown dose  senna leaf extract oral tablet , 2 tab(s) orally once a day (at bedtime)  polyethylene glycol 3350 oral powder for reconstitution , 17 gram(s) orally every 12 hours  Vitamin D3 50 mcg (2000 intl units) oral capsule , 1 orally once a day  KlonoPIN 0.5 mg oral tablet , 0.5 tab(s) orally once a day (at bedtime) as needed for  anxiety max dose of 1 per day  levothyroxine 25 mcg (0.025 mg) oral capsule , 1 orally once a day  Entresto 24 mg-26 mg oral tablet , 1 orally 2 times a day  clopidogrel 75 mg oral tablet , 1 orally once a day  atorvastatin 10 mg oral tablet , 1 orally once a day  carvedilol 3.125 mg oral tablet , 1 orally 2 times a day  mirtazapine 30 mg oral tablet , 2 tab(s) orally once a day (at bedtime)

## 2023-07-04 NOTE — BH INPATIENT PSYCHIATRY ASSESSMENT NOTE - NSDCCRITERIA_PSY_ALL_CORE
Improvement in depressive symptoms, optimization of medication, establishment of outpatient follow-up

## 2023-07-04 NOTE — BH INPATIENT PSYCHIATRY ASSESSMENT NOTE - HPI (INCLUDE ILLNESS QUALITY, SEVERITY, DURATION, TIMING, CONTEXT, MODIFYING FACTORS, ASSOCIATED SIGNS AND SYMPTOMS)
77 year old female with history of MDD, agoraphobia, ? anorexia, prior inpt psych admissions, no hx of self harm, admitted with AMS, after an OD, likely unintentional in context of recent medication changes with resulting polypharmacy. Legals, handoff, orders- done.  Pt was medically cleared in am, but prior to transfer she was found to retain some urine; she will have another voiding trial at 6pm and will be transferred… or not based on the results.   77 year old female, with PMH of MI (Sept 2022), past psychiatric history of depression, anorexia, one inpatient psych hospitalization, that present with altered mental status (found down by family) with concern for ingestion of excessive medication as EMS found empty bottles by the bedside.         77 year old female with history of MDD, agoraphobia, ? anorexia, prior inpt psych admissions, no hx of self harm, admitted with AMS, after an OD, likely unintentional in context of recent medication changes with resulting polypharmacy. Legals, handoff, orders- done.  Pt was medically cleared in am, but prior to transfer she was found to retain some urine; she will have another voiding trial at 6pm and will be transferred… or not based on the results.        Per CL Assessment Note written by Dr. Raymond Arevalo on 07/02/23:  "Per collateral from the patient's son & son's wife (pt's daughter-in-law): Reports that patient has been experiencing severe & debilitating depression and anxiety starting around the time of her MI one year ago, getting worse & more impairing starting December of 2022 (pt has longstanding history prior to that though, with inpatient psych hospitalization >5 years ago & history of SSRI tx years ago). State that patient has become increasingly isolative and barely leaves the home now. Give example of patient being unable to attend beloved granddaughter's graduation ceremony due to depression/isolative behavior, even though the ceremony was closeby. Report that patient frequently makes statements expressing how depressed and hopeless she feels. Also report that patient exhibits generalized and debilitating levels of anxiety about a wide array of triggers (such as phone calls). They are unsure whether what occurred last night was a suicide attempt; state that it's possible that patient may have forgotten that she had taken her meds or been confused, and then taken more meds. Deny any known suicide attempts in the past. Deny having heard patient make any suicidal statements in the past. Report that part of the patient's pattern of anxiety is frequently contacting her psychiatrist and endorsing distress, which has resulted in frequent medication changes. They state that the patient's outpatient psychiatrist has also stated concern that patient require inpatient care (even prior to the current ingestion event) and has also brought up the potential for ECT given the severity of the patient's symptoms. They also express concern about the patient's low weight and state that she barely eats; are unable to provide further detail regarding the patient's food intake as patient lives alone.     Patient's psychiatrist is Dr. Briones [phone]  Patient's therapist (as of two weeks ago) is Gabriel [phone]" 77 year old female with PMH HTN, HLD, CAD, history of MI (September 2022) and PPH depression, anxiety, agoraphobia, anorexia nervosa (patient denies diagnosis), Cluster B Personality Traits, history of one prior inpatient psychiatric hospitalization at [facility] in 2016 for depression, currently engaged in outpatient psychiatric treatment with a [facility] licensed psychiatrist, no history of SA/NSSIB/Violence, no history of trauma, who presented with AMS after being found lethargic at home by EMS with empty bottles of medications and initial suspicion of intentional overdose, although the patient adamantly denies it was a suicide attempt.  The patient was assessed and followed by  Psychiatry who determined that overdose was likely unintentional and secondary to recent medication changes and polypharmacy.      On evaluation, the patient is calm, cooperative, polite and pleasant with good eye contact, well-related, euthymic affect and demonstrating good behavioral control and linear thought processes.        She admitted worsening depression of > 1 year and recent updates in her treatment (currently prescribed mirtazapine 60mg, Viibryd, olanzapine 20mg qhs, clonazepam 0.5-1mg prn). Pt reports feeling overwhelmed with taking so many psychotropics which could have led to unintentionally mix her medications. Pt endorses low mood, anhedonia, worsening agoraphobia, poor appetite with weight loss (denies intentional weight loss), hopelessness.  Collateral from family members confirm that pt hasn't expressed SI or had any suicidal gestures. Family is concerned about pt's worsening depression (with significant weight loss for after her MI dg) and pt's current outpatient mental health follow up (pt sees a [facility] licensed psychiatrist who has been updating her medications). Pt is currently requesting a psychiatric admission to be titrated on different psychotropics and referred for outpatient followup after discharge. Pt's outpt psychiatrist is also advocating for a psychiatric admission. dx resolved delirium, MDD recurrent, severe, r/o agoraphobia r/o anorexia, r/o cluster B PD (per collateral from outpatient psychiatrist) r/o cognitive d/o .    Assessment: Pt is a 78 y/o woman with PMH of MI and hx of depression, agoraphobia, ?anorexia, ?cluster B PD, presenting to [facility] with AMS s/p ingestion of multiple psychotropics, with low suspicion at this point of intentional OD but concern of polypharmacy, r/o delirium (?anticholinergic). Pt and family members report worsening depression of several months with poor response to outpatient treatment.            77 year old female, with PMH of MI (Sept 2022), past psychiatric history of depression, anorexia, one inpatient psych hospitalization, that present with altered mental status (found down by family) with concern for ingestion of excessive medication as EMS found empty bottles by the bedside.         77 year old female with history of MDD, agoraphobia, ? anorexia, prior inpt psych admissions, no hx of self harm, admitted with AMS, after an OD, likely unintentional in context of recent medication changes with resulting polypharmacy. Legals, handoff, orders- done.        Per CL Assessment Note written by Dr. Raymond Arevalo on 07/02/23:  "Per collateral from the patient's son & son's wife (pt's daughter-in-law): Reports that patient has been experiencing severe & debilitating depression and anxiety starting around the time of her MI one year ago, getting worse & more impairing starting December of 2022 (pt has longstanding history prior to that though, with inpatient psych hospitalization >5 years ago & history of SSRI tx years ago). State that patient has become increasingly isolative and barely leaves the home now. Give example of patient being unable to attend beloved granddaughter's graduation ceremony due to depression/isolative behavior, even though the ceremony was closeby. Report that patient frequently makes statements expressing how depressed and hopeless she feels. Also report that patient exhibits generalized and debilitating levels of anxiety about a wide array of triggers (such as phone calls). They are unsure whether what occurred last night was a suicide attempt; state that it's possible that patient may have forgotten that she had taken her meds or been confused, and then taken more meds. Deny any known suicide attempts in the past. Deny having heard patient make any suicidal statements in the past. Report that part of the patient's pattern of anxiety is frequently contacting her psychiatrist and endorsing distress, which has resulted in frequent medication changes. They state that the patient's outpatient psychiatrist has also stated concern that patient require inpatient care (even prior to the current ingestion event) and has also brought up the potential for ECT given the severity of the patient's symptoms. They also express concern about the patient's low weight and state that she barely eats; are unable to provide further detail regarding the patient's food intake as patient lives alone.     Patient's psychiatrist is Dr. Briones [phone]  Patient's therapist (as of two weeks ago) is Gabriel [phone]" 77 year old female with PMH HTN, HLD, CAD, history of MI (September 2022) and PPH depression, anxiety, agoraphobia, anorexia nervosa (patient denies diagnosis), Cluster B Personality Traits, history of one prior inpatient psychiatric hospitalization at [facility] in 2016 for depression, currently engaged in outpatient psychiatric treatment with a [facility] licensed psychiatrist, no history of SA/NSSIB/Violence, no history of trauma, who presented with AMS after being found lethargic at home by EMS with empty bottles of medications and initial suspicion of intentional overdose, although the patient adamantly denies it was a suicide attempt.  The patient was assessed and followed by  Psychiatry who determined that overdose was likely unintentional and secondary to recent medication changes and polypharmacy.      On evaluation, the patient is calm, cooperative, polite and pleasant with good eye contact, well-related, euthymic affect and demonstrating good behavioral control and linear thought processes.  The patient reports that she was admitted to [facility] to "have [her] medications straightened out."  She states that, unbeknownst to her, she was found "passed out" by EMS after her son and best friend activated 911 when they could not get in contact with her.  She states that she "may have taken an extra Klonopin" and that she "screwed up terribly."  The patient adamantly denies that it was a suicide attempt.  The patient reports worsening depressive symptoms over the past several months secondary to the mourning of her 's death thirteen years ago and feelings of loneliness given that "all of [her] friends are ."  She reports daily low mood, anhedonia, worsening agoraphobia, poor appetite with associated weight loss since her MI in September 2022 (denies intentional weight loss) and hopelessness.  The patient reports that her energy and attention/concentration are okay and she adamantly denies SI/HI, intent, plan and AVH.  No paranoia or delusions are reported or elicited.  She states that she is currently in psychiatric treatment with a psychiatrist in [place] who prescribes her mirtazapine 60 mg PO qHs, Viibryd, Olanzapine 20 mg PO qHs and clonazepam (Klonopin) 0.5 - 1 mg PO daily PRN.  She states that she feels overwhelmed by polypharmacy and states that she likely unintentionally overdosed on her prescribed medications.  The patient states that she is agreeable to her current inpatient psychiatric hospitalization.  The patient adamantly denies SI/HI, intent and plan.  All questions and concerns addressed.      Per CL Assessment Note written by Dr. Raymond Arevalo on 07/02/23:  "Per collateral from the patient's son & son's wife (pt's daughter-in-law): Reports that patient has been experiencing severe & debilitating depression and anxiety starting around the time of her MI one year ago, getting worse & more impairing starting December of 2022 (pt has longstanding history prior to that though, with inpatient psych hospitalization >5 years ago & history of SSRI tx years ago). State that patient has become increasingly isolative and barely leaves the home now. Give example of patient being unable to attend beloved granddaughter's graduation ceremony due to depression/isolative behavior, even though the ceremony was closeby. Report that patient frequently makes statements expressing how depressed and hopeless she feels. Also report that patient exhibits generalized and debilitating levels of anxiety about a wide array of triggers (such as phone calls). They are unsure whether what occurred last night was a suicide attempt; state that it's possible that patient may have forgotten that she had taken her meds or been confused, and then taken more meds. Deny any known suicide attempts in the past. Deny having heard patient make any suicidal statements in the past. Report that part of the patient's pattern of anxiety is frequently contacting her psychiatrist and endorsing distress, which has resulted in frequent medication changes. They state that the patient's outpatient psychiatrist has also stated concern that patient require inpatient care (even prior to the current ingestion event) and has also brought up the potential for ECT given the severity of the patient's symptoms. They also express concern about the patient's low weight and state that she barely eats; are unable to provide further detail regarding the patient's food intake as patient lives alone.     Patient's psychiatrist is Dr. Briones [phone]  Patient's therapist (as of two weeks ago) is Gabriel [phone]" 77 year old female, , retired, domiciled alone with PMH HTN, HLD, CAD, history of MI (September 2022) and PPH depression, anxiety, agoraphobia, anorexia nervosa (patient denies diagnosis), Cluster B Personality Traits, history of one prior inpatient psychiatric hospitalization at [facility] in 2016 for depression, currently engaged in outpatient psychiatric treatment with a [facility] licensed psychiatrist, no history of SA/NSSIB/Violence, no history of trauma, who presented with AMS after being found lethargic at home by EMS with empty bottles of medications and initial suspicion of intentional overdose, although the patient adamantly denies it was a suicide attempt.  The patient was assessed and followed by  Psychiatry who determined that overdose was likely unintentional and secondary to recent medication changes and polypharmacy.      On evaluation, the patient is calm, cooperative, polite and pleasant with good eye contact, well-related, euthymic affect and demonstrating good behavioral control and linear thought processes.  The patient reports that she was admitted to [facility] to "have [her] medications straightened out."  She states that, unbeknownst to her, she was found "passed out" by EMS after her son and best friend activated 911 when they could not get in contact with her.  She states that she "may have taken an extra Klonopin" and that she "screwed up terribly."  The patient adamantly denies that it was a suicide attempt.  The patient reports worsening depressive symptoms over the past several months secondary to the mourning of her 's death thirteen years ago and feelings of loneliness given that "all of [her] friends are ."  She reports daily low mood, anhedonia, worsening agoraphobia, poor appetite with associated weight loss since her MI in September 2022 (denies intentional weight loss) and hopelessness.  The patient reports that her energy and attention/concentration are okay and she adamantly denies SI/HI, intent, plan and AVH.  No paranoia or delusions are reported or elicited.  She states that she is currently in psychiatric treatment with a psychiatrist in [place] who prescribes her mirtazapine 60 mg PO qHs, Viibryd, Olanzapine 20 mg PO qHs and clonazepam (Klonopin) 0.5 - 1 mg PO daily PRN.  She states that she feels overwhelmed by polypharmacy and states that she likely unintentionally overdosed on her prescribed medications.  The patient states that she is agreeable to her current inpatient psychiatric hospitalization.  The patient adamantly denies SI/HI, intent and plan.  All questions and concerns addressed.      Per CL Assessment Note written by Dr. Raymond Arevalo on 07/02/23:  "Per collateral from the patient's son & son's wife (pt's daughter-in-law): Reports that patient has been experiencing severe & debilitating depression and anxiety starting around the time of her MI one year ago, getting worse & more impairing starting December of 2022 (pt has longstanding history prior to that though, with inpatient psych hospitalization >5 years ago & history of SSRI tx years ago). State that patient has become increasingly isolative and barely leaves the home now. Give example of patient being unable to attend beloved granddaughter's graduation ceremony due to depression/isolative behavior, even though the ceremony was closeby. Report that patient frequently makes statements expressing how depressed and hopeless she feels. Also report that patient exhibits generalized and debilitating levels of anxiety about a wide array of triggers (such as phone calls). They are unsure whether what occurred last night was a suicide attempt; state that it's possible that patient may have forgotten that she had taken her meds or been confused, and then taken more meds. Deny any known suicide attempts in the past. Deny having heard patient make any suicidal statements in the past. Report that part of the patient's pattern of anxiety is frequently contacting her psychiatrist and endorsing distress, which has resulted in frequent medication changes. They state that the patient's outpatient psychiatrist has also stated concern that patient require inpatient care (even prior to the current ingestion event) and has also brought up the potential for ECT given the severity of the patient's symptoms. They also express concern about the patient's low weight and state that she barely eats; are unable to provide further detail regarding the patient's food intake as patient lives alone.     Patient's psychiatrist is Dr. Briones [phone]  Patient's therapist (as of two weeks ago) is Gabriel [phone]"

## 2023-07-04 NOTE — BH INPATIENT PSYCHIATRY ASSESSMENT NOTE - VIOLENCE PROTECTIVE FACTORS:
Residential stability/Sobriety Residential stability/Sobriety/Engagement in treatment/Affective Stability

## 2023-07-04 NOTE — BH INPATIENT PSYCHIATRY ASSESSMENT NOTE - NSSUICPROTFACT_PSY_ALL_CORE
Responsibility to children, family, or others/Identifies reasons for living/Supportive social network of family or friends Responsibility to children, family, or others/Identifies reasons for living/Supportive social network of family or friends/Cultural, spiritual and/or moral attitudes against suicide/Moravian beliefs

## 2023-07-04 NOTE — BH INPATIENT PSYCHIATRY ASSESSMENT NOTE - DESCRIPTION
lives alone; has supportive son who calls daily The patient reports that she was born and raised in [place] by her biological mother and father and she was an only child.  She is currently domiciled alone in a [place] apartment located on [address] [place] [address].  She states that she currently supports herself with SSI and an inheritance from her  .  She reports that she is  and that her  passed away 13 years ago.  She denies a history of  service.  She identifies as Religion.

## 2023-07-04 NOTE — BH INPATIENT PSYCHIATRY ASSESSMENT NOTE - NSBHMETABOLIC_PSY_ALL_CORE_FT
BMI: BMI (kg/m2): 15.3 (07-03-23 @ 20:44)  HbA1c:   Glucose: POCT Blood Glucose.: 114 mg/dL (07-01-23 @ 15:50)    BP: 135/76 (07-04-23 @ 06:54) (135/76 - 148/72)  Lipid Panel: Date/Time: 07-04-23 @ 06:48  Cholesterol, Serum: 124  Direct LDL: --  HDL Cholesterol, Serum: 61  Total Cholesterol/HDL Ration Measurement: --  Triglycerides, Serum: 90   BMI: BMI (kg/m2): 15.3 (07-03-23 @ 20:44)  HbA1c:   Glucose: POCT Blood Glucose.: 114 mg/dL (07-01-23 @ 15:50)    BP: 148/81 (07-04-23 @ 09:55) (135/76 - 148/81)  Lipid Panel: Date/Time: 07-04-23 @ 06:48  Cholesterol, Serum: 124  Direct LDL: --  HDL Cholesterol, Serum: 61  Total Cholesterol/HDL Ration Measurement: --  Triglycerides, Serum: 90

## 2023-07-04 NOTE — BH INPATIENT PSYCHIATRY ASSESSMENT NOTE - NSBHATTESTBILLING_PSY_A_CORE
99223-Initial OBS or IP - high complexity OR  mins 23394-[id number] diagnostic evaluation with medical services

## 2023-07-04 NOTE — BH INPATIENT PSYCHIATRY ASSESSMENT NOTE - DETAILS
pt in shared room and was guarded  The patient denies SI/HI, intent, plan and any history of SAs and NSSIB.

## 2023-07-05 PROCEDURE — 99233 SBSQ HOSP IP/OBS HIGH 50: CPT

## 2023-07-05 RX ADMIN — Medication 30 MILLILITER(S): at 05:46

## 2023-07-05 RX ADMIN — CARVEDILOL PHOSPHATE 3.12 MILLIGRAM(S): 80 CAPSULE, EXTENDED RELEASE ORAL at 09:28

## 2023-07-05 RX ADMIN — POLYETHYLENE GLYCOL 3350 17 GRAM(S): 17 POWDER, FOR SOLUTION ORAL at 21:06

## 2023-07-05 RX ADMIN — MIRTAZAPINE 7.5 MILLIGRAM(S): 45 TABLET, ORALLY DISINTEGRATING ORAL at 21:04

## 2023-07-05 RX ADMIN — Medication 2000 UNIT(S): at 09:28

## 2023-07-05 RX ADMIN — Medication 25 MICROGRAM(S): at 09:28

## 2023-07-05 RX ADMIN — ATORVASTATIN CALCIUM 10 MILLIGRAM(S): 80 TABLET, FILM COATED ORAL at 21:04

## 2023-07-05 RX ADMIN — CLOPIDOGREL BISULFATE 75 MILLIGRAM(S): 75 TABLET, FILM COATED ORAL at 09:28

## 2023-07-05 RX ADMIN — CARVEDILOL PHOSPHATE 3.12 MILLIGRAM(S): 80 CAPSULE, EXTENDED RELEASE ORAL at 21:05

## 2023-07-05 RX ADMIN — Medication 3 MILLIGRAM(S): at 21:04

## 2023-07-05 RX ADMIN — SACUBITRIL AND VALSARTAN 1 TABLET(S): 24; 26 TABLET, FILM COATED ORAL at 09:28

## 2023-07-05 NOTE — BH SOCIAL WORK INITIAL PSYCHOSOCIAL EVALUATION - NSBHEMPLOYEDYN_PSY_ALL_CORE
[facility]  [address] [id number] [phone]               Thank you for choosing us for your [facility] care visit with [facility]   We are glad to serve you and happy to provide you with this summary of yo Some people also find ice packs effective for relieving pain. Medicines  Your doctor may prescribe medications to help treat your sinusitis. If you have an infection, antibiotics can help clear it up.  If you are prescribed antibiotics, take all pills on s Sign up for [employer], your secure online medical record. [employer] will allow you to access patient instructions from your recent visit,  view other health information, and more. To sign up or find more information, go to [email or web address] and cl No

## 2023-07-05 NOTE — BH INPATIENT PSYCHIATRY PROGRESS NOTE - NSBHATTESTBILLING_PSY_A_CORE
Detail Level: Simple Continue Regimen: Apply triamcinolone cream as needed for itch\\nApply tacrolimus ointment once daily 55569-Abuxugelrr OBS or IP - high complexity OR 50-79 mins

## 2023-07-05 NOTE — BH SOCIAL WORK INITIAL PSYCHOSOCIAL EVALUATION - NSHIGHRISKBEHFT_PSY_ALL_CORE
AMS after being found lethargic at home by EMS with empty bottles of medications and initial suspicion of intentional overdose

## 2023-07-05 NOTE — BH SOCIAL WORK INITIAL PSYCHOSOCIAL EVALUATION - OTHER PAST PSYCHIATRIC HISTORY (INCLUDE DETAILS REGARDING ONSET, COURSE OF ILLNESS, INPATIENT/OUTPATIENT TREATMENT)
77 year old female, , retired, domiciled alone with PMH HTN, HLD, CAD, history of MI (September 2022) and PPH depression, anxiety, agoraphobia, anorexia nervosa (patient denies diagnosis), Cluster B Personality Traits, history of one prior inpatient psychiatric hospitalization at [facility] in 2016 for depression, currently engaged in outpatient psychiatric treatment with a [facility] licensed psychiatrist, no history of SA/NSSIB/Violence, no history of trauma, who presented with AMS after being found lethargic at home by EMS with empty bottles of medications and initial suspicion of intentional overdose, although the patient adamantly denies it was a suicide attempt.

## 2023-07-05 NOTE — BH INPATIENT PSYCHIATRY PROGRESS NOTE - NSBHASSESSSUMMFT_PSY_ALL_CORE
77 year old female, , retired, domiciled alone with PMH HTN, HLD, CAD, history of MI (September 2022) and PPH depression, anxiety, agoraphobia, anorexia nervosa (patient denies diagnosis), Cluster B Personality Traits, history of one prior inpatient psychiatric hospitalization at [facility] in 2016 for depression, currently engaged in outpatient psychiatric treatment with a [facility] licensed psychiatrist, no history of SA/NSSIB/Violence, no history of trauma, who presented with AMS after being found lethargic at home by EMS with empty bottles of medications and initial suspicion of intentional overdose, although the patient adamantly denies it was a suicide attempt.  The patient was assessed and followed by  Psychiatry who determined that overdose was likely unintentional and secondary to recent medication changes and polypharmacy.      On evaluation, the patient is calm, cooperative, polite and pleasant with good eye contact, well-related, euthymic affect and demonstrating good behavioral control and linear thought processes.  The patient reports daily low mood, anhedonia, worsening agoraphobia, poor appetite with weight loss (denies intentional weight loss) and hopelessness over the past several months.  She adamantly denies her overdose on home medications was intentional but, instead, the result of polypharmacy.  Collateral obtained from family members and outpatient psychiatrist confirm that the patient has not expressed SI or had any suicidal gestures. However, the patient's family and outpatient psychiatrist report concern for the patient's worsening depression (with significant weight loss for after her MI in September 2022).  The patient is agreeable to a voluntary inpatient psychiatric hospitalization for medication optimization and establishment of outpatient follow-up.  The patient's presentation is consistent with MDD.  Anorexia nervosa should be ruled out.      PLAN  1. Voluntary Admission to [facility]  2. No psychiatric indication for CO 1:1 observation  3. Continue mirtazapine (Remeron) 7.5 mg PO qhs  4. Hold home clonazepam (Klonopin) 1 mg PO BID for insomnia given associated risk of benzodiazapine for risk of falls and oversedation due to patient's age  5. CIWA monitoring for benzodiazepine withdrawal; can consider restarting clonazepam (Klonopin) 0.5 mg PO q12h PRN for benzodiazepine withdrawal with plan to taper; will continue to monitor  6.  Hold home Vilazodone (Viibryd) and Olanzapine (Zyprexa) given unclear benefit   7.  PRNs: haloperidol (Haldol) 2 mg PO/IM q6h PRN for agitation 77 year old female, , retired, domiciled alone with PMH HTN, HLD, CAD, history of MI (September 2022) and PPH depression, anxiety, agoraphobia, anorexia nervosa (patient denies diagnosis), Cluster B Personality Traits, history of one prior inpatient psychiatric hospitalization at [facility] in 2016 for depression, currently engaged in outpatient psychiatric treatment with a [facility] licensed psychiatrist, no history of SA/NSSIB/Violence, no history of trauma, who presented with AMS after being found lethargic at home by EMS with empty bottles of medications and initial suspicion of intentional overdose, although the patient adamantly denies it was a suicide attempt.  The patient was assessed and followed by  Psychiatry who determined that overdose was likely unintentional and secondary to recent medication changes and polypharmacy.      On initial evaluation, the patient is calm, cooperative, polite and pleasant with good eye contact, well-related, euthymic affect and demonstrating good behavioral control and linear thought processes.  The patient reports daily low mood, anhedonia, worsening agoraphobia, poor appetite with weight loss (denies intentional weight loss) and hopelessness over the past several months.  She adamantly denies her overdose on home medications was intentional but, instead, the result of polypharmacy.  Collateral obtained from family members and outpatient psychiatrist confirm that the patient has not expressed SI or had any suicidal gestures. However, the patient's family and outpatient psychiatrist report concern for the patient's worsening depression (with significant weight loss for after her MI in September 2022).      Upon reevaluation, patient continues to be calm, cooperative with good eye contact and linear thought process. Patient appears anxious at times but is overall euthymic in mood. Denies any current SI/I/P, HI/I/P or VH/AH. Patient states many symptoms consistent with a diagnosis of a major depressive episode. Events leading to her hospitalization could have been related to altered mental status due to her medication regimen of mirtazpine 60 mg nightly, olanazpine 20 mg nightly, and klonopin 1 mg nightly. Patient also displays anxiety with agoraphobia that should be further explored and assessed perhaps with psychology team. Patient reports many medication trials but most beneficial has been klonopin. Additionally patient's weight loss since her MI is concerning, as she currently has a BMI of 15.3. At this time appetite appears related to depressive symptoms but cannot rule out anorexia nervosa. Consult to nutrition is pending. The patient continues to be agreeable to a voluntary inpatient psychiatric hospitalization for medication optimization and establishment of outpatient follow-up.      PLAN  1. Voluntary Admission to [facility]  2. No psychiatric indication for CO 1:1 observation  3. Continue mirtazapine (Remeron) 7.5 mg PO qhs  4. Hold home clonazepam (Klonopin) 1 mg PO BID for insomnia given associated risk of benzodiazapine for risk of falls and oversedation due to patient's age  5. CIWA monitoring for benzodiazepine withdrawal; can consider restarting clonazepam (Klonopin) 0.5 mg PO q12h PRN for benzodiazepine withdrawal with plan to taper; will continue to monitor  6.  Hold home Vilazodone (Viibryd) and Olanzapine (Zyprexa) given unclear benefit   7.  PRNs: haloperidol (Haldol) 2 mg PO/IM q6h PRN for agitation

## 2023-07-05 NOTE — BH INPATIENT PSYCHIATRY PROGRESS NOTE - NSBHFUPINTERVALHXFT_PSY_A_CORE
Patient seen with treatment team. Endorses taking medications before hospitalizations was a mistake as she was "confused" and it was not a suicide attempt. Patient states she has been prescribed too many medications at a time by a psychiatrist she was seeing via telehealth. Patient denies any current SI/I/P. Reports wanting to live for her family, especially her granddaughter who will be going to college this fall. Patient denies any past suicide attempts. Patient was taking mirtazapine 60 mg oral daily, olanzapine in addition to klonopin nightly. Patient reports significant weight loss after her heart attack in Sep 2022 and poor appetite. Patient more recently has become more agoraphobic not wanting to leave her apartment during the day. Patient reports the most beneficial medication was klonopin as it takes away the "angst".  Denies any HI/I/P or VH/AH. Patient seen with treatment team. Endorses taking medications before hospitalization was a mistake as she was "confused" and it was not an overdose as part of a suicide attempt. Patient states she has been prescribed too many medications at a time by a psychiatrist she was seeing via telehealth. Patient denies any current SI/I/P. Reports wanting to live for her family, especially her granddaughter who will be going to college this fall. Patient denies any past suicide attempts. Patient was taking mirtazapine 60 mg oral daily, olanzapine in addition to klonopin nightly. Patient reports significant weight loss after her heart attack in Sep 2022 and poor appetite. Patient more recently has become "agoraphobic", not wanting to leave her apartment during the day. Patient reports the most beneficial medication was klonopin as it takes away the "angst".  Denies any HI/I/P or VH/AH.    Spoke to son Benjamin [phone]) during visiting hours. He notices his mother appears to be more like herself and doing well without her previous medications. Patient seen with treatment team. Endorses taking medications before hospitalization was a mistake as she was "confused" and it was not an overdose as part of a suicide attempt. Patient states she has been prescribed too many medications at a time by a psychiatrist she was seeing via telehealth. Patient denies any current SI/I/P. Reports wanting to live for her family, especially her granddaughter who will be going to college this fall. Patient denies any past suicide attempts. Patient was taking mirtazapine 60 mg oral daily, olanzapine in addition to Klonopin nightly. Patient reports significant weight loss after her heart attack in Sep 2022 and poor appetite. Patient more recently has become "agoraphobic", not wanting to leave her apartment during the day. Patient reports the most beneficial medication was klonopin as it takes away the "angst".  Denies any HI/I/P or VH/AH.    Spoke to son Benjamin [phone]) during visiting hours. He notices his mother appears to be more like herself and doing well without her previous medications.

## 2023-07-05 NOTE — BH INPATIENT PSYCHIATRY PROGRESS NOTE - NSBHMETABOLIC_PSY_ALL_CORE_FT
BMI: BMI (kg/m2): 15.3 (07-03-23 @ 20:44)  HbA1c:   Glucose: POCT Blood Glucose.: 114 mg/dL (07-01-23 @ 15:50)    BP: 166/82 (07-05-23 @ 10:05) (135/76 - 166/82)  Lipid Panel: Date/Time: 07-04-23 @ 06:48  Cholesterol, Serum: 124  Direct LDL: --  HDL Cholesterol, Serum: 61  Total Cholesterol/HDL Ration Measurement: --  Triglycerides, Serum: 90   BMI: BMI (kg/m2): 15.3 (07-03-23 @ 20:44)  HbA1c:   Glucose: POCT Blood Glucose.: 114 mg/dL (07-01-23 @ 15:50)    BP: 136/75 (07-05-23 @ 20:14) (135/76 - 166/82)  Lipid Panel: Date/Time: 07-04-23 @ 06:48  Cholesterol, Serum: 124  Direct LDL: --  HDL Cholesterol, Serum: 61  Total Cholesterol/HDL Ration Measurement: --  Triglycerides, Serum: 90

## 2023-07-05 NOTE — BH INPATIENT PSYCHIATRY PROGRESS NOTE - CURRENT MEDICATION
MEDICATIONS  (STANDING):  atorvastatin 10 milliGRAM(s) Oral at bedtime  carvedilol 3.125 milliGRAM(s) Oral every 12 hours  cholecalciferol 2000 Unit(s) Oral daily  clopidogrel Tablet 75 milliGRAM(s) Oral daily  levothyroxine 25 MICROGram(s) Oral daily  melatonin 3 milliGRAM(s) Oral at bedtime  mirtazapine 7.5 milliGRAM(s) Oral at bedtime  polyethylene glycol 3350 17 Gram(s) Oral at bedtime  sacubitril 24 mG/valsartan 26 mG 1 Tablet(s) Oral <User Schedule>    MEDICATIONS  (PRN):  aluminum hydroxide/magnesium hydroxide/simethicone Suspension 30 milliLiter(s) Oral every 4 hours PRN Dyspepsia  haloperidol     Tablet 2 milliGRAM(s) Oral every 6 hours PRN agitation

## 2023-07-05 NOTE — BH INPATIENT PSYCHIATRY PROGRESS NOTE - NSBHCHARTREVIEWVS_PSY_A_CORE FT
Vital Signs Last 24 Hrs  T(C): 36.9 (07-05-23 @ 10:05), Max: 36.9 (07-05-23 @ 10:05)  T(F): 98.4 (07-05-23 @ 10:05), Max: 98.4 (07-05-23 @ 10:05)  HR: 98 (07-05-23 @ 10:05) (98 - 106)  BP: 166/82 (07-05-23 @ 10:05) (141/66 - 166/82)  BP(mean): --  RR: 18 (07-05-23 @ 10:05) (18 - 20)  SpO2: 97% (07-05-23 @ 10:05) (96% - 97%)     Vital Signs Last 24 Hrs  T(C): 36.9 (07-05-23 @ 20:14), Max: 36.9 (07-05-23 @ 10:05)  T(F): 98.5 (07-05-23 @ 20:14), Max: 98.5 (07-05-23 @ 20:14)  HR: 93 (07-05-23 @ 20:14) (93 - 100)  BP: 136/75 (07-05-23 @ 20:14) (136/75 - 166/82)  BP(mean): --  RR: 18 (07-05-23 @ 20:14) (18 - 18)  SpO2: 96% (07-05-23 @ 20:14) (96% - 98%)

## 2023-07-06 LAB
A1C WITH ESTIMATED AVERAGE GLUCOSE RESULT: 5.1 % — SIGNIFICANT CHANGE UP (ref 4–5.6)
CHOLEST SERPL-MCNC: 140 MG/DL — SIGNIFICANT CHANGE UP
CULTURE RESULTS: SIGNIFICANT CHANGE UP
CULTURE RESULTS: SIGNIFICANT CHANGE UP
ESTIMATED AVERAGE GLUCOSE: 100 MG/DL — SIGNIFICANT CHANGE UP (ref 68–114)
HDLC SERPL-MCNC: 53 MG/DL — SIGNIFICANT CHANGE UP
LIPID PNL WITH DIRECT LDL SERPL: 65 MG/DL — SIGNIFICANT CHANGE UP
NON HDL CHOLESTEROL: 87 MG/DL — SIGNIFICANT CHANGE UP
SPECIMEN SOURCE: SIGNIFICANT CHANGE UP
SPECIMEN SOURCE: SIGNIFICANT CHANGE UP
TRIGL SERPL-MCNC: 112 MG/DL — SIGNIFICANT CHANGE UP

## 2023-07-06 PROCEDURE — 99232 SBSQ HOSP IP/OBS MODERATE 35: CPT

## 2023-07-06 RX ADMIN — SACUBITRIL AND VALSARTAN 1 TABLET(S): 24; 26 TABLET, FILM COATED ORAL at 10:41

## 2023-07-06 RX ADMIN — MIRTAZAPINE 7.5 MILLIGRAM(S): 45 TABLET, ORALLY DISINTEGRATING ORAL at 21:24

## 2023-07-06 RX ADMIN — CARVEDILOL PHOSPHATE 3.12 MILLIGRAM(S): 80 CAPSULE, EXTENDED RELEASE ORAL at 10:41

## 2023-07-06 RX ADMIN — ATORVASTATIN CALCIUM 10 MILLIGRAM(S): 80 TABLET, FILM COATED ORAL at 21:26

## 2023-07-06 RX ADMIN — Medication 25 MICROGRAM(S): at 10:41

## 2023-07-06 RX ADMIN — Medication 3 MILLIGRAM(S): at 21:24

## 2023-07-06 RX ADMIN — CLOPIDOGREL BISULFATE 75 MILLIGRAM(S): 75 TABLET, FILM COATED ORAL at 10:41

## 2023-07-06 RX ADMIN — CARVEDILOL PHOSPHATE 3.12 MILLIGRAM(S): 80 CAPSULE, EXTENDED RELEASE ORAL at 21:24

## 2023-07-06 RX ADMIN — Medication 2000 UNIT(S): at 10:41

## 2023-07-06 RX ADMIN — POLYETHYLENE GLYCOL 3350 17 GRAM(S): 17 POWDER, FOR SOLUTION ORAL at 21:27

## 2023-07-06 RX ADMIN — HALOPERIDOL DECANOATE 2 MILLIGRAM(S): 100 INJECTION INTRAMUSCULAR at 00:08

## 2023-07-06 NOTE — DIETITIAN INITIAL EVALUATION ADULT - OTHER CALCULATIONS
Based on Standards of Care pt below % IBW (73%) thus ideal body weight used for all calculations (130#). Needs adjusted for advanced age and malnutrition.

## 2023-07-06 NOTE — BH INPATIENT PSYCHIATRY PROGRESS NOTE - NSBHMETABOLIC_PSY_ALL_CORE_FT
BMI: BMI (kg/m2): 15.3 (07-03-23 @ 20:44)  HbA1c: A1C with Estimated Average Glucose Result: 5.1 % (07-06-23 @ 05:30)    Glucose: POCT Blood Glucose.: 114 mg/dL (07-01-23 @ 15:50)    BP: 161/78 (07-06-23 @ 09:09) (135/76 - 166/82)  Lipid Panel: Date/Time: 07-06-23 @ 05:30  Cholesterol, Serum: 140  Direct LDL: --  HDL Cholesterol, Serum: 53  Total Cholesterol/HDL Ration Measurement: --  Triglycerides, Serum: 112   BMI: BMI (kg/m2): 15.3 (07-03-23 @ 20:44)  HbA1c: A1C with Estimated Average Glucose Result: 5.1 % (07-06-23 @ 05:30)    Glucose: POCT Blood Glucose.: 114 mg/dL (07-01-23 @ 15:50)    BP: 114/72 (07-06-23 @ 17:02) (114/72 - 166/82)  Lipid Panel: Date/Time: 07-06-23 @ 05:30  Cholesterol, Serum: 140  Direct LDL: --  HDL Cholesterol, Serum: 53  Total Cholesterol/HDL Ration Measurement: --  Triglycerides, Serum: 112

## 2023-07-06 NOTE — BH INPATIENT PSYCHIATRY PROGRESS NOTE - NSBHCHARTREVIEWVS_PSY_A_CORE FT
Vital Signs Last 24 Hrs  T(C): 36.8 (07-06-23 @ 09:09), Max: 36.9 (07-05-23 @ 20:14)  T(F): 98.3 (07-06-23 @ 09:09), Max: 98.5 (07-05-23 @ 20:14)  HR: 97 (07-06-23 @ 09:09) (93 - 100)  BP: 161/78 (07-06-23 @ 09:09) (136/75 - 161/78)  BP(mean): --  RR: 18 (07-06-23 @ 09:09) (18 - 18)  SpO2: 96% (07-06-23 @ 09:09) (96% - 98%)     Vital Signs Last 24 Hrs  T(C): 36.8 (07-06-23 @ 17:02), Max: 36.8 (07-06-23 @ 09:09)  T(F): 98.2 (07-06-23 @ 17:02), Max: 98.3 (07-06-23 @ 09:09)  HR: 103 (07-06-23 @ 17:02) (97 - 103)  BP: 114/72 (07-06-23 @ 17:02) (114/72 - 161/78)  BP(mean): --  RR: 18 (07-06-23 @ 17:02) (18 - 18)  SpO2: 96% (07-06-23 @ 17:02) (96% - 96%)

## 2023-07-06 NOTE — BH INPATIENT PSYCHIATRY PROGRESS NOTE - NSBHFUPINTERVALHXFT_PSY_A_CORE
met with patient and her daughter and later with MSW.   Patient slept well with low dose Remeron 7.5mg and staff reported  no behavorial issues but unclear why patient received Haldol last night .  Patient would consider IOP for aftercare.  Need to monitor patient on minimal medications at this time to obtain some sense of baseline and consider need for appropriate antidepressant ; however this may need to ultimately be done as an outpatient.   Not endorsing  suicidal or homicidal ideation intent or plans; no mention of auditory or visual hallucinations .  Alert; oriented; cognition intact; speech clear; no tremor or evidence of movement impairment. Thinking is congruent with affect; no peculiarities of thinking or language use.

## 2023-07-06 NOTE — DIETITIAN INITIAL EVALUATION ADULT - OTHER INFO
77 year old female, , retired, domiciled alone with PMH HTN, HLD, CAD, history of MI (September 2022) and PPH depression, anxiety, agoraphobia, anorexia nervosa (patient denies diagnosis), Cluster B Personality Traits, history of one prior inpatient psychiatric hospitalization at [facility] in 2016 for depression, currently engaged in outpatient psychiatric treatment with a [facility] licensed psychiatrist, no history of SA/NSSIB/Violence, no history of trauma, who presented with AMS after being found lethargic at home by EMS with empty bottles of medications and initial suspicion of intentional overdose, although the patient adamantly denies it was a suicide attempt.     Patient seen at bedside for nutrition consult. Current diet order: DASH/TLC. NKFA. No difficulty chewing/swallowing reported. Reports poor appetite currently, consumed a few bites of cheerios for breakfast this AM, reports dislike of food here. Food preferences reviewed: pasta, rice, grilled cheese. PTA pt reports poor appetite for ~1 month. Encouraged intake of small, frequent meals. Pt previously receiving Ecal, however prefers Ensure, will add 3x/day. Educated pt on drinking supplement between meals to optimize PO intake at meal times. Dosing weight: 94.8#, BMI 15.3, reports UBW of 98# and endorses weight loss since admission to hospital d/t dislike of food. -3.2#/3% x 1 week, clinically significant. No pressure injuries or edema documented. Reports constipation, last BM 7/4 per EMR. Labs: Elevated BG. Meds: miralax, vitamin D3, mirtazapine. Observed with severe muscle/fat wasting. Based on ASPEN guidelines, pt meets criteria for severe malnutrition. See nutrition recommendations below.

## 2023-07-06 NOTE — DIETITIAN NUTRITION RISK NOTIFICATION - ADDITIONAL COMMENTS/DIETITIAN RECOMMENDATIONS
Recommend liberalize diet to Regular, add Ensure Enlive 3x/day (350kcal, 20g protein per serving)  Recommend liberalize diet to Regular, add Ensure Enlive 3x/day (350kcal, 20g protein per serving)     Educated pt on adequate PO intake, small frequent meals, drinking supplements between meals

## 2023-07-06 NOTE — DIETITIAN INITIAL EVALUATION ADULT - ADD RECOMMEND
1. Recommend liberalize diet to Regular, add Ensure Enlive 3x/day (350kcal, 20g protein per serving)   2. Encourage pt to meed nutritional needs as able   3. Monitor labs: electrolytes, cmp  4. Monitor weights   5. Pain and bowel regimen per team   6. Will continue to assess/honor food preferences as able  7. Monitor adherence to diet education   *discussed with team  1. Recommend liberalize diet to Regular, add Ensure Enlive 3x/day (350kcal, 20g protein per serving)   2. Encourage pt to meed nutritional needs as able   3. Monitor labs: electrolytes, cmp  4. Monitor weights   5. Pain and bowel regimen per team   6. Will continue to assess/honor food preferences as able  7. Monitor adherence to diet education   *paged team x2

## 2023-07-06 NOTE — BH INPATIENT PSYCHIATRY PROGRESS NOTE - NSBHASSESSSUMMFT_PSY_ALL_CORE
77 year old female, , retired, domiciled alone with PMH HTN, HLD, CAD, history of MI (September 2022) and PPH depression, anxiety, agoraphobia, anorexia nervosa (patient denies diagnosis), Cluster B Personality Traits, history of one prior inpatient psychiatric hospitalization at [facility] in 2016 for depression, currently engaged in outpatient psychiatric treatment with a [facility] licensed psychiatrist, no history of SA/NSSIB/Violence, no history of trauma, who presented with AMS after being found lethargic at home by EMS with empty bottles of medications and initial suspicion of intentional overdose, although the patient adamantly denies it was a suicide attempt.  The patient was assessed and followed by  Psychiatry who determined that overdose was likely unintentional and secondary to recent medication changes and polypharmacy.      On initial evaluation, the patient is calm, cooperative, polite and pleasant with good eye contact, well-related, euthymic affect and demonstrating good behavioral control and linear thought processes.  The patient reports daily low mood, anhedonia, worsening agoraphobia, poor appetite with weight loss (denies intentional weight loss) and hopelessness over the past several months.  She adamantly denies her overdose on home medications was intentional but, instead, the result of polypharmacy.  Collateral obtained from family members and outpatient psychiatrist confirm that the patient has not expressed SI or had any suicidal gestures. However, the patient's family and outpatient psychiatrist report concern for the patient's worsening depression (with significant weight loss for after her MI in September 2022).      Upon reevaluation, patient continues to be calm, cooperative with good eye contact and linear thought process. Patient appears anxious at times but is overall euthymic in mood. Denies any current SI/I/P, HI/I/P or VH/AH. Patient states many symptoms consistent with a diagnosis of a major depressive episode. Events leading to her hospitalization could have been related to altered mental status due to her medication regimen of mirtazpine 60 mg nightly, olanazpine 20 mg nightly, and klonopin 1 mg nightly. Patient also displays anxiety with agoraphobia that should be further explored and assessed perhaps with psychology team. Patient reports many medication trials but most beneficial has been klonopin. Additionally patient's weight loss since her MI is concerning, as she currently has a BMI of 15.3. At this time appetite appears related to depressive symptoms but cannot rule out anorexia nervosa. Consult to nutrition is pending. The patient continues to be agreeable to a voluntary inpatient psychiatric hospitalization for medication optimization and establishment of outpatient follow-up.      PLAN  1. Voluntary Admission to [facility]  2. No psychiatric indication for CO 1:1 observation  3. Continue mirtazapine (Remeron) 7.5 mg PO qhs  4. Hold home clonazepam (Klonopin) 1 mg PO BID for insomnia given associated risk of benzodiazapine for risk of falls and oversedation due to patient's age  5. CIWA monitoring for benzodiazepine withdrawal; can consider restarting clonazepam (Klonopin) 0.5 mg PO q12h PRN for benzodiazepine withdrawal with plan to taper; will continue to monitor  6.  Hold home Vilazodone (Viibryd) and Olanzapine (Zyprexa) given unclear benefit   7.  PRNs: haloperidol (Haldol) 2 mg PO/IM q6h PRN for agitation    ;;07/06: met with patient and her daughter and later with MSW.   Patient slept well with low dose Remeron 7.5mg and staff reported  no behavorial issues but unclear why patient received Haldol last night .  Patient would consider IOP for aftercare.  Need to monitor patient on minimal medications at this time to obtain some sense of baseline and consider need for appropriate antidepressant ; however this may need to ultimately be done as an outpatient.   Not endorsing  suicidal or homicidal ideation intent or plans; no mention of auditory or visual hallucinations .  Alert; oriented; cognition intact; speech clear; no tremor or evidence of movement impairment. Thinking is congruent with affect; no peculiarities of thinking or language use.

## 2023-07-07 PROCEDURE — 99232 SBSQ HOSP IP/OBS MODERATE 35: CPT

## 2023-07-07 RX ORDER — OLANZAPINE 15 MG/1
2.5 TABLET, FILM COATED ORAL AT BEDTIME
Refills: 0 | Status: DISCONTINUED | OUTPATIENT
Start: 2023-07-07 | End: 2023-07-10

## 2023-07-07 RX ADMIN — CLOPIDOGREL BISULFATE 75 MILLIGRAM(S): 75 TABLET, FILM COATED ORAL at 09:58

## 2023-07-07 RX ADMIN — Medication 3 MILLIGRAM(S): at 21:17

## 2023-07-07 RX ADMIN — MIRTAZAPINE 7.5 MILLIGRAM(S): 45 TABLET, ORALLY DISINTEGRATING ORAL at 21:23

## 2023-07-07 RX ADMIN — ATORVASTATIN CALCIUM 10 MILLIGRAM(S): 80 TABLET, FILM COATED ORAL at 21:17

## 2023-07-07 RX ADMIN — OLANZAPINE 2.5 MILLIGRAM(S): 15 TABLET, FILM COATED ORAL at 21:17

## 2023-07-07 RX ADMIN — Medication 25 MICROGRAM(S): at 09:58

## 2023-07-07 RX ADMIN — CARVEDILOL PHOSPHATE 3.12 MILLIGRAM(S): 80 CAPSULE, EXTENDED RELEASE ORAL at 09:58

## 2023-07-07 RX ADMIN — Medication 2000 UNIT(S): at 09:58

## 2023-07-07 RX ADMIN — SACUBITRIL AND VALSARTAN 1 TABLET(S): 24; 26 TABLET, FILM COATED ORAL at 09:58

## 2023-07-07 RX ADMIN — CARVEDILOL PHOSPHATE 3.12 MILLIGRAM(S): 80 CAPSULE, EXTENDED RELEASE ORAL at 21:18

## 2023-07-07 NOTE — BH INPATIENT PSYCHIATRY PROGRESS NOTE - CURRENT MEDICATION
MEDICATIONS  (STANDING):  atorvastatin 10 milliGRAM(s) Oral at bedtime  carvedilol 3.125 milliGRAM(s) Oral every 12 hours  cholecalciferol 2000 Unit(s) Oral daily  clopidogrel Tablet 75 milliGRAM(s) Oral daily  levothyroxine 25 MICROGram(s) Oral daily  melatonin 3 milliGRAM(s) Oral at bedtime  mirtazapine 7.5 milliGRAM(s) Oral at bedtime  polyethylene glycol 3350 17 Gram(s) Oral at bedtime  sacubitril 24 mG/valsartan 26 mG 1 Tablet(s) Oral <User Schedule>    MEDICATIONS  (PRN):  aluminum hydroxide/magnesium hydroxide/simethicone Suspension 30 milliLiter(s) Oral every 4 hours PRN Dyspepsia   MEDICATIONS  (STANDING):  atorvastatin 10 milliGRAM(s) Oral at bedtime  carvedilol 3.125 milliGRAM(s) Oral every 12 hours  cholecalciferol 2000 Unit(s) Oral daily  clopidogrel Tablet 75 milliGRAM(s) Oral daily  levothyroxine 25 MICROGram(s) Oral daily  melatonin 3 milliGRAM(s) Oral at bedtime  mirtazapine 7.5 milliGRAM(s) Oral at bedtime  OLANZapine 2.5 milliGRAM(s) Oral at bedtime  polyethylene glycol 3350 17 Gram(s) Oral at bedtime  sacubitril 24 mG/valsartan 26 mG 1 Tablet(s) Oral <User Schedule>    MEDICATIONS  (PRN):  aluminum hydroxide/magnesium hydroxide/simethicone Suspension 30 milliLiter(s) Oral every 4 hours PRN Dyspepsia

## 2023-07-07 NOTE — BH INPATIENT PSYCHIATRY PROGRESS NOTE - NSBHFUPINTERVALHXFT_PSY_A_CORE
met with patient and her daughter and later with MSW.   Patient slept well with low dose Remeron 7.5mg and staff reported  no behavorial issues but unclear why patient received Haldol last night .  Patient would consider IOP for aftercare.  Need to monitor patient on minimal medications at this time to obtain some sense of baseline and consider need for appropriate antidepressant ; however this may need to ultimately be done as an outpatient.   Not endorsing  suicidal or homicidal ideation intent or plans; no mention of auditory or visual hallucinations .  Alert; oriented; cognition intact; speech clear; no tremor or evidence of movement impairment. Thinking is congruent with affect; no peculiarities of thinking or language use.  Patient stated overnight she did not have a good rest. She slept only "minutes" last night and as a result feels like a "zombie". Patient is unsure why she was unable to sleep last night but ok the night before. Denies being worried or being awakened by any noises on the unit. Believes her mental acuity has improved since being on the unit without her home medication regimen. She says "I was in a cloud induced coma" looking back to when she arrived at the hospital.  Patient is very interested in continuing with an outpatient program, which would provide structure. She is worried to be discharged without a plan as she does not want to go back to the life she had, being in her apartment alone all the time.  Denies any SI/I/P or HI/I/P, denies any auditory or visual hallucinations.

## 2023-07-07 NOTE — BH INPATIENT PSYCHIATRY PROGRESS NOTE - NSBHASSESSSUMMFT_PSY_ALL_CORE
77 year old female, , retired, domiciled alone with PMH HTN, HLD, CAD, history of MI (September 2022) and PPH depression, anxiety, agoraphobia, anorexia nervosa (patient denies diagnosis), Cluster B Personality Traits, history of one prior inpatient psychiatric hospitalization at [facility] in 2016 for depression, currently engaged in outpatient psychiatric treatment with a [facility] licensed psychiatrist, no history of SA/NSSIB/Violence, no history of trauma, who presented with AMS after being found lethargic at home by EMS with empty bottles of medications and initial suspicion of intentional overdose, although the patient adamantly denies it was a suicide attempt.  The patient was assessed and followed by  Psychiatry who determined that overdose was likely unintentional and secondary to recent medication changes and polypharmacy.      On initial evaluation, the patient is calm, cooperative, polite and pleasant with good eye contact, well-related, euthymic affect and demonstrating good behavioral control and linear thought processes.  The patient reports daily low mood, anhedonia, worsening agoraphobia, poor appetite with weight loss (denies intentional weight loss) and hopelessness over the past several months.  She adamantly denies her overdose on home medications was intentional but, instead, the result of polypharmacy.  Collateral obtained from family members and outpatient psychiatrist confirm that the patient has not expressed SI or had any suicidal gestures. However, the patient's family and outpatient psychiatrist report concern for the patient's worsening depression (with significant weight loss for after her MI in September 2022).      Upon reevaluation, patient continues to be calm, cooperative with good eye contact and linear thought process. Patient appears anxious at times but is overall euthymic in mood. Denies any current SI/I/P, HI/I/P or VH/AH. Patient states many symptoms consistent with a diagnosis of a major depressive episode. Events leading to her hospitalization could have been related to altered mental status due to her medication regimen of mirtazpine 60 mg nightly, olanazpine 20 mg nightly, and klonopin 1 mg nightly. Patient also displays anxiety with agoraphobia that should be further explored and assessed perhaps with psychology team. Patient reports many medication trials but most beneficial has been klonopin. Additionally patient's weight loss since her MI is concerning, as she currently has a BMI of 15.3. At this time appetite appears related to depressive symptoms but cannot rule out anorexia nervosa. Consult to nutrition is pending. The patient continues to be agreeable to a voluntary inpatient psychiatric hospitalization for medication optimization and establishment of outpatient follow-up.      PLAN  1. Voluntary Admission to [facility]  2. No psychiatric indication for CO 1:1 observation  3. Continue mirtazapine (Remeron) 7.5 mg PO qhs  4. Hold home clonazepam (Klonopin) 1 mg PO BID for insomnia given associated risk of benzodiazapine for risk of falls and oversedation due to patient's age  5. CIWA monitoring for benzodiazepine withdrawal; can consider restarting clonazepam (Klonopin) 0.5 mg PO q12h PRN for benzodiazepine withdrawal with plan to taper; will continue to monitor  6.  Hold home Vilazodone (Viibryd) and Olanzapine (Zyprexa) given unclear benefit   7.  PRNs: haloperidol (Haldol) 2 mg PO/IM q6h PRN for agitation    ;;07/06: met with patient and her daughter and later with MSW.   Patient slept well with low dose Remeron 7.5mg and staff reported  no behavorial issues but unclear why patient received Haldol last night .  Patient would consider IOP for aftercare.  Need to monitor patient on minimal medications at this time to obtain some sense of baseline and consider need for appropriate antidepressant ; however this may need to ultimately be done as an outpatient.   Not endorsing  suicidal or homicidal ideation intent or plans; no mention of auditory or visual hallucinations .  Alert; oriented; cognition intact; speech clear; no tremor or evidence of movement impairment. Thinking is congruent with affect; no peculiarities of thinking or language use.  77 year old female, , retired, domiciled alone with PMH HTN, HLD, CAD, history of MI (September 2022) and PPH depression, anxiety, agoraphobia, anorexia nervosa (patient denies diagnosis), Cluster B Personality Traits, history of one prior inpatient psychiatric hospitalization at [facility] in 2016 for depression, currently engaged in outpatient psychiatric treatment with a [facility] licensed psychiatrist, no history of SA/NSSIB/Violence, no history of trauma, who presented with AMS after being found lethargic at home by EMS with empty bottles of medications and initial suspicion of intentional overdose, although the patient adamantly denies it was a suicide attempt.  The patient was assessed and followed by  Psychiatry who determined that overdose was likely unintentional and secondary to recent medication changes and polypharmacy.      On initial evaluation, the patient is calm, cooperative, polite and pleasant with good eye contact, well-related, euthymic affect and demonstrating good behavioral control and linear thought processes.  The patient reports daily low mood, anhedonia, worsening agoraphobia, poor appetite with weight loss (denies intentional weight loss) and hopelessness over the past several months.  She adamantly denies her overdose on home medications was intentional but, instead, the result of polypharmacy.  Collateral obtained from family members and outpatient psychiatrist confirm that the patient has not expressed SI or had any suicidal gestures. However, the patient's family and outpatient psychiatrist report concern for the patient's worsening depression (with significant weight loss for after her MI in September 2022).      Upon reevaluation, patient continues to be calm, cooperative with good eye contact and linear thought process. Patient appears anxious at times but is overall euthymic in mood. Denies any current SI/I/P, HI/I/P or VH/AH. Patient states many symptoms consistent with a diagnosis of a major depressive episode. Events leading to her hospitalization could have been related to altered mental status due to her medication regimen of mirtazpine 60 mg nightly, olanazpine 20 mg nightly, and klonopin 1 mg nightly. Patient also displays anxiety with agoraphobia that should be further explored and assessed perhaps with psychology team. Patient reports many medication trials but most beneficial has been klonopin. Additionally patient's weight loss since her MI is concerning, as she currently has a BMI of 15.3. At this time appetite appears related to depressive symptoms but cannot rule out anorexia nervosa. Evaluated by nutrition. The patient continues to be agreeable to a voluntary inpatient psychiatric hospitalization for medication optimization and establishment of outpatient follow-up.      PLAN  1. Voluntary Admission to [facility]  2. No psychiatric indication for CO 1:1 observation  3. Continue mirtazapine (Remeron) 7.5 mg PO qhs  4. Hold home clonazepam (Klonopin) 1 mg PO BID for insomnia given associated risk of benzodiazapine for risk of falls and oversedation due to patient's age  5. CIWA monitoring for benzodiazepine withdrawal; can consider restarting clonazepam (Klonopin) 0.5 mg PO q12h PRN for benzodiazepine withdrawal with plan to taper; will continue to monitor  6.  Hold home Vilazodone (Viibryd) and Olanzapine (Zyprexa) given unclear benefit   7.  PRNs: haloperidol (Haldol) 2 mg PO/IM q6h PRN for agitation    ;;07/06: met with patient and her daughter and later with MSW.   Patient slept well with low dose Remeron 7.5mg and staff reported  no behavorial issues but unclear why patient received Haldol last night .  Patient would consider IOP for aftercare.  Need to monitor patient on minimal medications at this time to obtain some sense of baseline and consider need for appropriate antidepressant ; however this may need to ultimately be done as an outpatient.   Not endorsing  suicidal or homicidal ideation intent or plans; no mention of auditory or visual hallucinations .  Alert; oriented; cognition intact; speech clear; no tremor or evidence of movement impairment. Thinking is congruent with affect; no peculiarities of thinking or language use.  77 year old female, , retired, domiciled alone with PMH HTN, HLD, CAD, history of MI (September 2022) and PPH depression, anxiety, agoraphobia, anorexia nervosa (patient denies diagnosis), Cluster B Personality Traits, history of one prior inpatient psychiatric hospitalization at [facility] in 2016 for depression, currently engaged in outpatient psychiatric treatment with a [facility] licensed psychiatrist, no history of SA/NSSIB/Violence, no history of trauma, who presented with AMS after being found lethargic at home by EMS with empty bottles of medications and initial suspicion of intentional overdose, although the patient adamantly denies it was a suicide attempt.  The patient was assessed and followed by  Psychiatry who determined that overdose was likely unintentional and secondary to recent medication changes and polypharmacy.      On initial evaluation, the patient is calm, cooperative, polite and pleasant with good eye contact, well-related, euthymic affect and demonstrating good behavioral control and linear thought processes.  The patient reports daily low mood, anhedonia, worsening agoraphobia, poor appetite with weight loss (denies intentional weight loss) and hopelessness over the past several months.  She adamantly denies her overdose on home medications was intentional but, instead, the result of polypharmacy.  Collateral obtained from family members and outpatient psychiatrist confirm that the patient has not expressed SI or had any suicidal gestures. However, the patient's family and outpatient psychiatrist report concern for the patient's worsening depression (with significant weight loss for after her MI in September 2022).      Patient continues to be calm, cooperative with good eye contact and linear thought process. Patient appears anxious at times but is overall euthymic in mood. Denies any current SI/I/P, HI/I/P or VH/AH. Patient states many symptoms consistent with a diagnosis of a major depressive episode. Events leading to her hospitalization could have been related to altered mental status due to her medication regimen of mirtazapine 60 mg nightly, olanzapine 20 mg nightly, and klonopin 1 mg nightly. Due to patient's age and altered mental status, reversible cause of dementia must be ruled out. Upon admission, TSH wnl, CT head showed non specific periventricular white matter changes however will follow up b12, b9, RPR and HIV results. Patient also displays anxiety with agoraphobia that should be further explored and assessed perhaps with psychology team. Patient reports many medication trials but most beneficial has been klonopin. Additionally patient's weight loss since her MI is concerning, as she currently has a BMI of 15.3. At this time appetite appears related to depressive symptoms but cannot rule out anorexia nervosa. Evaluated by nutrition. The patient continues to be agreeable to a voluntary inpatient psychiatric hospitalization for medication optimization and establishment of outpatient follow-up.      PLAN  1. Voluntary Admission to [facility]  2. No psychiatric indication for CO 1:1 observation  3. Continue mirtazapine (Remeron) 7.5 mg PO qhs  4. Add Zyprexa 2.5 mg nightly for sleep  5. F/u labs- B12, folate, RPR, HIV for AMS/dementia screening, possible neurology consult next week  6. Hold home clonazepam (Klonopin) 1 mg PO BID for insomnia given associated risk of benzodiazapine for risk of falls and oversedation due to patient's age  7. CIWA monitoring for benzodiazepine withdrawal; can consider restarting clonazepam (Klonopin) 0.5 mg PO q12h PRN for benzodiazepine withdrawal with plan to taper; will continue to monitor  8.  Hold home Vilazodone (Viibryd)   9.  PRNs: haloperidol (Haldol) 2 mg PO/IM q6h PRN for agitation    ;;07/06: met with patient and her daughter and later with MSW.   Patient slept well with low dose Remeron 7.5mg and staff reported  no behavorial issues but unclear why patient received Haldol last night .  Patient would consider IOP for aftercare.  Need to monitor patient on minimal medications at this time to obtain some sense of baseline and consider need for appropriate antidepressant ; however this may need to ultimately be done as an outpatient.   Not endorsing  suicidal or homicidal ideation intent or plans; no mention of auditory or visual hallucinations .  Alert; oriented; cognition intact; speech clear; no tremor or evidence of movement impairment. Thinking is congruent with affect; no peculiarities of thinking or language use.

## 2023-07-07 NOTE — BH INPATIENT PSYCHIATRY PROGRESS NOTE - NSBHMETABOLIC_PSY_ALL_CORE_FT
BMI: BMI (kg/m2): 15.3 (07-03-23 @ 20:44)  HbA1c: A1C with Estimated Average Glucose Result: 5.1 % (07-06-23 @ 05:30)    Glucose: POCT Blood Glucose.: 114 mg/dL (07-01-23 @ 15:50)    BP: 114/72 (07-06-23 @ 17:02) (114/72 - 166/82)  Lipid Panel: Date/Time: 07-06-23 @ 05:30  Cholesterol, Serum: 140  Direct LDL: --  HDL Cholesterol, Serum: 53  Total Cholesterol/HDL Ration Measurement: --  Triglycerides, Serum: 112   BMI: BMI (kg/m2): 15.3 (07-03-23 @ 20:44)  HbA1c: A1C with Estimated Average Glucose Result: 5.1 % (07-06-23 @ 05:30)    Glucose: POCT Blood Glucose.: 114 mg/dL (07-01-23 @ 15:50)    BP: 143/74 (07-07-23 @ 08:15) (114/72 - 166/82)  Lipid Panel: Date/Time: 07-06-23 @ 05:30  Cholesterol, Serum: 140  Direct LDL: --  HDL Cholesterol, Serum: 53  Total Cholesterol/HDL Ration Measurement: --  Triglycerides, Serum: 112   BMI: BMI (kg/m2): 15.3 (07-03-23 @ 20:44)  HbA1c: A1C with Estimated Average Glucose Result: 5.1 % (07-06-23 @ 05:30)    Glucose: POCT Blood Glucose.: 114 mg/dL (07-01-23 @ 15:50)    BP: 128/77 (07-07-23 @ 16:17) (114/72 - 166/82)  Lipid Panel: Date/Time: 07-06-23 @ 05:30  Cholesterol, Serum: 140  Direct LDL: --  HDL Cholesterol, Serum: 53  Total Cholesterol/HDL Ration Measurement: --  Triglycerides, Serum: 112

## 2023-07-07 NOTE — BH INPATIENT PSYCHIATRY PROGRESS NOTE - NSBHCHARTREVIEWVS_PSY_A_CORE FT
Vital Signs Last 24 Hrs  T(C): 36.8 (07-06-23 @ 17:02), Max: 36.8 (07-06-23 @ 17:02)  T(F): 98.2 (07-06-23 @ 17:02), Max: 98.2 (07-06-23 @ 17:02)  HR: 103 (07-06-23 @ 17:02) (103 - 103)  BP: 114/72 (07-06-23 @ 17:02) (114/72 - 114/72)  BP(mean): --  RR: 18 (07-06-23 @ 17:02) (18 - 18)  SpO2: 96% (07-06-23 @ 17:02) (96% - 96%)     Vital Signs Last 24 Hrs  T(C): 36.7 (07-07-23 @ 08:15), Max: 36.8 (07-06-23 @ 17:02)  T(F): 98 (07-07-23 @ 08:15), Max: 98.2 (07-06-23 @ 17:02)  HR: 104 (07-07-23 @ 08:15) (103 - 104)  BP: 143/74 (07-07-23 @ 08:15) (114/72 - 143/74)  BP(mean): --  RR: 18 (07-07-23 @ 08:15) (18 - 18)  SpO2: 93% (07-07-23 @ 08:15) (93% - 96%)     Vital Signs Last 24 Hrs  T(C): 36.6 (07-07-23 @ 16:17), Max: 36.7 (07-07-23 @ 08:15)  T(F): 97.8 (07-07-23 @ 16:17), Max: 98 (07-07-23 @ 08:15)  HR: 99 (07-07-23 @ 16:17) (99 - 104)  BP: 128/77 (07-07-23 @ 16:17) (128/77 - 143/74)  BP(mean): --  RR: 18 (07-07-23 @ 16:17) (18 - 18)  SpO2: 98% (07-07-23 @ 16:17) (93% - 98%)

## 2023-07-08 PROCEDURE — 99231 SBSQ HOSP IP/OBS SF/LOW 25: CPT

## 2023-07-08 RX ORDER — OLANZAPINE 15 MG/1
2.5 TABLET, FILM COATED ORAL ONCE
Refills: 0 | Status: COMPLETED | OUTPATIENT
Start: 2023-07-08 | End: 2023-07-08

## 2023-07-08 RX ADMIN — MIRTAZAPINE 7.5 MILLIGRAM(S): 45 TABLET, ORALLY DISINTEGRATING ORAL at 21:19

## 2023-07-08 RX ADMIN — CARVEDILOL PHOSPHATE 3.12 MILLIGRAM(S): 80 CAPSULE, EXTENDED RELEASE ORAL at 21:20

## 2023-07-08 RX ADMIN — SACUBITRIL AND VALSARTAN 1 TABLET(S): 24; 26 TABLET, FILM COATED ORAL at 10:11

## 2023-07-08 RX ADMIN — Medication 3 MILLIGRAM(S): at 21:19

## 2023-07-08 RX ADMIN — OLANZAPINE 2.5 MILLIGRAM(S): 15 TABLET, FILM COATED ORAL at 21:21

## 2023-07-08 RX ADMIN — Medication 25 MICROGRAM(S): at 06:45

## 2023-07-08 RX ADMIN — CARVEDILOL PHOSPHATE 3.12 MILLIGRAM(S): 80 CAPSULE, EXTENDED RELEASE ORAL at 06:45

## 2023-07-08 RX ADMIN — OLANZAPINE 2.5 MILLIGRAM(S): 15 TABLET, FILM COATED ORAL at 21:19

## 2023-07-08 RX ADMIN — ATORVASTATIN CALCIUM 10 MILLIGRAM(S): 80 TABLET, FILM COATED ORAL at 21:19

## 2023-07-08 RX ADMIN — Medication 2000 UNIT(S): at 10:11

## 2023-07-08 RX ADMIN — CLOPIDOGREL BISULFATE 75 MILLIGRAM(S): 75 TABLET, FILM COATED ORAL at 10:14

## 2023-07-08 NOTE — BH INPATIENT PSYCHIATRY PROGRESS NOTE - NSBHATTESTBILLING_PSY_A_CORE
[id number] - moderate complexity OR 30-39 mins [id number] - low complexity OR 20-29 mins

## 2023-07-08 NOTE — BH INPATIENT PSYCHIATRY PROGRESS NOTE - NSBHFUPINTERVALHXFT_PSY_A_CORE
Chart and nursing notes reviewed.  No acute events overnight, VSS.  The patient has been interacting appropriately with staff and peers and has been compliant with medications.  The patient continues to tolerate medications and denies any medication side effects.    On evaluation, the patient is calm, cooperative, demonstrating good behavioral control and linear thought processes.      Patient stated overnight she did not have a good rest. She slept only "minutes" last night and as a result feels like a "zombie". Patient is unsure why she was unable to sleep last night but ok the night before. Denies being worried or being awakened by any noises on the unit. Believes her mental acuity has improved since being on the unit without her home medication regimen. She says "I was in a cloud induced coma" looking back to when she arrived at the hospital.  Patient is very interested in continuing with an outpatient program, which would provide structure. She is worried to be discharged without a plan as she does not want to go back to the life she had, being in her apartment alone all the time.  Denies any SI/I/P or HI/I/P, denies any auditory or visual hallucinations. Chart and nursing notes reviewed.  No acute events overnight, VSS.  The patient has been interacting appropriately with staff and peers and has been compliant with medications.  The patient continues to tolerate medications and denies any medication side effects.    On evaluation, the patient is calm, cooperative, demonstrating good behavioral control and linear thought processes.  The patient reports that she is eager for discharge and was anxious this morning about the thought of remaining on [facility] another day.  The patient states that she had difficulty sleeping last night but that the olanzapine has helped.  Denies any SI/I/P or HI/I/P, denies any auditory or visual hallucinations.  All questions and concerns addressed.

## 2023-07-08 NOTE — BH INPATIENT PSYCHIATRY PROGRESS NOTE - NSBHASSESSSUMMFT_PSY_ALL_CORE
77 year old female, , retired, domiciled alone with PMH HTN, HLD, CAD, history of MI (September 2022) and PPH depression, anxiety, agoraphobia, anorexia nervosa (patient denies diagnosis), Cluster B Personality Traits, history of one prior inpatient psychiatric hospitalization at [facility] in 2016 for depression, currently engaged in outpatient psychiatric treatment with a [facility] licensed psychiatrist, no history of SA/NSSIB/Violence, no history of trauma, who presented with AMS after being found lethargic at home by EMS with empty bottles of medications and initial suspicion of intentional overdose, although the patient adamantly denies it was a suicide attempt.  The patient was assessed and followed by  Psychiatry who determined that overdose was likely unintentional and secondary to recent medication changes and polypharmacy.      On initial evaluation, the patient is calm, cooperative, polite and pleasant with good eye contact, well-related, euthymic affect and demonstrating good behavioral control and linear thought processes.  The patient reports daily low mood, anhedonia, worsening agoraphobia, poor appetite with weight loss (denies intentional weight loss) and hopelessness over the past several months.  She adamantly denies her overdose on home medications was intentional but, instead, the result of polypharmacy.  Collateral obtained from family members and outpatient psychiatrist confirm that the patient has not expressed SI or had any suicidal gestures. However, the patient's family and outpatient psychiatrist report concern for the patient's worsening depression (with significant weight loss for after her MI in September 2022).      Patient continues to be calm, cooperative with good eye contact and linear thought process. Patient appears anxious at times but is overall euthymic in mood. Denies any current SI/I/P, HI/I/P or VH/AH. Patient states many symptoms consistent with a diagnosis of a major depressive episode. Events leading to her hospitalization could have been related to altered mental status due to her medication regimen of mirtazapine 60 mg nightly, olanzapine 20 mg nightly, and klonopin 1 mg nightly. Due to patient's age and altered mental status, reversible cause of dementia must be ruled out. Upon admission, TSH wnl, CT head showed non specific periventricular white matter changes however will follow up b12, b9, RPR and HIV results. Patient also displays anxiety with agoraphobia that should be further explored and assessed perhaps with psychology team. Patient reports many medication trials but most beneficial has been klonopin. Additionally patient's weight loss since her MI is concerning, as she currently has a BMI of 15.3. At this time appetite appears related to depressive symptoms but cannot rule out anorexia nervosa. Evaluated by nutrition. The patient continues to be agreeable to a voluntary inpatient psychiatric hospitalization for medication optimization and establishment of outpatient follow-up.      PLAN  1. Voluntary Admission to [facility]  2. No psychiatric indication for CO 1:1 observation  3. Continue mirtazapine (Remeron) 7.5 mg PO qhs  4. Continue olanzapine (Zyprexa) 2.5 mg PO nightly for sleep/mood  5. F/u labs- B12, folate, RPR, HIV for AMS/dementia screening, possible neurology consult next week  6. Hold home clonazepam (Klonopin) 1 mg PO BID for insomnia given associated risk of benzodiazapine for risk of falls and oversedation due to patient's age  7. CIWA monitoring for benzodiazepine withdrawal; can consider restarting clonazepam (Klonopin) 0.5 mg PO q12h PRN for benzodiazepine withdrawal with plan to taper; will continue to monitor  8.  Hold home Vilazodone (Viibryd)   9.  PRNs: haloperidol (Haldol) 2 mg PO/IM q6h PRN for agitation    ;;07/06: met with patient and her daughter and later with MSW.   Patient slept well with low dose Remeron 7.5mg and staff reported  no behavorial issues but unclear why patient received Haldol last night .  Patient would consider IOP for aftercare.  Need to monitor patient on minimal medications at this time to obtain some sense of baseline and consider need for appropriate antidepressant ; however this may need to ultimately be done as an outpatient.   Not endorsing  suicidal or homicidal ideation intent or plans; no mention of auditory or visual hallucinations .  Alert; oriented; cognition intact; speech clear; no tremor or evidence of movement impairment. Thinking is congruent with affect; no peculiarities of thinking or language use.  77 year old female, , retired, domiciled alone with PMH HTN, HLD, CAD, history of MI (September 2022) and PPH depression, anxiety, agoraphobia, anorexia nervosa (patient denies diagnosis), Cluster B Personality Traits, history of one prior inpatient psychiatric hospitalization at [facility] in 2016 for depression, currently engaged in outpatient psychiatric treatment with a [facility] licensed psychiatrist, no history of SA/NSSIB/Violence, no history of trauma, who presented with AMS after being found lethargic at home by EMS with empty bottles of medications and initial suspicion of intentional overdose, although the patient adamantly denies it was a suicide attempt.  The patient was assessed and followed by  Psychiatry who determined that overdose was likely unintentional and secondary to recent medication changes and polypharmacy.      On initial evaluation, the patient is calm, cooperative, polite and pleasant with good eye contact, well-related, euthymic affect and demonstrating good behavioral control and linear thought processes.  The patient reports daily low mood, anhedonia, worsening agoraphobia, poor appetite with weight loss (denies intentional weight loss) and hopelessness over the past several months.  She adamantly denies her overdose on home medications was intentional but, instead, the result of polypharmacy.  Collateral obtained from family members and outpatient psychiatrist confirm that the patient has not expressed SI or had any suicidal gestures. However, the patient's family and outpatient psychiatrist report concern for the patient's worsening depression (with significant weight loss for after her MI in September 2022).      Patient continues to be calm, cooperative with good eye contact and linear thought process. Patient appears anxious at times but is overall euthymic in mood. Denies any current SI/I/P, HI/I/P or VH/AH. Patient states many symptoms consistent with a diagnosis of a major depressive episode. Events leading to her hospitalization could have been related to altered mental status due to her medication regimen of mirtazapine 60 mg nightly, olanzapine 20 mg nightly, and klonopin 1 mg nightly. Due to patient's age and altered mental status, reversible cause of dementia must be ruled out. Upon admission, TSH wnl, CT head showed non specific periventricular white matter changes however will follow up b12, b9, RPR and HIV results. Patient also displays anxiety with agoraphobia that should be further explored and assessed perhaps with psychology team. Patient reports many medication trials but most beneficial has been klonopin. Additionally patient's weight loss since her MI is concerning, as she currently has a BMI of 15.3. At this time appetite appears related to depressive symptoms but cannot rule out anorexia nervosa. Evaluated by nutrition. The patient continues to be agreeable to a voluntary inpatient psychiatric hospitalization for medication optimization and establishment of outpatient follow-up.      PLAN  1. Voluntary Admission to [facility]  2. No psychiatric indication for CO 1:1 observation  3. Continue mirtazapine (Remeron) 7.5 mg PO qhs  4. Continue olanzapine (Zyprexa) 2.5 mg PO nightly for sleep/mood  5. F/u labs- B12, folate, RPR, HIV for AMS/dementia screening, possible neurology consult next week  6. Hold home clonazepam (Klonopin) 1 mg PO BID for insomnia given associated risk of benzodiazapine for risk of falls and oversedation due to patient's age  7. CIWA monitoring for benzodiazepine withdrawal; can consider restarting clonazepam (Klonopin) 0.5 mg PO q12h PRN for benzodiazepine withdrawal with plan to taper; will continue to monitor  8.  Hold home Vilazodone (Viibryd)   9.  PRNs: haloperidol (Haldol) 2 mg PO/IM q6h PRN for agitation    ;;07/06: met with patient and her daughter and later with MSW.   Patient slept well with low dose Remeron 7.5mg and staff reported  no behavorial issues but unclear why patient received Haldol last night .  Patient would consider IOP for aftercare.  Need to monitor patient on minimal medications at this time to obtain some sense of baseline and consider need for appropriate antidepressant ; however this may need to ultimately be done as an outpatient.   Not endorsing  suicidal or homicidal ideation intent or plans; no mention of auditory or visual hallucinations .  Alert; oriented; cognition intact; speech clear; no tremor or evidence of movement impairment. Thinking is congruent with affect; no peculiarities of thinking or language use.   ;;07/08:  Patient reports improvement in mood but states that she has experienced increased anxiety at the thought or remaining on [facility].  She reports sleep has improved with olanzapine 2.5 mg PO qhs along with mirtazapine 7.5 mg PO qHs.  The patient denies SI/HI, intent, plan and AVH.

## 2023-07-08 NOTE — BH INPATIENT PSYCHIATRY PROGRESS NOTE - NSBHCHARTREVIEWINVESTIGATE_PSY_A_CORE FT
ECG (07/02/23):    Ventricular Rate 102 BPM    Atrial Rate 102 BPM    P-R Interval 148 ms    QRS Duration 72 ms    Q-T Interval 352 ms    QTC Calculation(Bazett) 458 ms    P Axis 83 degrees    R Axis 68 degrees    T Axis 69 degrees    Diagnosis Line Sinus tachycardia    Confirmed by Sinai Sosa ([id number]) on 7/3/2023 5:16:40 PM  
ECG (07/02/23):    Ventricular Rate 102 BPM    Atrial Rate 102 BPM    P-R Interval 148 ms    QRS Duration 72 ms    Q-T Interval 352 ms    QTC Calculation(Bazett) 458 ms    P Axis 83 degrees    R Axis 68 degrees    T Axis 69 degrees    Diagnosis Line Sinus tachycardia    Confirmed by Sinai Sosa ([id number]) on 7/3/2023 5:16:40 PM  
ECG (07/02/23):    Ventricular Rate 102 BPM    Atrial Rate 102 BPM    P-R Interval 148 ms    QRS Duration 72 ms    Q-T Interval 352 ms    QTC Calculation(Bazett) 458 ms    P Axis 83 degrees    R Axis 68 degrees    T Axis 69 degrees    Diagnosis Line Sinus tachycardia    Confirmed by Sinai Sosa ([id number]) on 7/3/2023 5:16:40 PM  
ECG (07/02/23):    Ventricular Rate 102 BPM    Atrial Rate 102 BPM    P-R Interval 148 ms    QRS Duration 72 ms    Q-T Interval 352 ms    QTC Calculation(Bazett) 458 ms    P Axis 83 degrees    R Axis 68 degrees    T Axis 69 degrees    Diagnosis Line Sinus tachycardia    Confirmed by Sinai Sosa ([id number]) on 7/3/2023 5:16:40 PM

## 2023-07-08 NOTE — BH INPATIENT PSYCHIATRY PROGRESS NOTE - NSBHCHARTREVIEWVS_PSY_A_CORE FT
Vital Signs Last 24 Hrs  T(C): 36.6 (07-07-23 @ 16:17), Max: 36.6 (07-07-23 @ 16:17)  T(F): 97.8 (07-07-23 @ 16:17), Max: 97.8 (07-07-23 @ 16:17)  HR: 101 (07-08-23 @ 06:20) (99 - 101)  BP: 180/95 (07-08-23 @ 06:20) (128/77 - 180/95)  BP(mean): --  RR: 18 (07-08-23 @ 06:20) (18 - 18)  SpO2: 98% (07-08-23 @ 06:20) (98% - 98%)     Vital Signs Last 24 Hrs  T(C): 36.4 (07-08-23 @ 19:45), Max: 36.7 (07-08-23 @ 08:28)  T(F): 97.6 (07-08-23 @ 19:45), Max: 98.1 (07-08-23 @ 08:28)  HR: 105 (07-08-23 @ 19:45) (94 - 105)  BP: 102/68 (07-08-23 @ 19:45) (102/68 - 180/95)  BP(mean): --  RR: 18 (07-08-23 @ 19:45) (18 - 18)  SpO2: 97% (07-08-23 @ 19:45) (97% - 100%)

## 2023-07-08 NOTE — BH INPATIENT PSYCHIATRY PROGRESS NOTE - CURRENT MEDICATION
MEDICATIONS  (STANDING):  atorvastatin 10 milliGRAM(s) Oral at bedtime  carvedilol 3.125 milliGRAM(s) Oral every 12 hours  cholecalciferol 2000 Unit(s) Oral daily  clopidogrel Tablet 75 milliGRAM(s) Oral daily  levothyroxine 25 MICROGram(s) Oral daily  melatonin 3 milliGRAM(s) Oral at bedtime  mirtazapine 7.5 milliGRAM(s) Oral at bedtime  OLANZapine 2.5 milliGRAM(s) Oral at bedtime  polyethylene glycol 3350 17 Gram(s) Oral at bedtime  sacubitril 24 mG/valsartan 26 mG 1 Tablet(s) Oral <User Schedule>    MEDICATIONS  (PRN):  aluminum hydroxide/magnesium hydroxide/simethicone Suspension 30 milliLiter(s) Oral every 4 hours PRN Dyspepsia

## 2023-07-09 LAB — HIV 1+2 AB+HIV1 P24 AG SERPL QL IA: SIGNIFICANT CHANGE UP

## 2023-07-09 RX ADMIN — Medication 30 MILLILITER(S): at 05:11

## 2023-07-09 RX ADMIN — MIRTAZAPINE 7.5 MILLIGRAM(S): 45 TABLET, ORALLY DISINTEGRATING ORAL at 21:09

## 2023-07-09 RX ADMIN — Medication 30 MILLILITER(S): at 14:58

## 2023-07-09 RX ADMIN — Medication 2000 UNIT(S): at 10:01

## 2023-07-09 RX ADMIN — CARVEDILOL PHOSPHATE 3.12 MILLIGRAM(S): 80 CAPSULE, EXTENDED RELEASE ORAL at 21:11

## 2023-07-09 RX ADMIN — Medication 3 MILLIGRAM(S): at 21:10

## 2023-07-09 RX ADMIN — ATORVASTATIN CALCIUM 10 MILLIGRAM(S): 80 TABLET, FILM COATED ORAL at 21:10

## 2023-07-09 RX ADMIN — SACUBITRIL AND VALSARTAN 1 TABLET(S): 24; 26 TABLET, FILM COATED ORAL at 10:02

## 2023-07-09 RX ADMIN — CLOPIDOGREL BISULFATE 75 MILLIGRAM(S): 75 TABLET, FILM COATED ORAL at 10:01

## 2023-07-09 RX ADMIN — Medication 25 MICROGRAM(S): at 10:02

## 2023-07-09 RX ADMIN — OLANZAPINE 2.5 MILLIGRAM(S): 15 TABLET, FILM COATED ORAL at 21:10

## 2023-07-09 RX ADMIN — CARVEDILOL PHOSPHATE 3.12 MILLIGRAM(S): 80 CAPSULE, EXTENDED RELEASE ORAL at 10:02

## 2023-07-10 LAB
FOLATE SERPL-MCNC: >20 NG/ML — SIGNIFICANT CHANGE UP
T PALLIDUM AB TITR SER: NEGATIVE — SIGNIFICANT CHANGE UP
VIT B12 SERPL-MCNC: >2000 PG/ML — HIGH (ref 232–1245)

## 2023-07-10 PROCEDURE — 99232 SBSQ HOSP IP/OBS MODERATE 35: CPT

## 2023-07-10 RX ORDER — OLANZAPINE 15 MG/1
5 TABLET, FILM COATED ORAL AT BEDTIME
Refills: 0 | Status: DISCONTINUED | OUTPATIENT
Start: 2023-07-10 | End: 2023-07-17

## 2023-07-10 RX ADMIN — Medication 2000 UNIT(S): at 11:10

## 2023-07-10 RX ADMIN — CARVEDILOL PHOSPHATE 3.12 MILLIGRAM(S): 80 CAPSULE, EXTENDED RELEASE ORAL at 11:09

## 2023-07-10 RX ADMIN — CLOPIDOGREL BISULFATE 75 MILLIGRAM(S): 75 TABLET, FILM COATED ORAL at 11:10

## 2023-07-10 RX ADMIN — Medication 3 MILLIGRAM(S): at 21:07

## 2023-07-10 RX ADMIN — SACUBITRIL AND VALSARTAN 1 TABLET(S): 24; 26 TABLET, FILM COATED ORAL at 11:09

## 2023-07-10 RX ADMIN — Medication 30 MILLILITER(S): at 00:32

## 2023-07-10 RX ADMIN — Medication 25 MICROGRAM(S): at 07:36

## 2023-07-10 RX ADMIN — ATORVASTATIN CALCIUM 10 MILLIGRAM(S): 80 TABLET, FILM COATED ORAL at 21:08

## 2023-07-10 RX ADMIN — OLANZAPINE 5 MILLIGRAM(S): 15 TABLET, FILM COATED ORAL at 21:07

## 2023-07-10 RX ADMIN — CARVEDILOL PHOSPHATE 3.12 MILLIGRAM(S): 80 CAPSULE, EXTENDED RELEASE ORAL at 21:07

## 2023-07-10 RX ADMIN — MIRTAZAPINE 7.5 MILLIGRAM(S): 45 TABLET, ORALLY DISINTEGRATING ORAL at 21:06

## 2023-07-10 NOTE — BH INPATIENT PSYCHIATRY PROGRESS NOTE - NSBHCHARTREVIEWVS_PSY_A_CORE FT
Vital Signs Last 24 Hrs  T(C): 36.6 (07-10-23 @ 09:03), Max: 36.9 (07-09-23 @ 20:33)  T(F): 97.9 (07-10-23 @ 09:03), Max: 98.5 (07-09-23 @ 20:33)  HR: 109 (07-10-23 @ 09:03) (92 - 109)  BP: 115/62 (07-10-23 @ 09:03) (115/62 - 156/85)  BP(mean): --  RR: 18 (07-10-23 @ 09:03) (18 - 19)  SpO2: 96% (07-10-23 @ 09:03) (96% - 96%)     Vital Signs Last 24 Hrs  T(C): 36.6 (07-10-23 @ 16:37), Max: 36.9 (07-09-23 @ 20:33)  T(F): 97.9 (07-10-23 @ 16:37), Max: 98.5 (07-09-23 @ 20:33)  HR: 93 (07-10-23 @ 16:37) (92 - 109)  BP: 139/67 (07-10-23 @ 16:37) (115/62 - 156/85)  BP(mean): --  RR: 19 (07-10-23 @ 16:37) (18 - 19)  SpO2: 97% (07-10-23 @ 16:37) (96% - 97%)

## 2023-07-10 NOTE — BH INPATIENT PSYCHIATRY PROGRESS NOTE - NSBHMETABOLIC_PSY_ALL_CORE_FT
BMI: BMI (kg/m2): 15.3 (07-03-23 @ 20:44)  HbA1c: A1C with Estimated Average Glucose Result: 5.1 % (07-06-23 @ 05:30)    Glucose: POCT Blood Glucose.: 114 mg/dL (07-01-23 @ 15:50)    BP: 115/62 (07-10-23 @ 09:03) (102/68 - 180/95)  Lipid Panel: Date/Time: 07-06-23 @ 05:30  Cholesterol, Serum: 140  Direct LDL: --  HDL Cholesterol, Serum: 53  Total Cholesterol/HDL Ration Measurement: --  Triglycerides, Serum: 112   BMI: BMI (kg/m2): 15.3 (07-03-23 @ 20:44)  HbA1c: A1C with Estimated Average Glucose Result: 5.1 % (07-06-23 @ 05:30)    Glucose: POCT Blood Glucose.: 114 mg/dL (07-01-23 @ 15:50)    BP: 139/67 (07-10-23 @ 16:37) (102/68 - 180/95)  Lipid Panel: Date/Time: 07-06-23 @ 05:30  Cholesterol, Serum: 140  Direct LDL: --  HDL Cholesterol, Serum: 53  Total Cholesterol/HDL Ration Measurement: --  Triglycerides, Serum: 112

## 2023-07-10 NOTE — BH INPATIENT PSYCHIATRY PROGRESS NOTE - NSBHASSESSSUMMFT_PSY_ALL_CORE
77 year old female, , retired, domiciled alone with PMH HTN, HLD, CAD, history of MI (September 2022) and PPH depression, anxiety, agoraphobia, anorexia nervosa (patient denies diagnosis), Cluster B Personality Traits, history of one prior inpatient psychiatric hospitalization at [facility] in 2016 for depression, currently engaged in outpatient psychiatric treatment with a [facility] licensed psychiatrist, no history of SA/NSSIB/Violence, no history of trauma, who presented with AMS after being found lethargic at home by EMS with empty bottles of medications and initial suspicion of intentional overdose, although the patient adamantly denies it was a suicide attempt.  The patient was assessed and followed by  Psychiatry who determined that overdose was likely unintentional and secondary to recent medication changes and polypharmacy.      On initial evaluation, the patient is calm, cooperative, polite and pleasant with good eye contact, well-related, euthymic affect and demonstrating good behavioral control and linear thought processes.  The patient reports daily low mood, anhedonia, worsening agoraphobia, poor appetite with weight loss (denies intentional weight loss) and hopelessness over the past several months.  She adamantly denies her overdose on home medications was intentional but, instead, the result of polypharmacy.  Collateral obtained from family members and outpatient psychiatrist confirm that the patient has not expressed SI or had any suicidal gestures. However, the patient's family and outpatient psychiatrist report concern for the patient's worsening depression (with significant weight loss for after her MI in September 2022).      Patient continues to be calm, cooperative with good eye contact and linear thought process. Patient appears anxious at times but is overall euthymic in mood. Denies any current SI/I/P, HI/I/P or VH/AH. Patient states many symptoms consistent with a diagnosis of a major depressive episode. Events leading to her hospitalization could have been related to altered mental status due to her medication regimen of mirtazapine 60 mg nightly, olanzapine 20 mg nightly, and klonopin 1 mg nightly. Due to patient's age and altered mental status, reversible cause of dementia must be ruled out. Upon admission, TSH wnl, CT head showed non specific periventricular white matter changes however will follow up b12, b9, RPR and HIV results. Patient also displays anxiety with agoraphobia that should be further explored and assessed perhaps with psychology team. Patient reports many medication trials but most beneficial has been klonopin. Additionally patient's weight loss since her MI is concerning, as she currently has a BMI of 15.3. At this time appetite appears related to depressive symptoms but cannot rule out anorexia nervosa. Evaluated by nutrition. The patient continues to be agreeable to a voluntary inpatient psychiatric hospitalization for medication optimization and establishment of outpatient follow-up.      PLAN  1. Voluntary Admission to [facility]  2. No psychiatric indication for CO 1:1 observation  3. Continue mirtazapine (Remeron) 7.5 mg PO qhs  4. Continue olanzapine (Zyprexa) 2.5 mg PO nightly for sleep/mood  5. F/u labs- B12, folate, RPR, HIV for AMS/dementia screening, possible neurology consult next week  6. Hold home clonazepam (Klonopin) 1 mg PO BID for insomnia given associated risk of benzodiazapine for risk of falls and oversedation due to patient's age  7. CIWA monitoring for benzodiazepine withdrawal; can consider restarting clonazepam (Klonopin) 0.5 mg PO q12h PRN for benzodiazepine withdrawal with plan to taper; will continue to monitor  8.  Hold home Vilazodone (Viibryd)   9.  PRNs: haloperidol (Haldol) 2 mg PO/IM q6h PRN for agitation    ;;07/06: met with patient and her daughter and later with MSW.   Patient slept well with low dose Remeron 7.5mg and staff reported  no behavorial issues but unclear why patient received Haldol last night .  Patient would consider IOP for aftercare.  Need to monitor patient on minimal medications at this time to obtain some sense of baseline and consider need for appropriate antidepressant ; however this may need to ultimately be done as an outpatient.   Not endorsing  suicidal or homicidal ideation intent or plans; no mention of auditory or visual hallucinations .  Alert; oriented; cognition intact; speech clear; no tremor or evidence of movement impairment. Thinking is congruent with affect; no peculiarities of thinking or language use.   ;;07/08:  Patient reports improvement in mood but states that she has experienced increased anxiety at the thought or remaining on [facility].  She reports sleep has improved with olanzapine 2.5 mg PO qhs along with mirtazapine 7.5 mg PO qHs.  The patient denies SI/HI, intent, plan and AVH.  77 year old female, , retired, domiciled alone with PMH HTN, HLD, CAD, history of MI (September 2022) and PPH depression, anxiety, agoraphobia, anorexia nervosa (patient denies diagnosis), Cluster B Personality Traits, history of one prior inpatient psychiatric hospitalization at [facility] in 2016 for depression, currently engaged in outpatient psychiatric treatment with a [facility] licensed psychiatrist, no history of SA/NSSIB/Violence, no history of trauma, who presented with AMS after being found lethargic at home by EMS with empty bottles of medications and initial suspicion of intentional overdose, although the patient adamantly denies it was a suicide attempt.  The patient was assessed and followed by  Psychiatry who determined that overdose was likely unintentional and secondary to recent medication changes and polypharmacy.      On initial evaluation, the patient is calm, cooperative, polite and pleasant with good eye contact, well-related, euthymic affect and demonstrating good behavioral control and linear thought processes.  The patient reports daily low mood, anhedonia, worsening agoraphobia, poor appetite with weight loss (denies intentional weight loss) and hopelessness over the past several months.  She adamantly denies her overdose on home medications was intentional but, instead, the result of polypharmacy.  Collateral obtained from family members and outpatient psychiatrist confirm that the patient has not expressed SI or had any suicidal gestures. However, the patient's family and outpatient psychiatrist report concern for the patient's worsening depression (with significant weight loss for after her MI in September 2022).      Patient continues to be calm, cooperative with good eye contact and linear thought process. Patient appears anxious at times but is overall euthymic in mood. Denies any current SI/I/P, HI/I/P or VH/AH. Patient states many symptoms consistent with a diagnosis of a major depressive episode. Events leading to her hospitalization could have been related to altered mental status due to her medication regimen of mirtazapine 60 mg nightly, olanzapine 20 mg nightly, and klonopin 1 mg nightly. Due to patient's age and altered mental status, reversible cause of dementia must be ruled out. Upon admission, TSH wnl, CT head showed non specific periventricular white matter changes. B12, b9, HIV within normal limits, RPR pending. Will follow up neurology consult. Patient also displays anxiety with agoraphobia that should be further explored and assessed perhaps with psychology team. Patient reports many medication trials but most beneficial has been klonopin. Additionally patient's weight loss since her MI is concerning, as she currently has a BMI of 15.3. At this time appetite appears related to depressive symptoms but cannot rule out anorexia nervosa. Evaluated by nutrition. The patient continues to be agreeable to a voluntary inpatient psychiatric hospitalization for medication optimization and establishment of outpatient follow-up.      PLAN  1. Voluntary Admission to [facility]  2. No psychiatric indication for CO 1:1 observation  3. Continue mirtazapine (Remeron) 7.5 mg PO qhs  4. Increase olanzapine (Zyprexa) 5 mg PO nightly for sleep/mood  5. B12, folate, HIV for AMS/dementia screening, possible neurology consult next week  6. Hold home clonazepam (Klonopin) 1 mg PO BID for insomnia given associated risk of benzodiazapine for risk of falls and oversedation due to patient's age  7. CIWA monitoring for benzodiazepine withdrawal; can consider restarting clonazepam (Klonopin) 0.5 mg PO q12h PRN for benzodiazepine withdrawal with plan to taper; will continue to monitor  8.  Hold home Vilazodone (Viibryd)   9.  PRNs: haloperidol (Haldol) 2 mg PO/IM q6h PRN for agitation    ;;07/06: met with patient and her daughter and later with MSW.   Patient slept well with low dose Remeron 7.5mg and staff reported  no behavorial issues but unclear why patient received Haldol last night .  Patient would consider IOP for aftercare.  Need to monitor patient on minimal medications at this time to obtain some sense of baseline and consider need for appropriate antidepressant ; however this may need to ultimately be done as an outpatient.   Not endorsing  suicidal or homicidal ideation intent or plans; no mention of auditory or visual hallucinations .  Alert; oriented; cognition intact; speech clear; no tremor or evidence of movement impairment. Thinking is congruent with affect; no peculiarities of thinking or language use.   ;;07/08:  Patient reports improvement in mood but states that she has experienced increased anxiety at the thought or remaining on [facility].  She reports sleep has improved with olanzapine 2.5 mg PO qhs along with mirtazapine 7.5 mg PO qHs.  The patient denies SI/HI, intent, plan and AVH.  77 year old female, , retired, domiciled alone with PMH HTN, HLD, CAD, history of MI (September 2022) and PPH depression, anxiety, agoraphobia, anorexia nervosa (patient denies diagnosis), Cluster B Personality Traits, history of one prior inpatient psychiatric hospitalization at [facility] in 2016 for depression, currently engaged in outpatient psychiatric treatment with a [facility] licensed psychiatrist, no history of SA/NSSIB/Violence, no history of trauma, who presented with AMS after being found lethargic at home by EMS with empty bottles of medications and initial suspicion of intentional overdose, although the patient adamantly denies it was a suicide attempt.  The patient was assessed and followed by  Psychiatry who determined that overdose was likely unintentional and secondary to recent medication changes and polypharmacy.      On initial evaluation, the patient is calm, cooperative, polite and pleasant with good eye contact, well-related, euthymic affect and demonstrating good behavioral control and linear thought processes.  The patient reports daily low mood, anhedonia, worsening agoraphobia, poor appetite with weight loss (denies intentional weight loss) and hopelessness over the past several months.  She adamantly denies her overdose on home medications was intentional but, instead, the result of polypharmacy.  Collateral obtained from family members and outpatient psychiatrist confirm that the patient has not expressed SI or had any suicidal gestures. However, the patient's family and outpatient psychiatrist report concern for the patient's worsening depression (with significant weight loss for after her MI in September 2022).      Patient continues to be calm, cooperative with good eye contact and linear thought process. Patient appears anxious at times but is overall euthymic in mood. Denies any current SI/I/P, HI/I/P or VH/AH. Patient states many symptoms consistent with a diagnosis of a major depressive episode. Events leading to her hospitalization could have been related to altered mental status due to her medication regimen of mirtazapine 60 mg nightly, olanzapine 20 mg nightly, and klonopin 1 mg nightly. Due to patient's age and altered mental status, reversible cause of dementia must be ruled out. Upon admission, TSH wnl, CT head showed non specific periventricular white matter changes. B12, b9, HIV within normal limits, RPR pending. Will follow up neurology consult. Patient also displays anxiety with agoraphobia that should be further explored and assessed perhaps with psychology team. Patient reports many medication trials but most beneficial has been klonopin. Additionally patient's weight loss since her MI is concerning, as she currently has a BMI of 15.3. At this time appetite appears related to depressive symptoms but cannot rule out anorexia nervosa. Evaluated by nutrition. The patient continues to be agreeable to a voluntary inpatient psychiatric hospitalization for medication optimization and establishment of outpatient follow-up.      PLAN  1. Voluntary Admission to [facility]  2. No psychiatric indication for CO 1:1 observation  3. Continue mirtazapine (Remeron) 7.5 mg PO qhs  4. Increase olanzapine (Zyprexa) 5 mg PO nightly for sleep/mood  5. B12, folate, HIV for AMS/dementia screening, possible neurology consult next week  6. Hold home clonazepam (Klonopin) 1 mg PO BID for insomnia given associated risk of benzodiazapine for risk of falls and oversedation due to patient's age  7. CIWA monitoring for benzodiazepine withdrawal; can consider restarting clonazepam (Klonopin) 0.5 mg PO q12h PRN for benzodiazepine withdrawal with plan to taper; will continue to monitor  8.  Hold home Vilazodone (Viibryd)   9.  PRNs: haloperidol (Haldol) 2 mg PO/IM q6h PRN for agitation    ;;07/06: met with patient and her daughter and later with MSW.   Patient slept well with low dose Remeron 7.5mg and staff reported  no behavorial issues but unclear why patient received Haldol last night .  Patient would consider IOP for aftercare.  Need to monitor patient on minimal medications at this time to obtain some sense of baseline and consider need for appropriate antidepressant ; however this may need to ultimately be done as an outpatient.   Not endorsing  suicidal or homicidal ideation intent or plans; no mention of auditory or visual hallucinations .  Alert; oriented; cognition intact; speech clear; no tremor or evidence of movement impairment. Thinking is congruent with affect; no peculiarities of thinking or language use.   ;;07/08:  Patient reports improvement in mood but states that she has experienced increased anxiety at the thought or remaining on [facility].  She reports sleep has improved with olanzapine 2.5 mg PO qhs along with mirtazapine 7.5 mg PO qHs.  The patient denies SI/HI, intent, plan and AVH.   ;;07/10: poor early sleep; raising Olanzepine to 5mg at night; continue neuro w/u;  for transition to IOP.  77 year old female, , retired, domiciled alone with PMH HTN, HLD, CAD, history of MI (September 2022) and PPH depression, anxiety, agoraphobia, anorexia nervosa (patient denies diagnosis), Cluster B Personality Traits, history of one prior inpatient psychiatric hospitalization at [facility] in 2016 for depression, currently engaged in outpatient psychiatric treatment with a [facility] licensed psychiatrist, no history of SA/NSSIB/Violence, no history of trauma, who presented with AMS after being found lethargic at home by EMS with empty bottles of medications and initial suspicion of intentional overdose, although the patient adamantly denies it was a suicide attempt.  The patient was assessed and followed by  Psychiatry who determined that overdose was likely unintentional and secondary to recent medication changes and polypharmacy.      On initial evaluation, the patient is calm, cooperative, polite and pleasant with good eye contact, well-related, euthymic affect and demonstrating good behavioral control and linear thought processes.  The patient reports daily low mood, anhedonia, worsening agoraphobia, poor appetite with weight loss (denies intentional weight loss) and hopelessness over the past several months.  She adamantly denies her overdose on home medications was intentional but, instead, the result of polypharmacy.  Collateral obtained from family members and outpatient psychiatrist confirm that the patient has not expressed SI or had any suicidal gestures. However, the patient's family and outpatient psychiatrist report concern for the patient's worsening depression (with significant weight loss for after her MI in September 2022).      Patient continues to be calm, cooperative with good eye contact and linear thought process. Patient appears anxious at times but is overall euthymic in mood. Denies any current SI/I/P, HI/I/P or VH/AH. Patient states many symptoms consistent with a diagnosis of a major depressive episode. Events leading to her hospitalization could have been related to altered mental status due to her medication regimen of mirtazapine 60 mg nightly, olanzapine 20 mg nightly, and klonopin 1 mg nightly. Due to patient's age and altered mental status, reversible cause of dementia must be ruled out. Upon admission, TSH wnl, CT head showed non specific periventricular white matter changes. B12, b9, HIV within normal limits, RPR pending. Patient also displays anxiety with agoraphobia that should be further explored and assessed perhaps with psychology team. Patient reports many medication trials but most beneficial has been klonopin. Additionally patient's weight loss since her MI is concerning, as she currently has a BMI of 15.3. At this time appetite appears related to depressive symptoms but cannot rule out anorexia nervosa. Evaluated by nutrition. The patient continues to be agreeable to a voluntary inpatient psychiatric hospitalization for medication optimization and establishment of outpatient follow-up.      PLAN  1. Voluntary Admission to [facility]  2. No psychiatric indication for CO 1:1 observation  3. Continue mirtazapine (Remeron) 7.5 mg PO qhs  4. Increase olanzapine (Zyprexa) 5 mg PO nightly for sleep/mood  5. Hold home clonazepam (Klonopin) 1 mg PO BID for insomnia given associated risk of benzodiazapine for risk of falls and oversedation due to patient's age  6. CIWA monitoring for benzodiazepine withdrawal; can consider restarting clonazepam (Klonopin) 0.5 mg PO q12h PRN for benzodiazepine withdrawal with plan to taper; will continue to monitor  7.  Hold home Vilazodone (Viibryd)   8.  PRNs: haloperidol (Haldol) 2 mg PO/IM q6h PRN for agitation    ;;07/06: met with patient and her daughter and later with MSW.   Patient slept well with low dose Remeron 7.5mg and staff reported  no behavorial issues but unclear why patient received Haldol last night .  Patient would consider IOP for aftercare.  Need to monitor patient on minimal medications at this time to obtain some sense of baseline and consider need for appropriate antidepressant ; however this may need to ultimately be done as an outpatient.   Not endorsing  suicidal or homicidal ideation intent or plans; no mention of auditory or visual hallucinations .  Alert; oriented; cognition intact; speech clear; no tremor or evidence of movement impairment. Thinking is congruent with affect; no peculiarities of thinking or language use.   ;;07/08:  Patient reports improvement in mood but states that she has experienced increased anxiety at the thought or remaining on [facility].  She reports sleep has improved with olanzapine 2.5 mg PO qhs along with mirtazapine 7.5 mg PO qHs.  The patient denies SI/HI, intent, plan and AVH.   ;;07/10: poor early sleep; raising Olanzepine to 5mg at night; continue neuro w/u;  for transition to IOP.

## 2023-07-10 NOTE — BH INPATIENT PSYCHIATRY PROGRESS NOTE - NSBHFUPINTERVALHXFT_PSY_A_CORE
Patient states she did not sleep well this weekend. Reports difficulty falling asleep and then when unable having "anxiety provoked thoughts" about not sleeping or the plan for when she is discharged. Patient states she has physical symptoms becoming restless (having to sit up, walk around), with shortness of breath and increasing heart rate. Patient says this lasts for the whole night despite her trying deep breathing exercises. Patient still reports no increase in appetite. Patient denies any SI/I/P, HI/I/P, VH or AH.     Patient is hopeful that the PHP program will work out. Although patient stated she was becoming "agoraphobic" upon admission, states that if she had a reason to leave the house each day this would provide "inspiration" to go do different things.     B12, folate, HIV tests within normal limits, RPR pending.    Patient states she did not sleep well this weekend. Reports difficulty falling asleep and then when unable having "anxiety provoked thoughts" about not sleeping or the plan for when she is discharged. Patient states she has physical symptoms becoming restless (having to sit up, walk around), with shortness of breath and increasing heart rate. Patient says this lasts for the whole night despite her trying deep breathing exercises. Patient still reports no increase in appetite. Patient denies any SI/I/P, HI/I/P, VH or AH.     Patient is hopeful that the PHP program will work out. Although patient stated she was becoming "agoraphobic" upon admission, states that if she had a reason to leave the house each day this would provide "inspiration" to go do different things.     B12, folate, RPR, HIV tests within normal limits. Discussed case verbally with neurology for AMS/dementia evaluation and there were no acute issues at this time.

## 2023-07-10 NOTE — BH INPATIENT PSYCHIATRY PROGRESS NOTE - CURRENT MEDICATION
MEDICATIONS  (STANDING):  atorvastatin 10 milliGRAM(s) Oral at bedtime  carvedilol 3.125 milliGRAM(s) Oral every 12 hours  cholecalciferol 2000 Unit(s) Oral daily  clopidogrel Tablet 75 milliGRAM(s) Oral daily  levothyroxine 25 MICROGram(s) Oral daily  melatonin 3 milliGRAM(s) Oral at bedtime  mirtazapine 7.5 milliGRAM(s) Oral at bedtime  OLANZapine 2.5 milliGRAM(s) Oral at bedtime  polyethylene glycol 3350 17 Gram(s) Oral at bedtime  sacubitril 24 mG/valsartan 26 mG 1 Tablet(s) Oral <User Schedule>    MEDICATIONS  (PRN):  aluminum hydroxide/magnesium hydroxide/simethicone Suspension 30 milliLiter(s) Oral every 4 hours PRN Dyspepsia   MEDICATIONS  (STANDING):  atorvastatin 10 milliGRAM(s) Oral at bedtime  carvedilol 3.125 milliGRAM(s) Oral every 12 hours  cholecalciferol 2000 Unit(s) Oral daily  clopidogrel Tablet 75 milliGRAM(s) Oral daily  levothyroxine 25 MICROGram(s) Oral daily  melatonin 3 milliGRAM(s) Oral at bedtime  mirtazapine 7.5 milliGRAM(s) Oral at bedtime  OLANZapine 5 milliGRAM(s) Oral at bedtime  polyethylene glycol 3350 17 Gram(s) Oral at bedtime  sacubitril 24 mG/valsartan 26 mG 1 Tablet(s) Oral <User Schedule>    MEDICATIONS  (PRN):  aluminum hydroxide/magnesium hydroxide/simethicone Suspension 30 milliLiter(s) Oral every 4 hours PRN Dyspepsia

## 2023-07-11 PROCEDURE — 99222 1ST HOSP IP/OBS MODERATE 55: CPT

## 2023-07-11 RX ORDER — DIPHENHYDRAMINE HCL 50 MG
25 CAPSULE ORAL AT BEDTIME
Refills: 0 | Status: DISCONTINUED | OUTPATIENT
Start: 2023-07-11 | End: 2023-07-17

## 2023-07-11 RX ADMIN — CARVEDILOL PHOSPHATE 3.12 MILLIGRAM(S): 80 CAPSULE, EXTENDED RELEASE ORAL at 10:40

## 2023-07-11 RX ADMIN — Medication 3 MILLIGRAM(S): at 21:16

## 2023-07-11 RX ADMIN — Medication 2000 UNIT(S): at 10:40

## 2023-07-11 RX ADMIN — Medication 25 MILLIGRAM(S): at 21:15

## 2023-07-11 RX ADMIN — ATORVASTATIN CALCIUM 10 MILLIGRAM(S): 80 TABLET, FILM COATED ORAL at 21:16

## 2023-07-11 RX ADMIN — CLOPIDOGREL BISULFATE 75 MILLIGRAM(S): 75 TABLET, FILM COATED ORAL at 10:40

## 2023-07-11 RX ADMIN — SACUBITRIL AND VALSARTAN 1 TABLET(S): 24; 26 TABLET, FILM COATED ORAL at 10:40

## 2023-07-11 RX ADMIN — Medication 25 MICROGRAM(S): at 06:26

## 2023-07-11 RX ADMIN — CARVEDILOL PHOSPHATE 3.12 MILLIGRAM(S): 80 CAPSULE, EXTENDED RELEASE ORAL at 21:15

## 2023-07-11 RX ADMIN — OLANZAPINE 5 MILLIGRAM(S): 15 TABLET, FILM COATED ORAL at 21:17

## 2023-07-11 NOTE — BH INPATIENT PSYCHIATRY PROGRESS NOTE - CURRENT MEDICATION
MEDICATIONS  (STANDING):  atorvastatin 10 milliGRAM(s) Oral at bedtime  carvedilol 3.125 milliGRAM(s) Oral every 12 hours  cholecalciferol 2000 Unit(s) Oral daily  clopidogrel Tablet 75 milliGRAM(s) Oral daily  levothyroxine 25 MICROGram(s) Oral daily  melatonin 3 milliGRAM(s) Oral at bedtime  mirtazapine 7.5 milliGRAM(s) Oral at bedtime  OLANZapine 5 milliGRAM(s) Oral at bedtime  polyethylene glycol 3350 17 Gram(s) Oral at bedtime  sacubitril 24 mG/valsartan 26 mG 1 Tablet(s) Oral <User Schedule>    MEDICATIONS  (PRN):  aluminum hydroxide/magnesium hydroxide/simethicone Suspension 30 milliLiter(s) Oral every 4 hours PRN Dyspepsia   MEDICATIONS  (STANDING):  atorvastatin 10 milliGRAM(s) Oral at bedtime  carvedilol 3.125 milliGRAM(s) Oral every 12 hours  cholecalciferol 2000 Unit(s) Oral daily  clopidogrel Tablet 75 milliGRAM(s) Oral daily  diphenhydrAMINE 25 milliGRAM(s) Oral at bedtime  levothyroxine 25 MICROGram(s) Oral daily  melatonin 3 milliGRAM(s) Oral at bedtime  OLANZapine 5 milliGRAM(s) Oral at bedtime  polyethylene glycol 3350 17 Gram(s) Oral at bedtime  sacubitril 24 mG/valsartan 26 mG 1 Tablet(s) Oral <User Schedule>    MEDICATIONS  (PRN):  aluminum hydroxide/magnesium hydroxide/simethicone Suspension 30 milliLiter(s) Oral every 4 hours PRN Dyspepsia

## 2023-07-11 NOTE — BH INPATIENT PSYCHIATRY PROGRESS NOTE - NSBHFUPINTERVALCCFT_PSY_A_CORE
"I didn't sleep well this weekend"  Patient denies being suicidal but risk is MODERATE because of h/o depression; current risk LOW to MODERATE while in treatment.  "I didn't sleep well"  Patient denies being suicidal but risk is MODERATE because of h/o depression; current risk LOW to MODERATE while in treatment.

## 2023-07-11 NOTE — BH INPATIENT PSYCHIATRY PROGRESS NOTE - NSBHCHARTREVIEWVS_PSY_A_CORE FT
Vital Signs Last 24 Hrs  T(C): 36.6 (07-10-23 @ 21:44), Max: 36.6 (07-10-23 @ 09:03)  T(F): 97.8 (07-10-23 @ 21:44), Max: 97.9 (07-10-23 @ 09:03)  HR: 88 (07-10-23 @ 21:44) (88 - 109)  BP: 128/77 (07-10-23 @ 21:44) (115/62 - 139/67)  BP(mean): --  RR: 18 (07-10-23 @ 21:44) (18 - 19)  SpO2: 99% (07-10-23 @ 21:44) (96% - 99%)     Vital Signs Last 24 Hrs  T(C): 36.6 (07-10-23 @ 21:44), Max: 36.6 (07-10-23 @ 16:37)  T(F): 97.8 (07-10-23 @ 21:44), Max: 97.9 (07-10-23 @ 16:37)  HR: 88 (07-10-23 @ 21:44) (88 - 93)  BP: 128/77 (07-10-23 @ 21:44) (128/77 - 139/67)  BP(mean): --  RR: 18 (07-10-23 @ 21:44) (18 - 19)  SpO2: 99% (07-10-23 @ 21:44) (97% - 99%)     Vital Signs Last 24 Hrs  T(C): 36.6 (07-11-23 @ 08:55), Max: 36.6 (07-10-23 @ 16:37)  T(F): 97.9 (07-11-23 @ 08:55), Max: 97.9 (07-10-23 @ 16:37)  HR: 93 (07-11-23 @ 08:55) (88 - 93)  BP: 135/73 (07-11-23 @ 08:55) (128/77 - 139/67)  BP(mean): --  RR: 18 (07-11-23 @ 08:55) (18 - 19)  SpO2: 96% (07-11-23 @ 08:55) (96% - 99%)     Vital Signs Last 24 Hrs  T(C): 36.6 (07-11-23 @ 16:58), Max: 36.6 (07-10-23 @ 21:44)  T(F): 97.9 (07-11-23 @ 16:58), Max: 97.9 (07-11-23 @ 08:55)  HR: 96 (07-11-23 @ 16:58) (88 - 96)  BP: 146/66 (07-11-23 @ 16:58) (128/77 - 146/66)  BP(mean): --  RR: 18 (07-11-23 @ 08:55) (18 - 18)  SpO2: 98% (07-11-23 @ 16:58) (96% - 99%)

## 2023-07-11 NOTE — BH INPATIENT PSYCHIATRY PROGRESS NOTE - NSBHASSESSSUMMFT_PSY_ALL_CORE
77 year old female, , retired, domiciled alone with PMH HTN, HLD, CAD, history of MI (September 2022) and PPH depression, anxiety, agoraphobia, anorexia nervosa (patient denies diagnosis), Cluster B Personality Traits, history of one prior inpatient psychiatric hospitalization at [facility] in 2016 for depression, currently engaged in outpatient psychiatric treatment with a [facility] licensed psychiatrist, no history of SA/NSSIB/Violence, no history of trauma, who presented with AMS after being found lethargic at home by EMS with empty bottles of medications and initial suspicion of intentional overdose, although the patient adamantly denies it was a suicide attempt.  The patient was assessed and followed by  Psychiatry who determined that overdose was likely unintentional and secondary to recent medication changes and polypharmacy.      On initial evaluation, the patient is calm, cooperative, polite and pleasant with good eye contact, well-related, euthymic affect and demonstrating good behavioral control and linear thought processes.  The patient reports daily low mood, anhedonia, worsening agoraphobia, poor appetite with weight loss (denies intentional weight loss) and hopelessness over the past several months.  She adamantly denies her overdose on home medications was intentional but, instead, the result of polypharmacy.  Collateral obtained from family members and outpatient psychiatrist confirm that the patient has not expressed SI or had any suicidal gestures. However, the patient's family and outpatient psychiatrist report concern for the patient's worsening depression (with significant weight loss for after her MI in September 2022).      Patient continues to be calm, cooperative with good eye contact and linear thought process. Patient appears anxious at times but is overall euthymic in mood. Denies any current SI/I/P, HI/I/P or VH/AH. Patient states many symptoms consistent with a diagnosis of a major depressive episode. Events leading to her hospitalization could have been related to altered mental status due to her medication regimen of mirtazapine 60 mg nightly, olanzapine 20 mg nightly, and klonopin 1 mg nightly. Due to patient's age and altered mental status, reversible cause of dementia must be ruled out. Upon admission, TSH wnl, CT head showed non specific periventricular white matter changes. B12, b9, HIV within normal limits, RPR pending. Patient also displays anxiety with agoraphobia that should be further explored and assessed perhaps with psychology team. Patient reports many medication trials but most beneficial has been klonopin. Additionally patient's weight loss since her MI is concerning, as she currently has a BMI of 15.3. At this time appetite appears related to depressive symptoms but cannot rule out anorexia nervosa. Evaluated by nutrition. The patient continues to be agreeable to a voluntary inpatient psychiatric hospitalization for medication optimization and establishment of outpatient follow-up.      PLAN  1. Voluntary Admission to [facility]  2. No psychiatric indication for CO 1:1 observation  3. Continue mirtazapine (Remeron) 7.5 mg PO qhs  4. Increase olanzapine (Zyprexa) 5 mg PO nightly for sleep/mood  5. Hold home clonazepam (Klonopin) 1 mg PO BID for insomnia given associated risk of benzodiazapine for risk of falls and oversedation due to patient's age  6. CIWA monitoring for benzodiazepine withdrawal; can consider restarting clonazepam (Klonopin) 0.5 mg PO q12h PRN for benzodiazepine withdrawal with plan to taper; will continue to monitor  7.  Hold home Vilazodone (Viibryd)   8.  PRNs: haloperidol (Haldol) 2 mg PO/IM q6h PRN for agitation    ;;07/06: met with patient and her daughter and later with MSW.   Patient slept well with low dose Remeron 7.5mg and staff reported  no behavorial issues but unclear why patient received Haldol last night .  Patient would consider IOP for aftercare.  Need to monitor patient on minimal medications at this time to obtain some sense of baseline and consider need for appropriate antidepressant ; however this may need to ultimately be done as an outpatient.   Not endorsing  suicidal or homicidal ideation intent or plans; no mention of auditory or visual hallucinations .  Alert; oriented; cognition intact; speech clear; no tremor or evidence of movement impairment. Thinking is congruent with affect; no peculiarities of thinking or language use.   ;;07/08:  Patient reports improvement in mood but states that she has experienced increased anxiety at the thought or remaining on [facility].  She reports sleep has improved with olanzapine 2.5 mg PO qhs along with mirtazapine 7.5 mg PO qHs.  The patient denies SI/HI, intent, plan and AVH.   ;;07/10: poor early sleep; raising Olanzepine to 5mg at night; continue neuro w/u;  for transition to IOP.  77 year old female, , retired, domiciled alone with PMH HTN, HLD, CAD, history of MI (September 2022) and PPH depression, anxiety, agoraphobia, anorexia nervosa (patient denies diagnosis), Cluster B Personality Traits, history of one prior inpatient psychiatric hospitalization at [facility] in 2016 for depression, currently engaged in outpatient psychiatric treatment with a [facility] licensed psychiatrist, no history of SA/NSSIB/Violence, no history of trauma, who presented with AMS after being found lethargic at home by EMS with empty bottles of medications and initial suspicion of intentional overdose, although the patient adamantly denies it was a suicide attempt.  The patient was assessed and followed by  Psychiatry who determined that overdose was likely unintentional and secondary to recent medication changes and polypharmacy.      On initial evaluation, the patient is calm, cooperative, polite and pleasant with good eye contact, well-related, euthymic affect and demonstrating good behavioral control and linear thought processes.  The patient reports daily low mood, anhedonia, worsening agoraphobia, poor appetite with weight loss (denies intentional weight loss) and hopelessness over the past several months.  She adamantly denies her overdose on home medications was intentional but, instead, the result of polypharmacy.  Collateral obtained from family members and outpatient psychiatrist confirm that the patient has not expressed SI or had any suicidal gestures. However, the patient's family and outpatient psychiatrist report concern for the patient's worsening depression (with significant weight loss for after her MI in September 2022).      Patient continues to be calm, cooperative with good eye contact and linear thought process. Patient appears anxious at times but is overall euthymic in mood. Denies any current SI/I/P, HI/I/P or VH/AH. Patient states many symptoms consistent with a diagnosis of a major depressive episode. Events leading to her hospitalization could have been related to altered mental status due to her medication regimen of mirtazapine 60 mg nightly, olanzapine 20 mg nightly, and klonopin 1 mg nightly. Due to patient's age and altered mental status, reversible cause of dementia must be ruled out. Upon admission, TSH wnl, CT head showed non specific periventricular white matter changes. B12, b9, HIV, RPR within normal limits, will be evaluated by neurology today. Patient also displays anxiety with agoraphobia that should be further explored and assessed perhaps with psychology team. Patient reports many medication trials but most beneficial has been klonopin. Due to poor sleep with mirtazapine will try starting benadryl nightly. Additionally patient's weight loss since her MI is concerning, as she currently has a BMI of 15.3. At this time appetite appears related to depressive symptoms but cannot rule out anorexia nervosa. Evaluated by nutrition. The patient continues to be agreeable to a voluntary inpatient psychiatric hospitalization for medication optimization and establishment of outpatient follow-up.      PLAN  1. Voluntary Admission to [facility]  2. No psychiatric indication for CO 1:1 observation  3. D/c mirtazapine (Remeron) 7.5 mg PO qhs  4. Start benadryl 25 mg po nightly   5. Increase olanzapine (Zyprexa) 5 mg PO nightly for sleep/mood  6. Hold home clonazepam (Klonopin) 1 mg PO BID for insomnia given associated risk of benzodiazapine for risk of falls and oversedation due to patient's age  7. CIWA monitoring for benzodiazepine withdrawal; can consider restarting clonazepam (Klonopin) 0.5 mg PO q12h PRN for benzodiazepine withdrawal with plan to taper; will continue to monitor  8.  Hold home Vilazodone (Viibryd)   9.  PRNs: haloperidol (Haldol) 2 mg PO/IM q6h PRN for agitation    ;;07/06: met with patient and her daughter and later with MSW.   Patient slept well with low dose Remeron 7.5mg and staff reported  no behavorial issues but unclear why patient received Haldol last night .  Patient would consider IOP for aftercare.  Need to monitor patient on minimal medications at this time to obtain some sense of baseline and consider need for appropriate antidepressant ; however this may need to ultimately be done as an outpatient.   Not endorsing  suicidal or homicidal ideation intent or plans; no mention of auditory or visual hallucinations .  Alert; oriented; cognition intact; speech clear; no tremor or evidence of movement impairment. Thinking is congruent with affect; no peculiarities of thinking or language use.   ;;07/08:  Patient reports improvement in mood but states that she has experienced increased anxiety at the thought or remaining on [facility].  She reports sleep has improved with olanzapine 2.5 mg PO qhs along with mirtazapine 7.5 mg PO qHs.  The patient denies SI/HI, intent, plan and AVH.   ;;07/10: poor early sleep; raising Olanzepine to 5mg at night; continue neuro w/u;  for transition to IOP.

## 2023-07-11 NOTE — CONSULT NOTE ADULT - ATTENDING COMMENTS
initial presentation consistent with toxic encephalopathy. nothing in chart or by hx to suggest otherwise.  also suboptimal performance on a cognitive screener, so non-urgent outpatient neurology follow up is advised

## 2023-07-11 NOTE — BH INPATIENT PSYCHIATRY PROGRESS NOTE - NSBHFUPINTERVALHXFT_PSY_A_CORE
Patient endorses minimal improvement in sleep  Patient endorses minimal improvement in sleep. Would like to be discharged but also would like to have a structured plan in place. Patient denies any current SI/I/P, HI/I/P,  VH/AH. To be evaluated by neurology.     Spoke to patient's therapist Gabriel (Jordy Gonzalesdorian) (cell , work )- Has known the patient for 12 years however patient stopped going to see her due to therapist not accepting medicare a few months ago. Her therapist would allow her to be charged at a very reduced rate but patient declined further sessions. States that patient has been reclusive, not socializing which is a feature of her depression as she is usually a very social person. Patient has been treated for BDD, BPD, and social anxiety for which patient has taken klonopin for many years. Patient has a fear of aging, and has become upset that she can no longer have cosmetic procedures done due to her anticoagulation rx. Another recent trigger is the graduation of her granddaughter from high school. Therapist has recommended [address] for daily activities for older people but she has not followed through.     Called patient's psychiatrist Dr. Briones [phone]- Says he has been treating the patient for 10 years in person and within the past 6 months has switched to virtual visits every 2 weeks due to patient's fear of leaving her home. He has been concerned regarding patient's depressive symptoms, which are similar to what she has experienced in the past. However he has found that regimens that have worked in the past have not worked recently (effexor, mirtazapine, olanzapine). He notes granddaughter leaving for college is a trigger and believes that patient needs to be followed closely outpatient by a psychiatrist in [place].

## 2023-07-11 NOTE — CONSULT NOTE ADULT - SUBJECTIVE AND OBJECTIVE BOX
Neurology consult    АННА [id number]    HPI: 76 y/o F with PMH of MI (Sept 2022), MDD, anorexia that initially presented with altered mental status and found to have toxic encephalopathy likely 2/2 mirtazapine  Per EMS, she was found at home lethargic with two empty bottles of medication next to her, one unlabeled and one was mirtazapine. She lives alone, and at baseline is able to ambulate without assistance. She denies SI but does report worsening depression in the last few months requiring changes to her meds. Her mental status improved and she was transferred to inpatient psychiatry for voluntary admission for MDD.      MEDICATIONS    aluminum hydroxide/magnesium hydroxide/simethicone Suspension 30 milliLiter(s) Oral every 4 hours PRN  atorvastatin 10 milliGRAM(s) Oral at bedtime  carvedilol 3.125 milliGRAM(s) Oral every 12 hours  cholecalciferol 2000 Unit(s) Oral daily  clopidogrel Tablet 75 milliGRAM(s) Oral daily  diphenhydrAMINE 25 milliGRAM(s) Oral at bedtime  levothyroxine 25 MICROGram(s) Oral daily  melatonin 3 milliGRAM(s) Oral at bedtime  OLANZapine 5 milliGRAM(s) Oral at bedtime  polyethylene glycol 3350 17 Gram(s) Oral at bedtime  sacubitril 24 mG/valsartan 26 mG 1 Tablet(s) Oral <User Schedule>        Family history: No history of dementia, strokes, or seizures   FAMILY HISTORY:    SOCIAL HISTORY -- No history of tobacco or alcohol use     Allergies    codeine (Vomiting)    Intolerances            Vital Signs Last 24 Hrs  T(C): 36.6 (11 Jul 2023 08:55), Max: 36.6 (10 Jul 2023 16:37)  T(F): 97.9 (11 Jul 2023 08:55), Max: 97.9 (10 Jul 2023 16:37)  HR: 93 (11 Jul 2023 08:55) (88 - 93)  BP: 135/73 (11 Jul 2023 08:55) (128/77 - 139/67)  BP(mean): --  RR: 18 (11 Jul 2023 08:55) (18 - 19)  SpO2: 96% (11 Jul 2023 08:55) (96% - 99%)    Parameters below as of 11 Jul 2023 08:55  Patient On (Oxygen Delivery Method): room air          REVIEW OF SYSTEMS:    Constitutional: No fever, chills, fatigue, weakness  Eyes: no eye pain, visual disturbances, or discharge  ENT:  No difficulty hearing, tinnitus, vertigo; No sinus or throat pain  Neck: No pain or stiffness  Respiratory: No cough, dyspnea, wheezing   Cardiovascular: No chest pain, palpitations,   Gastrointestinal: No abdominal or epigastric pain. No nausea, vomiting  No diarrhea or constipation.   Genitourinary: No dysuria, frequency, hematuria or incontinence  Neurological: No headaches, lightheadedness, vertigo, numbness or tremors  Psychiatric: No depression, anxiety, mood swings or difficulty sleeping  Musculoskeletal: No joint pain or swelling; No muscle, back or extremity pain  Skin: No itching, burning, rashes or lesions   Lymph Nodes: No enlarged glands  Endocrine: No heat or cold intolerance; No hair loss, No h/o diabetes or thyroid dysfunction  Allergy and Immunologic: No hives or eczema    On Neurological Examination:    Mental Status - Patient is alert, awake, oriented X3. Confused Dementia Lethargic .     Follows commands well and able to answer questions appropriately. Mood and affect  normal  Follow simple commands  Follow complex commands  Does not follow commands    Speech -   Fluent    Dysarthria  Aphasia                              Cranial Nerves - Pupils 3 mm equal and reactive to light,   extraocular eye movements intact.     Motor Exam -   Right upper  Left upper  Right lower  Left lower     With stimuli positive movement of all 4 extremities    Muscle tone - is normal all over. No asymmetry is seen.      Sensory    Bilateral intact to light touch    Gait -  normal  ataxia     GENERAL Exam:     Nontoxic , No Acute Distress   	  HEENT:  normocephalic, atraumatic  		  LUNGS:	Clear bilaterally  No Wheeze  Decreased bilaterally  	  HEART:	 Normal S1S2   No murmur RRR        	  GI/ ABDOMEN:  Soft  Non tender    EXTREMITIES:   No Edema  No Clubbing  No Cyanosis No Edema    MUSCULOSKELETAL: Normal Range of Motion  	   SKIN:      Normal   No Ecchymosis               LABS:    No recent labs    Hemoglobin A1C:   5.1    Vitamin B12   >2000      RADIOLOGY    < from: CT Head No Cont (07.01.23 @ 12:04) >  FINDINGS: The CT examination demonstrates age-appropriate volume loss.   There is no midline shift or extra axial collections. The gray white   differentiation appears within normal limits. There is no intracranial   hemorrhage or acute transcortical infarct. There are mild patchy areas of   hypodensity within the periventricular white matter which is nonspecific   and may represent the sequela of small vessel ischemic disease in this   patient.      < end of copied text >      EKG    On July 2nd, sinus tachycardia to 101. QTc 458, no ischemic changes.                   Neurology consult    АННА [id number]    HPI: 76 y/o F with PMH of MI (Sept 2022), MDD, anorexia that initially presented with altered mental status and found to have toxic encephalopathy likely 2/2 mirtazapine.  Per EMS, she was found at home lethargic with two empty bottles of medication next to her, one unlabeled and one was mirtazapine. She lives alone, and at baseline is able to ambulate without assistance. She denies SI but does report worsening depression in the last few months requiring changes to her meds. Her mental status improved and she was transferred to inpatient psychiatry for voluntary admission for MDD. She has had low mood, anhedonia, low energy, and poor sleep without SI. She has had issues with word finding and mild short term memory issues as she has gotten older but is not bothered by it. She has not had any dizziness, weakness, seizures, ataxia, urinary incontinence, numbness, tingling. She still does not remember what happened when she was brought to the hospital.      MEDICATIONS    aluminum hydroxide/magnesium hydroxide/simethicone Suspension 30 milliLiter(s) Oral every 4 hours PRN  atorvastatin 10 milliGRAM(s) Oral at bedtime  carvedilol 3.125 milliGRAM(s) Oral every 12 hours  cholecalciferol 2000 Unit(s) Oral daily  clopidogrel Tablet 75 milliGRAM(s) Oral daily  diphenhydrAMINE 25 milliGRAM(s) Oral at bedtime  levothyroxine 25 MICROGram(s) Oral daily  melatonin 3 milliGRAM(s) Oral at bedtime  OLANZapine 5 milliGRAM(s) Oral at bedtime  polyethylene glycol 3350 17 Gram(s) Oral at bedtime  sacubitril 24 mG/valsartan 26 mG 1 Tablet(s) Oral <User Schedule>        Family history: No history of dementia, strokes, or seizures     SOCIAL HISTORY -- She used to have 1 drink at dinner but stopped after her MI last year. She used to smoke approx 3-5 cigarettes per day for 25 years but stopped 25 years ago.  Allergies  codeine (Vomiting)  Intolerances            Vital Signs Last 24 Hrs  T(C): 36.6 (11 Jul 2023 08:55), Max: 36.6 (10 Jul 2023 16:37)  T(F): 97.9 (11 Jul 2023 08:55), Max: 97.9 (10 Jul 2023 16:37)  HR: 93 (11 Jul 2023 08:55) (88 - 93)  BP: 135/73 (11 Jul 2023 08:55) (128/77 - 139/67)  BP(mean): --  RR: 18 (11 Jul 2023 08:55) (18 - 19)  SpO2: 96% (11 Jul 2023 08:55) (96% - 99%)    Parameters below as of 11 Jul 2023 08:55  Patient On (Oxygen Delivery Method): room air          REVIEW OF SYSTEMS:    Constitutional: No fever, chills  Eyes: no eye pain, visual disturbances, or discharge  ENT:  No difficulty hearing, tinnitus, vertigo; No sinus or throat pain  Respiratory: No dyspnea  Cardiovascular: No chest pain   Gastrointestinal: No nausea, vomiting  No diarrhea or constipation.   Genitourinary: No dysuria, frequency, hematuria or incontinence  Neurological: No headaches, lightheadedness, vertigo, numbness or tremors  Psychiatric: + for depression, see above  Musculoskeletal: No joint pain or swelling; No muscle, back or extremity pain    On Neurological Examination:    Mental Status - Patient is alert, awake, oriented X3.   Follows commands well and able to answer questions appropriately. Mood and affect normal  Speech -   Fluent                             Cranial Nerves - Pupils 3 mm equal and reactive to light, cannot raise eyebrows though appears she had botulinum toxin injection, facial nerve intact no droop  extraocular eye movements intact.     Motor Exam -   Right upper 5/5  Left upper 5/5  Right lower 5/5  Left lower 5/5    Muscle tone - is normal all over. No asymmetry is seen.      Sensory    Bilateral intact to light touch    Cerebellar - has intention tremor but finger to nose intact, heel to shin intact    No pronator drift    GENERAL Exam:  awake, comfortable appearing sitting on edge of bed  HEENT:  normocephalic, atraumatic	  LUNGS:	Clear bilaterally  No Wheeze  Decreased bilaterally  HEART:	 Normal S1S2   No murmur RRR        EXTREMITIES:   No Edema, WWP  MUSCULOSKELETAL: Normal Range of Motion  SKIN:      Normal   No Ecchymosis               LABS:    No recent labs    Hemoglobin A1C:   5.1    Vitamin B12   >2000      RADIOLOGY    < from: CT Head No Cont (07.01.23 @ 12:04) >  FINDINGS: The CT examination demonstrates age-appropriate volume loss.   There is no midline shift or extra axial collections. The gray white   differentiation appears within normal limits. There is no intracranial   hemorrhage or acute transcortical infarct. There are mild patchy areas of   hypodensity within the periventricular white matter which is nonspecific   and may represent the sequela of small vessel ischemic disease in this   patient.      < end of copied text >      EKG    On July 2nd, sinus tachycardia to 101. QTc 458, no ischemic changes.                   Neurology consult    АННА [id number]    HPI: 78 y/o F with PMH of MI (Sept 2022), MDD, anorexia that initially presented with altered mental status and found to have toxic encephalopathy likely 2/2 mirtazapine.  Per EMS, she was found at home lethargic with two empty bottles of medication next to her, one unlabeled and one was mirtazapine. She lives alone, and at baseline is able to ambulate without assistance. She denies SI but does report worsening depression in the last few months requiring changes to her meds. Her mental status improved and she was transferred to inpatient psychiatry for voluntary admission for MDD. She has had low mood, anhedonia, low energy, and poor sleep without SI. She has had issues with word finding and mild short term memory issues as she has gotten older but is not bothered by it. She has not had any dizziness, weakness, seizures, ataxia, urinary incontinence, numbness, tingling. She still does not remember what happened when she was brought to the hospital.    MoCA test - 24/30 -mild cognitive impairment    MEDICATIONS    aluminum hydroxide/magnesium hydroxide/simethicone Suspension 30 milliLiter(s) Oral every 4 hours PRN  atorvastatin 10 milliGRAM(s) Oral at bedtime  carvedilol 3.125 milliGRAM(s) Oral every 12 hours  cholecalciferol 2000 Unit(s) Oral daily  clopidogrel Tablet 75 milliGRAM(s) Oral daily  diphenhydrAMINE 25 milliGRAM(s) Oral at bedtime  levothyroxine 25 MICROGram(s) Oral daily  melatonin 3 milliGRAM(s) Oral at bedtime  OLANZapine 5 milliGRAM(s) Oral at bedtime  polyethylene glycol 3350 17 Gram(s) Oral at bedtime  sacubitril 24 mG/valsartan 26 mG 1 Tablet(s) Oral <User Schedule>        Family history: No history of dementia, strokes, or seizures     SOCIAL HISTORY -- She used to have 1 drink at dinner but stopped after her MI last year. She used to smoke approx 3-5 cigarettes per day for 25 years but stopped 25 years ago.  Allergies  codeine (Vomiting)  Intolerances            Vital Signs Last 24 Hrs  T(C): 36.6 (11 Jul 2023 08:55), Max: 36.6 (10 Jul 2023 16:37)  T(F): 97.9 (11 Jul 2023 08:55), Max: 97.9 (10 Jul 2023 16:37)  HR: 93 (11 Jul 2023 08:55) (88 - 93)  BP: 135/73 (11 Jul 2023 08:55) (128/77 - 139/67)  BP(mean): --  RR: 18 (11 Jul 2023 08:55) (18 - 19)  SpO2: 96% (11 Jul 2023 08:55) (96% - 99%)    Parameters below as of 11 Jul 2023 08:55  Patient On (Oxygen Delivery Method): room air          REVIEW OF SYSTEMS:    Constitutional: No fever, chills  Eyes: no eye pain, visual disturbances, or discharge  ENT:  No difficulty hearing, tinnitus, vertigo; No sinus or throat pain  Respiratory: No dyspnea  Cardiovascular: No chest pain   Gastrointestinal: No nausea, vomiting  No diarrhea or constipation.   Genitourinary: No dysuria, frequency, hematuria or incontinence  Neurological: No headaches, lightheadedness, vertigo, numbness or tremors  Psychiatric: + for depression, see above  Musculoskeletal: No joint pain or swelling; No muscle, back or extremity pain    On Neurological Examination:    Mental Status - Patient is alert, awake, oriented X3.   Follows commands well and able to answer questions appropriately. Mood and affect normal  Speech -   Fluent                             Cranial Nerves - Pupils 3 mm equal and reactive to light, cannot raise eyebrows though appears she had botulinum toxin injection, facial nerve intact no droop  extraocular eye movements intact.     Motor Exam -   Right upper 5/5  Left upper 5/5  Right lower 5/5  Left lower 5/5    Muscle tone - is normal all over. No asymmetry is seen.      Sensory    Bilateral intact to light touch    Cerebellar - has intention tremor but finger to nose intact, heel to shin intact    No pronator drift    GENERAL Exam:  awake, comfortable appearing sitting on edge of bed  HEENT:  normocephalic, atraumatic	  LUNGS:	Clear bilaterally  No Wheeze  Decreased bilaterally  HEART:	 Normal S1S2   No murmur RRR        EXTREMITIES:   No Edema, WWP  MUSCULOSKELETAL: Normal Range of Motion  SKIN:      Normal   No Ecchymosis               LABS:    No recent labs    Hemoglobin A1C:   5.1    Vitamin B12   >2000      RADIOLOGY    < from: CT Head No Cont (07.01.23 @ 12:04) >  FINDINGS: The CT examination demonstrates age-appropriate volume loss.   There is no midline shift or extra axial collections. The gray white   differentiation appears within normal limits. There is no intracranial   hemorrhage or acute transcortical infarct. There are mild patchy areas of   hypodensity within the periventricular white matter which is nonspecific   and may represent the sequela of small vessel ischemic disease in this   patient.      < end of copied text >      EKG    On July 2nd, sinus tachycardia to 101. QTc 458, no ischemic changes.

## 2023-07-11 NOTE — CONSULT NOTE ADULT - ASSESSMENT
INCOMPLETE 76 y/o F with PMH of MI (Sept 2022), MDD, anorexia that initially presented with altered mental status and found to have toxic encephalopathy likely 2/2 mirtazapine then transferred to inpatient psychiatry for MDD. Neurology consulted to comment on whether or not there is a neuro etiology for her AMS.    #Encephalopathy  Likely toxic encephalopathy 2/2 mirtazapine overdose as she was found w an empty bottle and improved w supportive care. She is now back to baseline. She did not have any major metabolic derangements, trauma, infection, focal neuro deficits, observed seizure, B12 deficiency, hypoxia. She did not have any signs, symptoms, or imaging findings consistent w CVA. She has symptoms consistent w MDD. She does not report any issues w long term memory. She has had issues w short term memory and word finding over several years but is not bothered by it. She does not have any ataxia or urinary incontinence consistent w NPH or stepwise decline consistent w vascular dementia. MoCA would better evaluate/screen for cognitive impairment.   - administer MoCA  -if MoCA abnormal would do outpatient neuropsychological testing to better evaluate cognitive function and short term memory loss  - outpatient follow up with neurology  - rest of care per primary team    Recommendations not final until attending attestation 78 y/o F with PMH of MI (Sept 2022), MDD, anorexia that initially presented with altered mental status and found to have toxic encephalopathy likely 2/2 mirtazapine then transferred to inpatient psychiatry for MDD. Neurology consulted to comment on whether or not there is a neuro etiology for her AMS.    #Encephalopathy  Likely toxic encephalopathy 2/2 mirtazapine overdose as she was found w an empty bottle and improved w supportive care. She is now back to baseline. She did not have any major metabolic derangements, trauma, infection, focal neuro deficits, observed seizure, B12 deficiency, hypoxia. She did not have any signs, symptoms, or imaging findings consistent w CVA. She has symptoms consistent w MDD. She does not report any issues w long term memory. She has had issues w short term memory and word finding over several years but is not bothered by it. She does not have any ataxia or urinary incontinence consistent w NPH or stepwise decline consistent w vascular dementia. MoCA score was 24/30 which is at the upper limit of mild cognitive impairment with deficits in visuospatial/executive function, delayed recall and attention.  - non urgent outpatient follow up with neurology for evaluation of cognitive impairment  - rest of care per primary team    Recommendations not final until attending attestation

## 2023-07-11 NOTE — CHART NOTE - NSCHARTNOTEFT_GEN_A_CORE
Admitting Diagnosis:   Patient is a 77y old  Female who presents with a chief complaint of UNABLE TO ABLULATE    MAJOR DEPRESSIVE DISORDER     (06 Jul 2023 12:11)      PAST MEDICAL & SURGICAL HISTORY:  HTN (hypertension)  HLD (hyperlipidemia)  CAD (coronary artery disease)  Myocardial infarct  Major depression  Anorexia  No significant past surgical history      Current Nutrition Order: DASH/TLC       PO Intake: Good (%) [   ]  Fair (50-75%) [   ] Poor (<25%) [ x ]    GI Issues: Reports constipation, last BM 7/10 per EMR.     Skin Integrity: No pressure injuries or edema documented.     Labs: No new labs obtained since admit         Medications:  MEDICATIONS  (STANDING):  atorvastatin 10 milliGRAM(s) Oral at bedtime  carvedilol 3.125 milliGRAM(s) Oral every 12 hours  cholecalciferol 2000 Unit(s) Oral daily  clopidogrel Tablet 75 milliGRAM(s) Oral daily  diphenhydrAMINE 25 milliGRAM(s) Oral at bedtime  levothyroxine 25 MICROGram(s) Oral daily  melatonin 3 milliGRAM(s) Oral at bedtime  OLANZapine 5 milliGRAM(s) Oral at bedtime  polyethylene glycol 3350 17 Gram(s) Oral at bedtime  sacubitril 24 mG/valsartan 26 mG 1 Tablet(s) Oral <User Schedule>    MEDICATIONS  (PRN):  aluminum hydroxide/magnesium hydroxide/simethicone Suspension 30 milliLiter(s) Oral every 4 hours PRN Dyspepsia      Anthropometrics:  Dosing height: 66in  Dosing weight: 43.1kg/95#  BMI: 15.3    Weight Change: 41.7kg/92# on 7/11    Nutrition Focused Physical Exam: Completed [ x ]  Not Pertinent [   ]  >>See initial nutrition assessment 7/6 for full NFPE    Estimated energy needs:   Weight used for calculations	IBW  Estimated Energy Needs Weight (lbs)	130 lb  Estimated Energy Needs Weight (kg)	58.9 kg  Estimated Energy Needs From (angel/kg)	27  Estimated Energy Needs To (angel/kg)	33  Estimated Energy Needs Calculated From (angel/kg)	1590  Estimated Energy Needs Calculated To (angel/kg)	1943  Weight used for calculations	IBW  Estimated Protein Needs Weight (lbs)	130 lb  Estimated Protein Needs Weight (kg)	58.9 kg  Estimated Protein Needs From (g/kg)	1  Estimated Protein Needs To (g/kg)	1.4  Estimated Protein Needs Calculated From (g/kg)	58.9  Estimated Protein Needs Calculated To (g/kg)	82.46  Estimated Fluid Needs Weight (lbs)	130 lb  Estimated Fluid Needs Weight (kg)	58.9 kg  Estimated Fluid Needs From (ml/kg)	27  Estimated Fluid Needs To (ml/kg)	33  Estimated Fluid Needs Calculated From (ml/kg)	1590  Estimated Fluid Needs Calculated To (ml/kg)	1943    Other Calculations	Based on Standards of Care pt below % IBW (73%) thus ideal body weight used for all calculations (130#). Needs adjusted for advanced age and malnutrition.    Subjective:   77 year old female, , retired, domiciled alone with PMH HTN, HLD, CAD, history of MI (September 2022) and PPH depression, anxiety, agoraphobia, anorexia nervosa (patient denies diagnosis), Cluster B Personality Traits, history of one prior inpatient psychiatric hospitalization at [facility] in 2016 for depression, currently engaged in outpatient psychiatric treatment with a [facility] licensed psychiatrist, no history of SA/NSSIB/Violence, no history of trauma, who presented with AMS after being found lethargic at home by EMS with empty bottles of medications and initial suspicion of intentional overdose, although the patient adamantly denies it was a suicide attempt.    Pt seen on 8UR for follow up assessment. Continues on DASH/TLC diet with poor PO intake related to lack of appetite. Per pt, consumed only a few bites of egg whites and fruit for breakfast this AM. Noted with 3#/3% weight loss x 1 week. Asked pt for food preferences, however denied to name any. Will reattempt to gather food prefs at next follow up. Reports constipation, last BM 7/10 per EMR. Educated pt on adequate PO intake in view of underweight status/malnutrition and to regulate bowel movements, expressed understanding. No pressure injuries or edema documented. No new labs obtained since admit. Meds: bowel reigmen, vitamin D3. See nutrition recommendations below.     Previous Nutrition Diagnosis: Severe PCM in context of chronic illness related to inadequate energy intake AEB severe muscle/fat loss, meeting <75% nutrition needs >1 month    Active [ x ]  Resolved [   ]    If resolved, new PES:     Goal: Pt to meet at least 75% of nutritional needs consistently     Recommendations:  1. Recommend liberalize diet to Regular, add Ensure Enlive 3x/day (350kcal, 20g protein per serving)    2. Consider appetite stimulant   3. Encourage pt to meed nutritional needs as able   4. Monitor labs: electrolytes, cmp  5. Monitor weights   6. Pain and bowel regimen per team   7. Will continue to assess/honor food preferences as able  8. Monitor adherence to diet education   *discussed with team     Risk Level: High [ x ] Moderate [   ] Low [   ]

## 2023-07-11 NOTE — BH INPATIENT PSYCHIATRY PROGRESS NOTE - NSBHMETABOLIC_PSY_ALL_CORE_FT
BMI: BMI (kg/m2): 15.3 (07-03-23 @ 20:44)  HbA1c: A1C with Estimated Average Glucose Result: 5.1 % (07-06-23 @ 05:30)    Glucose: POCT Blood Glucose.: 114 mg/dL (07-01-23 @ 15:50)    BP: 128/77 (07-10-23 @ 21:44) (102/68 - 156/85)  Lipid Panel: Date/Time: 07-06-23 @ 05:30  Cholesterol, Serum: 140  Direct LDL: --  HDL Cholesterol, Serum: 53  Total Cholesterol/HDL Ration Measurement: --  Triglycerides, Serum: 112   BMI: BMI (kg/m2): 15.3 (07-03-23 @ 20:44)  HbA1c: A1C with Estimated Average Glucose Result: 5.1 % (07-06-23 @ 05:30)    Glucose: POCT Blood Glucose.: 114 mg/dL (07-01-23 @ 15:50)    BP: 135/73 (07-11-23 @ 08:55) (102/68 - 156/85)  Lipid Panel: Date/Time: 07-06-23 @ 05:30  Cholesterol, Serum: 140  Direct LDL: --  HDL Cholesterol, Serum: 53  Total Cholesterol/HDL Ration Measurement: --  Triglycerides, Serum: 112   BMI: BMI (kg/m2): 15.3 (07-03-23 @ 20:44)  HbA1c: A1C with Estimated Average Glucose Result: 5.1 % (07-06-23 @ 05:30)    Glucose: POCT Blood Glucose.: 114 mg/dL (07-01-23 @ 15:50)    BP: 146/66 (07-11-23 @ 16:58) (102/68 - 156/85)  Lipid Panel: Date/Time: 07-06-23 @ 05:30  Cholesterol, Serum: 140  Direct LDL: --  HDL Cholesterol, Serum: 53  Total Cholesterol/HDL Ration Measurement: --  Triglycerides, Serum: 112

## 2023-07-11 NOTE — BH CHART NOTE - NSNOTETYPE_PSY_ALL_CORE
"09/01/2020  Foot and Ankle Surgery - Established Patient/Follow-up  Provider: Dr. Sarbjit Contreras DPM  Location: [facility]    Subjective:  Rose Pardo is a 56 y.o. male.     Chief Complaint   Patient presents with   • Left Foot - Follow-up       HPI: Patient returns for follow-up regarding plantar fibromatosis.  He states that he has noticed significant improvement with the inserts.  He is able to perform normal daily activities without any significant pain or limitation.  He feels 80% better overall.  No other issues today.    No Known Allergies    Current Outpatient Medications on File Prior to Visit   Medication Sig Dispense Refill   • aspirin 81 MG chewable tablet Chew.     • Chromium 1000 MCG tablet Take  by mouth.     • Vitamin D, Cholecalciferol, (CHOLECALCIFEROL) 10 MCG (400 UNIT) tablet Take 400 Units by mouth Daily.     • Zinc 100 MG tablet Take  by mouth.       No current facility-administered medications on file prior to visit.        Objective   /87   Pulse 73   Ht 188 cm (74\")   Wt 109 kg (240 lb)   BMI 30.81 kg/m²     General:   Appearance: appears stated age and healthy    Orientation: AAOx3    Affect: appropriate    Gait: unimpaired       VASCULAR       Left Foot Vascularity   Normal vascular exam    Dorsalis pedis:  2+  Posterior tibial:  2+  Skin Temperature: warm    Edema Grading:  None  CFT:  < 3 seconds  Pedal Hair Growth:  Present  Varicosities: none        NEUROLOGIC      Left Foot Neurologic   Light touch sensation:  Normal  Hot/cold sensation: normal    Achilles reflex:  2+      MUSCULOSKELETAL       Left Foot Musculoskeletal   Ecchymosis:  None  Tenderness: lesser metatarsophalangeal joints, plantar fascia and arch    Arch:  Normal      MUSCLE STRENGTH      Left Foot Muscle Strength   Normal strength    Foot dorsiflexion:  5  Foot plantar flexion:  5  Foot inversion:  5  Foot eversion:  5      DERMATOLOGIC      Left Foot Dermatologic   Skin: skin intact  "     Assessment/Plan   Rose was seen today for follow-up.    Diagnoses and all orders for this visit:    Foot pain, left  -     XR Foot 3+ View Left    Plantar fascial fibromatosis of left foot    Metatarsalgia, left foot      Patient is doing quite well at this time.  He has noticed substantial improvement with the inserts.  I have asked that he continue to wear the inserts and perform at home exercises.  He does remain at a disadvantage with the Dupuytren's contracture.  He may remain tight and eventually require endoscopic plantar fasciotomy for definitive management; however, at this time I would like to see him on an as-needed basis.  He is to call with any additional questions or concerns.    Orders Placed This Encounter   Procedures   • XR Foot 3+ View Left     RM 14     Order Specific Question:   Reason for Exam:     Answer:   foot pain     Order Specific Question:   Does this patient have a diabetic monitoring/medication delivering device on?     Answer:   No          Note is dictated utilizing voice recognition software. Unfortunately this leads to occasional typographical errors. I apologize in advance if the situation occurs. If questions occur please do not hesitate to call our office.   Event Note

## 2023-07-12 DIAGNOSIS — T43.021A POISONING BY TETRACYCLIC ANTIDEPRESSANTS, ACCIDENTAL (UNINTENTIONAL), INITIAL ENCOUNTER: ICD-10-CM

## 2023-07-12 DIAGNOSIS — Z88.5 ALLERGY STATUS TO NARCOTIC AGENT: ICD-10-CM

## 2023-07-12 DIAGNOSIS — I25.2 OLD MYOCARDIAL INFARCTION: ICD-10-CM

## 2023-07-12 DIAGNOSIS — Z20.822 CONTACT WITH AND (SUSPECTED) EXPOSURE TO COVID-19: ICD-10-CM

## 2023-07-12 DIAGNOSIS — E43 UNSPECIFIED SEVERE PROTEIN-CALORIE MALNUTRITION: ICD-10-CM

## 2023-07-12 DIAGNOSIS — I48.91 UNSPECIFIED ATRIAL FIBRILLATION: ICD-10-CM

## 2023-07-12 DIAGNOSIS — F32.9 MAJOR DEPRESSIVE DISORDER, SINGLE EPISODE, UNSPECIFIED: ICD-10-CM

## 2023-07-12 DIAGNOSIS — R63.0 ANOREXIA: ICD-10-CM

## 2023-07-12 DIAGNOSIS — R13.10 DYSPHAGIA, UNSPECIFIED: ICD-10-CM

## 2023-07-12 DIAGNOSIS — I10 ESSENTIAL (PRIMARY) HYPERTENSION: ICD-10-CM

## 2023-07-12 DIAGNOSIS — G47.00 INSOMNIA, UNSPECIFIED: ICD-10-CM

## 2023-07-12 DIAGNOSIS — E03.9 HYPOTHYROIDISM, UNSPECIFIED: ICD-10-CM

## 2023-07-12 DIAGNOSIS — E78.5 HYPERLIPIDEMIA, UNSPECIFIED: ICD-10-CM

## 2023-07-12 DIAGNOSIS — R00.0 TACHYCARDIA, UNSPECIFIED: ICD-10-CM

## 2023-07-12 DIAGNOSIS — I25.10 ATHEROSCLEROTIC HEART DISEASE OF NATIVE CORONARY ARTERY WITHOUT ANGINA PECTORIS: ICD-10-CM

## 2023-07-12 DIAGNOSIS — G92.8 OTHER TOXIC ENCEPHALOPATHY: ICD-10-CM

## 2023-07-12 LAB
HCT VFR BLD CALC: 35.3 % — SIGNIFICANT CHANGE UP (ref 34.5–45)
HGB BLD-MCNC: 11.5 G/DL — SIGNIFICANT CHANGE UP (ref 11.5–15.5)
MCHC RBC-ENTMCNC: 31.9 PG — SIGNIFICANT CHANGE UP (ref 27–34)
MCHC RBC-ENTMCNC: 32.6 GM/DL — SIGNIFICANT CHANGE UP (ref 32–36)
MCV RBC AUTO: 97.8 FL — SIGNIFICANT CHANGE UP (ref 80–100)
NRBC # BLD: 0 /100 WBCS — SIGNIFICANT CHANGE UP (ref 0–0)
PLATELET # BLD AUTO: 427 K/UL — HIGH (ref 150–400)
RBC # BLD: 3.61 M/UL — LOW (ref 3.8–5.2)
RBC # FLD: 12.7 % — SIGNIFICANT CHANGE UP (ref 10.3–14.5)
WBC # BLD: 6.56 K/UL — SIGNIFICANT CHANGE UP (ref 3.8–10.5)
WBC # FLD AUTO: 6.56 K/UL — SIGNIFICANT CHANGE UP (ref 3.8–10.5)

## 2023-07-12 PROCEDURE — 99232 SBSQ HOSP IP/OBS MODERATE 35: CPT

## 2023-07-12 PROCEDURE — 99221 1ST HOSP IP/OBS SF/LOW 40: CPT

## 2023-07-12 RX ORDER — HYDROXYZINE HCL 10 MG
25 TABLET ORAL ONCE
Refills: 0 | Status: COMPLETED | OUTPATIENT
Start: 2023-07-12 | End: 2023-07-12

## 2023-07-12 RX ORDER — GABAPENTIN 400 MG/1
300 CAPSULE ORAL ONCE
Refills: 0 | Status: COMPLETED | OUTPATIENT
Start: 2023-07-12 | End: 2023-07-12

## 2023-07-12 RX ORDER — ACETAMINOPHEN 500 MG
650 TABLET ORAL EVERY 6 HOURS
Refills: 0 | Status: DISCONTINUED | OUTPATIENT
Start: 2023-07-12 | End: 2023-07-17

## 2023-07-12 RX ADMIN — Medication 25 MICROGRAM(S): at 07:06

## 2023-07-12 RX ADMIN — SACUBITRIL AND VALSARTAN 1 TABLET(S): 24; 26 TABLET, FILM COATED ORAL at 10:12

## 2023-07-12 RX ADMIN — Medication 3 MILLIGRAM(S): at 21:27

## 2023-07-12 RX ADMIN — CARVEDILOL PHOSPHATE 3.12 MILLIGRAM(S): 80 CAPSULE, EXTENDED RELEASE ORAL at 21:27

## 2023-07-12 RX ADMIN — OLANZAPINE 5 MILLIGRAM(S): 15 TABLET, FILM COATED ORAL at 21:27

## 2023-07-12 RX ADMIN — CARVEDILOL PHOSPHATE 3.12 MILLIGRAM(S): 80 CAPSULE, EXTENDED RELEASE ORAL at 10:11

## 2023-07-12 RX ADMIN — Medication 2000 UNIT(S): at 10:11

## 2023-07-12 RX ADMIN — CLOPIDOGREL BISULFATE 75 MILLIGRAM(S): 75 TABLET, FILM COATED ORAL at 10:11

## 2023-07-12 RX ADMIN — ATORVASTATIN CALCIUM 10 MILLIGRAM(S): 80 TABLET, FILM COATED ORAL at 21:27

## 2023-07-12 RX ADMIN — Medication 25 MILLIGRAM(S): at 21:26

## 2023-07-12 RX ADMIN — GABAPENTIN 300 MILLIGRAM(S): 400 CAPSULE ORAL at 23:08

## 2023-07-12 RX ADMIN — Medication 25 MILLIGRAM(S): at 18:35

## 2023-07-12 NOTE — BH TREATMENT PLAN - NSTXDCOPLKGOALOTHER_PSY_ALL_CORE
Patient will explore psychosocial issues and barriers to discharge with  while engaging in discharge planning for 30 minutes per week.

## 2023-07-12 NOTE — BH INPATIENT PSYCHIATRY DISCHARGE NOTE - NSBHMETABOLIC_PSY_ALL_CORE_FT
BMI: BMI (kg/m2): 15.3 (07-03-23 @ 20:44)  HbA1c: A1C with Estimated Average Glucose Result: 5.1 % (07-06-23 @ 05:30)    Glucose: POCT Blood Glucose.: 114 mg/dL (07-01-23 @ 15:50)    BP: 120/70 (07-12-23 @ 09:05) (115/62 - 156/85)  Lipid Panel: Date/Time: 07-06-23 @ 05:30  Cholesterol, Serum: 140  Direct LDL: --  HDL Cholesterol, Serum: 53  Total Cholesterol/HDL Ration Measurement: --  Triglycerides, Serum: 112

## 2023-07-12 NOTE — BH TREATMENT PLAN - NSTXCOPEINTERRN_PSY_ALL_CORE
Assist with accurate evaluation of situation; encourage focus on the present.

## 2023-07-12 NOTE — BH TREATMENT PLAN - NSTXCAREGIVERPARTICIPATE_PSY_P_CORE
Family/Caregiver participated in identification of needs/problems/goals for treatment
Family/Caregiver participated in identification of needs/problems/goals for treatment/Family/Caregiver participated in defining interventions
Family/Caregiver participated in identification of needs/problems/goals for treatment/Family/Caregiver participated in defining interventions

## 2023-07-12 NOTE — BH TREATMENT PLAN - NSCMSPTSTRENGTHS_PSY_ALL_CORE
Able to adapt/Compliance to treatment/Future/goal oriented/Highly motivated for treatment/Interpersonal skills/Motivated/Positive attitude/Resourceful/Supportive family

## 2023-07-12 NOTE — BH INPATIENT PSYCHIATRY DISCHARGE NOTE - OTHER PAST PSYCHIATRIC HISTORY (INCLUDE DETAILS REGARDING ONSET, COURSE OF ILLNESS, INPATIENT/OUTPATIENT TREATMENT)
77 year old female, , retired, domiciled alone with PMH HTN, HLD, CAD, history of MI (September 2022) and PPH depression, anxiety, agoraphobia, anorexia nervosa (patient denies diagnosis), Cluster B Personality Traits, history of one prior inpatient psychiatric hospitalization at [facility] in 2016 for depression, currently engaged in outpatient psychiatric treatment with a [facility] licensed psychiatrist, no history of SA/NSSIB/Violence, no history of trauma, who presented with AMS after being found lethargic at home by EMS with empty bottles of medications and initial suspicion of intentional overdose, although the patient adamantly denies it was a suicide attempt.

## 2023-07-12 NOTE — BH TREATMENT PLAN - NSTXDCOPLKINTERSW_PSY_ALL_CORE
Liaise with family and collateral providers weekly for 1 hour or as needed for discharge planning purposes.

## 2023-07-12 NOTE — BH INPATIENT PSYCHIATRY DISCHARGE NOTE - NSBHDCMEDICALFT_PSY_A_CORE
Patient also developed a bruise on the right groin area and was evaluated by medicine service. Appeared to be a right groin hematoma, likely from a prior incident. Lab work (platelets, renal function) was normal and aside from careful monitoring, no further intervention was necessary.

## 2023-07-12 NOTE — BH INPATIENT PSYCHIATRY DISCHARGE NOTE - ATTENDING DISCHARGE PHYSICAL EXAMINATION:
;;07/17: preoccupied with prescriptions; c/o tremor but no gross tremor; no cogwheeling; no tongue fasciulations; no lip smacking;  Alert; oriented; cognition intact; speech clear; no tremor or evidence of movement impairment. anxious but Not endorsing  suicidal or homicidal ideation intent or plans; no mention of auditory or visual hallucinations .  Future oriented; looks forward to going home.

## 2023-07-12 NOTE — BH PSYCHOLOGY - CLINICIAN PSYCHOTHERAPY NOTE - TOKEN PULL-DIAGNOSIS
Primary Diagnosis:  MDD (major depressive disorder), recurrent episode [F33.9]        Problem Dx:   Agoraphobia [F40.00]      
Primary Diagnosis:  MDD (major depressive disorder), recurrent episode [F33.9]        Problem Dx:   Agoraphobia [F40.00]

## 2023-07-12 NOTE — BH INPATIENT PSYCHIATRY DISCHARGE NOTE - HPI (INCLUDE ILLNESS QUALITY, SEVERITY, DURATION, TIMING, CONTEXT, MODIFYING FACTORS, ASSOCIATED SIGNS AND SYMPTOMS)
77 year old female, , retired, domiciled alone with PMH HTN, HLD, CAD, history of MI (September 2022) and PPH depression, anxiety, agoraphobia, anorexia nervosa (patient denies diagnosis), Cluster B Personality Traits, history of one prior inpatient psychiatric hospitalization at [facility] in 2016 for depression, currently engaged in outpatient psychiatric treatment with a [facility] licensed psychiatrist, no history of SA/NSSIB/Violence, no history of trauma, who presented with AMS after being found lethargic at home by EMS with empty bottles of medications and initial suspicion of intentional overdose, although the patient adamantly denies it was a suicide attempt.  The patient was assessed and followed by  Psychiatry who determined that overdose was likely unintentional and secondary to recent medication changes and polypharmacy.      On evaluation, the patient is calm, cooperative, polite and pleasant with good eye contact, well-related, euthymic affect and demonstrating good behavioral control and linear thought processes.  The patient reports that she was admitted to [facility] to "have [her] medications straightened out."  She states that, unbeknownst to her, she was found "passed out" by EMS after her son and best friend activated 911 when they could not get in contact with her.  She states that she "may have taken an extra Klonopin" and that she "screwed up terribly."  The patient adamantly denies that it was a suicide attempt.  The patient reports worsening depressive symptoms over the past several months secondary to the mourning of her 's death thirteen years ago and feelings of loneliness given that "all of [her] friends are ."  She reports daily low mood, anhedonia, worsening agoraphobia, poor appetite with associated weight loss since her MI in September 2022 (denies intentional weight loss) and hopelessness.  The patient reports that her energy and attention/concentration are okay and she adamantly denies SI/HI, intent, plan and AVH.  No paranoia or delusions are reported or elicited.  She states that she is currently in psychiatric treatment with a psychiatrist in [place] who prescribes her mirtazapine 60 mg PO qHs, Viibryd, Olanzapine 20 mg PO qHs and clonazepam (Klonopin) 0.5 - 1 mg PO daily PRN.  She states that she feels overwhelmed by polypharmacy and states that she likely unintentionally overdosed on her prescribed medications.  The patient states that she is agreeable to her current inpatient psychiatric hospitalization.  The patient adamantly denies SI/HI, intent and plan.  All questions and concerns addressed.      Per [facility] Assessment Note written by Dr. Raymond Arevalo on 07/02/23:  "Per collateral from the patient's son & son's wife (pt's daughter-in-law): Reports that patient has been experiencing severe & debilitating depression and anxiety starting around the time of her MI one year ago, getting worse & more impairing starting December of 2022 (pt has longstanding history prior to that though, with inpatient psych hospitalization >5 years ago & history of SSRI tx years ago). State that patient has become increasingly isolative and barely leaves the home now. Give example of patient being unable to attend beloved granddaughter's graduation ceremony due to depression/isolative behavior, even though the ceremony was closeby. Report that patient frequently makes statements expressing how depressed and hopeless she feels. Also report that patient exhibits generalized and debilitating levels of anxiety about a wide array of triggers (such as phone calls). They are unsure whether what occurred last night was a suicide attempt; state that it's possible that patient may have forgotten that she had taken her meds or been confused, and then taken more meds. Deny any known suicide attempts in the past. Deny having heard patient make any suicidal statements in the past. Report that part of the patient's pattern of anxiety is frequently contacting her psychiatrist and endorsing distress, which has resulted in frequent medication changes. They state that the patient's outpatient psychiatrist has also stated concern that patient require inpatient care (even prior to the current ingestion event) and has also brought up the potential for ECT given the severity of the patient's symptoms. They also express concern about the patient's low weight and state that she barely eats; are unable to provide further detail regarding the patient's food intake as patient lives alone.     Patient's psychiatrist is Dr. Briones [phone]  Patient's therapist (as of two weeks ago) is Gabriel [phone]"

## 2023-07-12 NOTE — BH INPATIENT PSYCHIATRY DISCHARGE NOTE - NSDCMRMEDTOKEN_GEN_ALL_CORE_FT
atorvastatin 10 mg oral tablet: 1 orally once a day  carvedilol 3.125 mg oral tablet: 1 orally 2 times a day  clopidogrel 75 mg oral tablet: 1 orally once a day  Entresto 24 mg-26 mg oral tablet: 1 orally 2 times a day  KlonoPIN 0.5 mg oral tablet: 0.5 tab(s) orally once a day (at bedtime) as needed for  anxiety max dose of 1 per day  levothyroxine 25 mcg (0.025 mg) oral capsule: 1 orally once a day  mirtazapine 30 mg oral tablet: 2 tab(s) orally once a day (at bedtime)  polyethylene glycol 3350 oral powder for reconstitution: 17 gram(s) orally every 12 hours  PriLOSEC:   senna leaf extract oral tablet: 2 tab(s) orally once a day (at bedtime)  Vitamin D3 50 mcg (2000 intl units) oral capsule: 1 orally once a day   atorvastatin 10 mg oral tablet: 1 tab(s) orally once a day (at bedtime)  carvedilol 3.125 mg oral tablet: 1 tablet orally 2 times a day  clopidogrel 75 mg oral tablet: 1 tab(s) orally once a day  diphenhydrAMINE 25 mg oral capsule: 1 cap(s) orally once a day (at bedtime)  gabapentin 100 mg oral capsule: 1 cap(s) orally once a day (at bedtime)  levothyroxine 25 mcg (0.025 mg) oral tablet: 1 tab(s) orally once a day  OLANZapine 5 mg oral tablet: 1 tab(s) orally once a day (at bedtime)  sacubitril-valsartan 24 mg-26 mg oral tablet: 1 tab(s) orally  Vitamin D3 50 mcg (2000 intl units) oral capsule: 1 capsule orally once a day   atorvastatin 10 mg oral tablet: 1 tab(s) orally once a day (at bedtime)  carvedilol 3.125 mg oral tablet: 1 tab(s) orally 2 times a day  clopidogrel 75 mg oral tablet: 1 tab(s) orally once a day  diphenhydrAMINE 25 mg oral capsule: 1 cap(s) orally once a day (at bedtime)  gabapentin 100 mg oral capsule: 1 cap(s) orally once a day (at bedtime)  levothyroxine 25 mcg (0.025 mg) oral tablet: 1 tab(s) orally once a day  OLANZapine 5 mg oral tablet: 1 tab(s) orally once a day (at bedtime)  sacubitril-valsartan 24 mg-26 mg oral tablet: 1 tab(s) orally once a day  Vitamin D3 50 mcg (2000 intl units) oral capsule: 1 cap(s) orally once a day

## 2023-07-12 NOTE — BH INPATIENT PSYCHIATRY DISCHARGE NOTE - NSBHFUPINTERVALHXFT_PSY_A_CORE
Patient seen in morning. Reports being excited to leave the hospital today. Her son Benjamin will be picking her up at 3 pm and her  will be at her home to meet her. Patient will also start using a pill box at home.  Patient looking forward to her outpatient psychiatry intake appointment tomorrow at 9:30 am. Patient denies any SI/I/P, HI/I/P, VH and AH.

## 2023-07-12 NOTE — BH TREATMENT PLAN - NSTXDEPRESINTERMD_PSY_ALL_CORE
mirtazapine Zyprexa and possibly Zoloft  psychopharmacology 15 minutes a day   

## 2023-07-12 NOTE — BH TREATMENT PLAN - NSTXCOPEINTERPR_PSY_ALL_CORE
Pt will be invited and encouraged to attend all offered groups
Pt will continue to be invited and encouraged to attend all offered groups
Pt will be invited and encouraged to attend all offered groups
Pt will continue to be invited and encouraged to attend all offered groups

## 2023-07-12 NOTE — BH PSYCHOLOGY - CLINICIAN PSYCHOTHERAPY NOTE - NSBHPSYCHOLNARRATIVE_PSY_A_CORE FT
Ms. Gonsales is a 77 year old , retired cisgender woman domiciled alone in [place] with a previous history of inpatient hospitalization for depression in 2016  and a hx of depression, anxiety, agoraphobia, anorexia nervosa (patient denies diagnosis), Cluster B Personality Traits, and no hx of SA/NSSIB/Violence, or trauma. This writer met with Ms. Gonsales for supportive psychotherapy and to complete the initial CAMS evaluation.    Ms. Gonsales reported that she had no hx of insomnia prior to being hospitalized on this inpatient unit. She reported feeling anxiety about what is after discharge and said she feels anxious frequently in her day to day life. She appeared disoriented at times and did not further articulate specific anxious fears or depressive symptoms. She also reported that she has been having difficulty remembering things, particularly things that have happened recently. She repeatedly noted concern about these cognitive issues and expressed an interest in a full neuropsychological evaluation in order to better understand her cognitive functioning, upon discharge.    Ms. Gonsales also repeatedly expressed a desire for a group psychotherapy treatment program occurring at least several times week that she can participate in so that she might have a “focal point” and something to do during the day that will make her feel more “productive” and “independent.” She expressed anxiety about not having an appropriate referral in place yet. Though Ms. Gonsales seemed fully oriented, she appeared confused at times and her speech was repetitive and circumstantial (her thoughts often led back to the need for group therapy treatment).    She reported having an individual psychotherapist for twenty years whom she sees in-person and will continue seeing (this is not the same person who prescribes her medication). This writer provided validation and supportive listening. This writer also worked with Ms. Gonsales to develop a plan to help her feel more purposeful after discharge in the absence of the group therapy referral she desires; making appointments, scheduling therapy sessions, and creating plans with friends were discussed. During todays session, Ms. Gonsales denied any suicidal ideation, was active in thinking about things that would be helpful for her upon discharge, such as going to group.   MENTAL STATUS EXAM    APPEARANCE:  [x] adequately groomed [] disheveled [] malodorous [] Other:   BEHAVIOR: [x] cooperative [] uncooperative [] good EC [] poor EC [] well related [] oddly related [] guarded []PMA [] PMR []abnormal movements [] Other:   SPEED: [x] normal rate/rhythm/volume [] loud [] quiet [] slow [] rapid [] pressured [] Other: _________   MOOD: [] euthymic [] dysphoric [] anxious [] irritable [] Other: Ms. Gonsales reported feeling anxious  AFFECT: [x] full [] expansive [] constricted [] blunted [] flat [] stable [] labile [] Other: ________________ THOUGHT PROCESS: [] organized [] disorganized [] goal-directed [] concrete [] logical [] illogical   [x] circumstantial [] tangential [] impoverished [] effusive [] repetitive [] Other:  THOUGHT CONTENT: [x] negative for delusions/suicidal ideation /homicidal ideation [] positive for delusions/suicidal ideation/homicidal ideation Describe:   PERCEPTION: [x] negative for auditory/ visual hallucinations [] positive for auditory/ visual hallucinations Describe: ________________________________________________________   INSIGHT/JUDGMENT: [] good [x]fair [] poor        IMPULSE CONTROL: [x] good []fair [] poor     COGNITION: [x] alert and oriented to person, time, place [] Lacks orientation to person/time/place. Describe: ___________________________  Static Hx of severe MDD  Modifiable Symptoms of insomnia  Protective Does not endorse current SI; future planning; important relationships (family, friends)  At this time, pt is denying any suicidality, future oriented, so her suicide risk is lowered, however she still endorses significant depression, hopelessness, insomnia and confusion, hence she is at Mod risk for suicide.     
MENTAL STATUS EXAM    APPEARANCE:  [x] adequately groomed [] disheveled [] malodorous [] Other:   BEHAVIOR: [x] cooperative [] uncooperative [] good EC [] poor EC [] well related [] oddly related [] guarded []PMA [] PMR []abnormal movements [] Other:   SPEED: [x] normal rate/rhythm/volume [] loud [] quiet [] slow [] rapid [] pressured [] Other: _________   MOOD: [x] euthymic [] dysphoric [] anxious [] irritable [] Other: ___________   AFFECT: [x] full [] expansive [] constricted [] blunted [] flat [] stable [] labile [] Other: ________________ THOUGHT PROCESS: [x] organized [] disorganized [] goal-directed [] concrete [] logical [] illogical   [] circumstantial [] tangential [] impoverished [] effusive [] repetitive [] Other:  THOUGHT CONTENT: [x] negative for delusions/suicidal ideation /homicidal ideation [] positive for delusions/suicidal ideation/homicidal ideation Describe:   PERCEPTION: [x] negative for auditory/ visual hallucinations [] positive for auditory/ visual hallucinations Describe: ________________________________________________________   INSIGHT/JUDGMENT: [] good [x]fair [] poor        IMPULSE CONTROL: [x] good []fair [] poor     COGNITION: [x] alert and oriented to person, time, place [] Lacks orientation to person/time/place. Describe: ___________________________  Pt is a 77 year old , retired cisgender woman domiciled alone in [place] with a previous history of inpatient hospitalization for depression in 2016 at and a a hx of depression, anxiety, agoraphobia, anorexia nervosa (patient denies diagnosis), Cluster B Personality Traits, and no hx of SA/NSSIB/Violence, or trauma. This writer met with the patient to complete the initial CAMS evaluation. She rated her current distress; she denied suicidal ideation, feelings of self-hate, and agitation, but did endorse some feelings of hopelessness, psychological pain, and stress. She repeated several times a desire to engage in group psychiatric treatment after discharge and hopes to find an appropriate group where she can share with others. She listed her family (specifically her son, daughter-in-law, and granddaughter) as well as herself, her friends, and her social life as reasons for living. She denied impulsive behavior but said she did experience some feelings of hopelessness. She endorsed severe insomnia since engaging with inpatient treatment; a plan for coping with symptoms of insomnia was collaboratively created (pt told this writer she will plan to continue to manage her medication with [facility] doctors, will implement breathing exercises, and will follow a nightly routine that includes speaking on the phone with friends in the evening).

## 2023-07-12 NOTE — BH TREATMENT PLAN - NSTXPLANTHERAPYSESSIONSFT_PSY_ALL_CORE
07-10-23  Type of therapy: Creative arts therapy  Type of session: Group  Level of patient participation: Resistance to participation  Duration of participation: Less than 15 minutes  Therapy conducted by: Psych rehab  Therapy Summary: Pt. continues to decline invitations to groups of all therapeutic modalities; she is interactive with select pts in the milieu.

## 2023-07-12 NOTE — BH INPATIENT PSYCHIATRY PROGRESS NOTE - NSBHASSESSSUMMFT_PSY_ALL_CORE
77 year old female, , retired, domiciled alone with PMH HTN, HLD, CAD, history of MI (September 2022) and PPH depression, anxiety, agoraphobia, anorexia nervosa (patient denies diagnosis), Cluster B Personality Traits, history of one prior inpatient psychiatric hospitalization at [facility] in 2016 for depression, currently engaged in outpatient psychiatric treatment with a [facility] licensed psychiatrist, no history of SA/NSSIB/Violence, no history of trauma, who presented with AMS after being found lethargic at home by EMS with empty bottles of medications and initial suspicion of intentional overdose, although the patient adamantly denies it was a suicide attempt.  The patient was assessed and followed by  Psychiatry who determined that overdose was likely unintentional and secondary to recent medication changes and polypharmacy.      On initial evaluation, the patient is calm, cooperative, polite and pleasant with good eye contact, well-related, euthymic affect and demonstrating good behavioral control and linear thought processes.  The patient reports daily low mood, anhedonia, worsening agoraphobia, poor appetite with weight loss (denies intentional weight loss) and hopelessness over the past several months.  She adamantly denies her overdose on home medications was intentional but, instead, the result of polypharmacy.  Collateral obtained from family members and outpatient psychiatrist confirm that the patient has not expressed SI or had any suicidal gestures. However, the patient's family and outpatient psychiatrist report concern for the patient's worsening depression (with significant weight loss for after her MI in September 2022).      Patient continues to be calm, cooperative with good eye contact and linear thought process. Patient appears anxious at times but is overall euthymic in mood. Denies any current SI/I/P, HI/I/P or VH/AH. Patient states many symptoms consistent with a diagnosis of a major depressive episode. Events leading to her hospitalization could have been related to altered mental status due to her medication regimen of mirtazapine 60 mg nightly, olanzapine 20 mg nightly, and klonopin 1 mg nightly. Due to patient's age and altered mental status, reversible cause of dementia must be ruled out. Upon admission, TSH wnl, CT head showed non specific periventricular white matter changes. B12, b9, HIV, RPR within normal limits, will be evaluated by neurology today. Patient also displays anxiety with agoraphobia that should be further explored and assessed perhaps with psychology team. Patient reports many medication trials but most beneficial has been klonopin. Will continue benadryl nightly as patient had . Additionally patient's weight loss since her MI is concerning, as she currently has a BMI of 15.3. At this time appetite appears related to depressive symptoms but cannot rule out anorexia nervosa. Evaluated by nutrition. The patient continues to be agreeable to a voluntary inpatient psychiatric hospitalization for medication optimization and establishment of outpatient follow-up.      PLAN  1. Voluntary Admission to [facility]  2. No psychiatric indication for CO 1:1 observation  3. D/c mirtazapine (Remeron) 7.5 mg PO qhs  4. Continue benadryl 25 mg po nightly   5. Increase olanzapine (Zyprexa) 5 mg PO nightly for sleep/mood  6. Hold home clonazepam (Klonopin) 1 mg PO BID for insomnia given associated risk of benzodiazapine for risk of falls and oversedation due to patient's age  7. CIWA monitoring for benzodiazepine withdrawal; can consider restarting clonazepam (Klonopin) 0.5 mg PO q12h PRN for benzodiazepine withdrawal with plan to taper; will continue to monitor  8.  Hold home Vilazodone (Viibryd)   9.  PRNs: haloperidol (Haldol) 2 mg PO/IM q6h PRN for agitation    ;;07/06: met with patient and her daughter and later with MSW.   Patient slept well with low dose Remeron 7.5mg and staff reported  no behavorial issues but unclear why patient received Haldol last night .  Patient would consider IOP for aftercare.  Need to monitor patient on minimal medications at this time to obtain some sense of baseline and consider need for appropriate antidepressant ; however this may need to ultimately be done as an outpatient.   Not endorsing  suicidal or homicidal ideation intent or plans; no mention of auditory or visual hallucinations .  Alert; oriented; cognition intact; speech clear; no tremor or evidence of movement impairment. Thinking is congruent with affect; no peculiarities of thinking or language use.   ;;07/08:  Patient reports improvement in mood but states that she has experienced increased anxiety at the thought or remaining on [facility].  She reports sleep has improved with olanzapine 2.5 mg PO qhs along with mirtazapine 7.5 mg PO qHs.  The patient denies SI/HI, intent, plan and AVH.   ;;07/10: poor early sleep; raising Olanzepine to 5mg at night; continue neuro w/u;  for transition to IOP.

## 2023-07-12 NOTE — BH PSYCHOLOGY - CLINICIAN PSYCHOTHERAPY NOTE - NSBHPSYCHOLGOALS_PSY_A_CORE
Decrease symptoms/Assessment/Improve social/vocational/coping skills
Improve social/vocational/coping skills

## 2023-07-12 NOTE — BH TREATMENT PLAN - NSTXPATIENTPARTICIPATE_PSY_ALL_CORE
Patient participated in identification of needs/problems/goals for treatment/Patient participated in defining interventions
Patient participated in identification of needs/problems/goals for treatment/Patient participated in defining interventions
Patient participated in identification of needs/problems/goals for treatment
Patient participated in identification of needs/problems/goals for treatment/Patient participated in defining interventions

## 2023-07-12 NOTE — BH INPATIENT PSYCHIATRY PROGRESS NOTE - NSBHFUPINTERVALHXFT_PSY_A_CORE
Patient seen at bedside. Reports good rest last night with benadryl instead of mirtazapine. Is upset about being rejected from PHP program yesterday however is hopeful another program will accept her. Additional PHP applications submitted today. Patient was able to list reasons why she would like a PHP program which she wrote down in anticipation of the next interview. Denies any current SI/I/P, HI/I/P or VH/AH.

## 2023-07-12 NOTE — BH INPATIENT PSYCHIATRY PROGRESS NOTE - CURRENT MEDICATION
MEDICATIONS  (STANDING):  atorvastatin 10 milliGRAM(s) Oral at bedtime  carvedilol 3.125 milliGRAM(s) Oral every 12 hours  cholecalciferol 2000 Unit(s) Oral daily  clopidogrel Tablet 75 milliGRAM(s) Oral daily  diphenhydrAMINE 25 milliGRAM(s) Oral at bedtime  levothyroxine 25 MICROGram(s) Oral daily  melatonin 3 milliGRAM(s) Oral at bedtime  OLANZapine 5 milliGRAM(s) Oral at bedtime  polyethylene glycol 3350 17 Gram(s) Oral at bedtime  sacubitril 24 mG/valsartan 26 mG 1 Tablet(s) Oral <User Schedule>    MEDICATIONS  (PRN):  acetaminophen     Tablet .. 650 milliGRAM(s) Oral every 6 hours PRN Moderate Pain (4 - 6)  aluminum hydroxide/magnesium hydroxide/simethicone Suspension 30 milliLiter(s) Oral every 4 hours PRN Dyspepsia

## 2023-07-12 NOTE — BH PSYCHOLOGY - CLINICIAN PSYCHOTHERAPY NOTE - NSBHPSYCHOLADDL_PSY_A_CORE
Ms. Gonsales is a 77 year old , retired cisgender woman domiciled alone in [place] with a previous history of inpatient hospitalization for depression in 2016 at and a hx of depression, anxiety, agoraphobia, anorexia nervosa (patient denies diagnosis), Cluster B Personality Traits, and no hx of SA/NSSIB/Violence, or trauma. Though she was hospitalized in the context of a possible overdose after being found at home by EMS with empty bottles of medication, she currently denies any suicidal ideation or intent, and  psychiatry determined that indeed it was likely unintentional (confirmed with collateral interviews).    Ms. Gonsales discussed her current anxious feelings about the future. She described experiencing negative affect when she does not have things to do and is concerned about not having a plan for the future (she specifically wants a group therapy program that takes place multiple days per week). She denied any suicidal ideation and reported that having things to do makes her feel more positive about her life; however she also noted that in the past she has experienced anxiety related to having appointments. She reported feeling confused and appeared confused at times. Ms. Gonsales evidenced numerous protective factors for safety risk including longtime friendships that are important to her, long-term engagement in individual therapy, and a strong focus on future planning, particularly related to the group referral she desires.    Ms. Gonsales is domiciled alone, and due to her present confusion and reported feeling of physical pain and unsteadiness, she may benefit from a home health aide who can provide supportive care at her home. She reported seeing the same individual psychotherapist for 20 years whom she plans to continue seeing. As Ms. Gonsales reported confusion that was observed by this writer and also displayed repetitive speech patterns, a full neuropsychological evaluation may clarify whether she is experiencing cognitive impairments that contribute to her current anxiety and confusion. She has consistently expressed a desire for a group treatment program that she can attend multiple times per week; an IOP, volunteer program, therapy group, or senior center with daily group activities could all be referrals that will help her fulfill this need.    MDD (major depressive disorder), recurrent episode   F33.9 Agoraphobia   F40.00 R/O Anorexia nervosa   F50.00 R/O Cluster B personality disorder in adult   F60.9  
Static Hx of severe MDD  Modifiable Symptoms of insomnia, hopelessness, confusion  Protective Does not endorse current SI, future oriented, feeling responsibility to her son. Has friends    Pts Acute risk is Low, as she is denying any suicidality, however endorsed

## 2023-07-12 NOTE — PROGRESS NOTE ADULT - SUBJECTIVE AND OBJECTIVE BOX
SUBJECTIVE / INTERVAL HPI: Patient seen and examined at bedside. Patient feels well this morning, she noticed herself to have a bruise in her right groin but does not have pain unless he touches his groin. She believes she may have fallen days ago but he does not remember the event so is not sure. She feels a slight bump there but does not limit her movement or cause her any type of pain when walking around     VITAL SIGNS:  Vital Signs Last 24 Hrs  T(C): 36.8 (12 Jul 2023 09:05), Max: 36.8 (12 Jul 2023 09:05)  T(F): 98.2 (12 Jul 2023 09:05), Max: 98.2 (12 Jul 2023 09:05)  HR: 97 (12 Jul 2023 09:05) (93 - 97)  BP: 120/70 (12 Jul 2023 09:05) (120/70 - 146/66)  BP(mean): --  RR: 18 (12 Jul 2023 09:05) (18 - 18)  SpO2: 98% (12 Jul 2023 09:05) (98% - 98%)    Parameters below as of 12 Jul 2023 09:05  Patient On (Oxygen Delivery Method): room air        PHYSICAL EXAM:    General: WDWN  HEENT: NC/AT; PERRL, anicteric sclera; MMM  Neck: supple  Cardiovascular: +S1/S2; RRR  Respiratory: CTA B/L; no W/R/R  Gastrointestinal: soft, NT/ND; +BSx4  Extremities: WWP; no edema, clubbing or cyanosis. R groin hematoma, soft superficial mobile mass   Neurological: AAOx3; no focal deficits    MEDICATIONS:  MEDICATIONS  (STANDING):  atorvastatin 10 milliGRAM(s) Oral at bedtime  carvedilol 3.125 milliGRAM(s) Oral every 12 hours  cholecalciferol 2000 Unit(s) Oral daily  clopidogrel Tablet 75 milliGRAM(s) Oral daily  diphenhydrAMINE 25 milliGRAM(s) Oral at bedtime  levothyroxine 25 MICROGram(s) Oral daily  melatonin 3 milliGRAM(s) Oral at bedtime  OLANZapine 5 milliGRAM(s) Oral at bedtime  polyethylene glycol 3350 17 Gram(s) Oral at bedtime  sacubitril 24 mG/valsartan 26 mG 1 Tablet(s) Oral <User Schedule>    MEDICATIONS  (PRN):  acetaminophen     Tablet .. 650 milliGRAM(s) Oral every 6 hours PRN Moderate Pain (4 - 6)  aluminum hydroxide/magnesium hydroxide/simethicone Suspension 30 milliLiter(s) Oral every 4 hours PRN Dyspepsia      ALLERGIES:  Allergies    codeine (Vomiting)    Intolerances        LABS:                        11.5   6.56  )-----------( 427      ( 12 Jul 2023 05:30 )             35.3               CAPILLARY BLOOD GLUCOSE          RADIOLOGY & ADDITIONAL TESTS: Reviewed.   SUBJECTIVE / INTERVAL HPI: Patient seen and examined at bedside. Patient feels well this morning, she noticed herself to have a bruise in her right groin but does not have pain unless she touches  groin. She believes she may have fallen days ago but does not remember the event so is not sure. She feels a slight bump there but does not limit her movement or cause her any type of pain when walking around     VITAL SIGNS:  Vital Signs Last 24 Hrs  T(C): 36.8 (12 Jul 2023 09:05), Max: 36.8 (12 Jul 2023 09:05)  T(F): 98.2 (12 Jul 2023 09:05), Max: 98.2 (12 Jul 2023 09:05)  HR: 97 (12 Jul 2023 09:05) (93 - 97)  BP: 120/70 (12 Jul 2023 09:05) (120/70 - 146/66)  BP(mean): --  RR: 18 (12 Jul 2023 09:05) (18 - 18)  SpO2: 98% (12 Jul 2023 09:05) (98% - 98%)    Parameters below as of 12 Jul 2023 09:05  Patient On (Oxygen Delivery Method): room air        PHYSICAL EXAM:    General: WDWN  HEENT: NC/AT; PERRL, anicteric sclera; MMM  Neck: supple  Cardiovascular: +S1/S2; RRR  Respiratory: CTA B/L; no W/R/R  Gastrointestinal: soft, NT/ND; +BSx4  Extremities: WWP; no edema, clubbing or cyanosis. R groin hematoma, soft superficial mobile mass   Neurological: AAOx3; no focal deficits    MEDICATIONS:  MEDICATIONS  (STANDING):  atorvastatin 10 milliGRAM(s) Oral at bedtime  carvedilol 3.125 milliGRAM(s) Oral every 12 hours  cholecalciferol 2000 Unit(s) Oral daily  clopidogrel Tablet 75 milliGRAM(s) Oral daily  diphenhydrAMINE 25 milliGRAM(s) Oral at bedtime  levothyroxine 25 MICROGram(s) Oral daily  melatonin 3 milliGRAM(s) Oral at bedtime  OLANZapine 5 milliGRAM(s) Oral at bedtime  polyethylene glycol 3350 17 Gram(s) Oral at bedtime  sacubitril 24 mG/valsartan 26 mG 1 Tablet(s) Oral <User Schedule>    MEDICATIONS  (PRN):  acetaminophen     Tablet .. 650 milliGRAM(s) Oral every 6 hours PRN Moderate Pain (4 - 6)  aluminum hydroxide/magnesium hydroxide/simethicone Suspension 30 milliLiter(s) Oral every 4 hours PRN Dyspepsia      ALLERGIES:  Allergies    codeine (Vomiting)    Intolerances        LABS:                        11.5   6.56  )-----------( 427      ( 12 Jul 2023 05:30 )             35.3               CAPILLARY BLOOD GLUCOSE          RADIOLOGY & ADDITIONAL TESTS: Reviewed.

## 2023-07-12 NOTE — BH INPATIENT PSYCHIATRY DISCHARGE NOTE - NSBHASSESSSUMMFT_PSY_ALL_CORE
77 year old female, , retired, domiciled alone with PMH HTN, HLD, CAD, history of MI (September 2022) and PPH depression, anxiety, agoraphobia, anorexia nervosa (patient denies diagnosis), Cluster B Personality Traits, history of one prior inpatient psychiatric hospitalization at [facility] in 2016 for depression, currently engaged in outpatient psychiatric treatment with a [facility] licensed psychiatrist, no history of SA/NSSIB/Violence, no history of trauma, who presented with AMS after being found lethargic at home by EMS with empty bottles of medications and initial suspicion of intentional overdose, although the patient adamantly denies it was a suicide attempt.  The patient was assessed and followed by  Psychiatry who determined that overdose was likely unintentional and secondary to recent medication changes and polypharmacy.      On initial evaluation, the patient is calm, cooperative, polite and pleasant with good eye contact, well-related, euthymic affect and demonstrating good behavioral control and linear thought processes.  The patient reports daily low mood, anhedonia, worsening agoraphobia, poor appetite with weight loss (denies intentional weight loss) and hopelessness over the past several months.  She adamantly denies her overdose on home medications was intentional but, instead, the result of polypharmacy.  Collateral obtained from family members and outpatient psychiatrist confirm that the patient has not expressed SI or had any suicidal gestures. However, the patient's family and outpatient psychiatrist report concern for the patient's worsening depression (with significant weight loss for after her MI in September 2022).      Patient continues to be calm, cooperative with good eye contact and linear thought process. Patient appears anxious at times but is overall euthymic in mood. Denies any current SI/I/P, HI/I/P or VH/AH. Patient states many symptoms consistent with a diagnosis of a major depressive episode. Events leading to her hospitalization could have been related to altered mental status due to her medication regimen of mirtazapine 60 mg nightly, olanzapine 20 mg nightly, and klonopin 1 mg nightly. Due to patient's age and altered mental status, reversible cause of dementia must be ruled out. Upon admission, TSH wnl, CT head showed non specific periventricular white matter changes. B12, b9, HIV, RPR within normal limits. Evaluated by neurology, and examination was significant for a MOCA of 24/30 however patient may have pseudodementia preventing interpretation of results. Patient also displays anxiety with agoraphobia that should be further explored and assessed perhaps with psychology team. Patient reports many medication trials but most beneficial has been klonopin. Will continue benadryl nightly. Additionally patient's weight loss since her MI is concerning, as she currently has a BMI of 15.3. At this time appetite appears related to depressive symptoms but cannot rule out anorexia nervosa. Evaluated by nutrition. The patient continues to be agreeable to a voluntary inpatient psychiatric hospitalization for medication optimization and establishment of outpatient follow-up.  Patient to be discharged Monday.     PLAN  1. Voluntary Admission to [facility]  2. No psychiatric indication for CO 1:1 observation  3. D/c mirtazapine (Remeron) 7.5 mg PO qhs  4. Continue benadryl 25 mg po nightly   5. Increase olanzapine (Zyprexa) 5 mg PO nightly for sleep/mood  6. Hold home clonazepam (Klonopin) 1 mg PO BID for insomnia given associated risk of benzodiazapine for risk of falls and oversedation due to patient's age  7. CIWA monitoring for benzodiazepine withdrawal; can consider restarting clonazepam (Klonopin) 0.5 mg PO q12h PRN for benzodiazepine withdrawal with plan to taper; will continue to monitor  8.  Hold home Vilazodone (Viibryd)   9.  PRNs: haloperidol (Haldol) 2 mg PO/IM q6h PRN for agitation    ;;07/06: met with patient and her daughter and later with MSW.   Patient slept well with low dose Remeron 7.5mg and staff reported  no behavorial issues but unclear why patient received Haldol last night .  Patient would consider IOP for aftercare.  Need to monitor patient on minimal medications at this time to obtain some sense of baseline and consider need for appropriate antidepressant ; however this may need to ultimately be done as an outpatient.   Not endorsing  suicidal or homicidal ideation intent or plans; no mention of auditory or visual hallucinations .  Alert; oriented; cognition intact; speech clear; no tremor or evidence of movement impairment. Thinking is congruent with affect; no peculiarities of thinking or language use.   ;;07/08:  Patient reports improvement in mood but states that she has experienced increased anxiety at the thought or remaining on [facility].  She reports sleep has improved with olanzapine 2.5 mg PO qhs along with mirtazapine 7.5 mg PO qHs.  The patient denies SI/HI, intent, plan and AVH.   ;;07/10: poor early sleep; raising Olanzepine to 5mg at night; continue neuro w/u;  for transition to IOP.

## 2023-07-12 NOTE — BH INPATIENT PSYCHIATRY PROGRESS NOTE - NSBHCHARTREVIEWVS_PSY_A_CORE FT
Vital Signs Last 24 Hrs  T(C): 36.4 (07-11-23 @ 20:13), Max: 36.6 (07-11-23 @ 08:55)  T(F): 97.6 (07-11-23 @ 20:13), Max: 97.9 (07-11-23 @ 08:55)  HR: 93 (07-11-23 @ 20:13) (93 - 96)  BP: 127/77 (07-11-23 @ 20:13) (127/77 - 146/66)  BP(mean): --  RR: 18 (07-11-23 @ 08:55) (18 - 18)  SpO2: 98% (07-11-23 @ 20:13) (96% - 98%)     Vital Signs Last 24 Hrs  T(C): 36.6 (07-12-23 @ 16:46), Max: 36.8 (07-12-23 @ 09:05)  T(F): 97.8 (07-12-23 @ 16:46), Max: 98.2 (07-12-23 @ 09:05)  HR: 90 (07-12-23 @ 16:46) (90 - 97)  BP: 120/77 (07-12-23 @ 16:46) (120/70 - 127/77)  BP(mean): --  RR: 18 (07-12-23 @ 16:46) (18 - 18)  SpO2: 98% (07-12-23 @ 16:46) (98% - 98%)     Vital Signs Last 24 Hrs  T(C): 36.6 (07-12-23 @ 16:46), Max: 36.8 (07-12-23 @ 09:05)  T(F): 97.8 (07-12-23 @ 16:46), Max: 98.2 (07-12-23 @ 09:05)  HR: 90 (07-12-23 @ 16:46) (90 - 97)  BP: 120/77 (07-12-23 @ 16:46) (120/70 - 120/77)  BP(mean): --  RR: 18 (07-12-23 @ 16:46) (18 - 18)  SpO2: 98% (07-12-23 @ 16:46) (98% - 98%)

## 2023-07-12 NOTE — BH INPATIENT PSYCHIATRY DISCHARGE NOTE - HOSPITAL COURSE
Patient was admitted to medicine service for toxic metabolic encephalopathy likely due to accidental medication overdose at home. Patient was seen by  psychiatry and decision was made to transfer patient to [facility] under voluntary status. Patient was restarted on mirtazapine 7.5 mg, and viibryd, olanzapine, klonopin were held. Patient reports daily low mood, anhedonia, worsening agoraphobia, poor appetite with weight loss (denies intentional weight loss) and hopelessness over the past several months. Patient continuously denied the medication overdose as a suicide attempt and reports confusion that day regarding her medications as it can be difficult to manage all her medications. On the inpatient unit, patient was restarted on zyprexa 2.5 mg nightly for sleep which was titrated to zyprexa 5 mg nightly. Patient continued to deny any suicidal ideation intent or plan. Patient denied any homicidal intent or plan or any visual or auditory hallucinations. Patient was evaluated by neurology for her altered mental status upon admission. No significant abnormalities were found by neurology service, MOCA showed score of 24/30. Patient also developed a bruise on the right groin area and was evaluated by medicine service. At this time, patient is currently no longer a danger to self or others and currently no longer meets criteria for inpatient admission for inpatient psychiatric admission. Patient was admitted to medicine service for toxic metabolic encephalopathy likely due to accidental medication overdose at home. Patient was seen by  psychiatry and decision was made to transfer patient to [facility] under voluntary status. Patient was restarted on mirtazapine 7.5 mg, and viibryd, olanzapine, klonopin were held. Patient reported daily low mood, anhedonia, worsening agoraphobia, poor appetite with weight loss (denies intentional weight loss) and hopelessness over the past several months. Patient continuously denied the medication overdose as a suicide attempt and reported confusion that day regarding her medications as there were recent changes to dosing. On the inpatient unit, patient was restarted on zyprexa 2.5 mg nightly for sleep which was titrated to zyprexa 5 mg nightly. For sleep, mirtazapine was discontinued in favor of starting benadryl nightly which was more effective for sleep. Throughout patient's stay on the unit, patient continued to deny any suicidal ideation intent or plan, any homicidal ideation intent or plan or any visual or auditory hallucinations. Patient was evaluated by neurology for her altered mental status upon admission who attributed this as due to high home dose of mirtazapine (60 mg). No significant abnormalities were found by neurology service, MOCA showed score of 24/30. TSH, B12, B9, HIV, RPR within normal limits. CT head showed non specific periventricular white matter changes. At this time, patient is currently no longer a danger to self or others and currently no longer meets criteria for inpatient admission for inpatient psychiatric admission. Patient to have initial intake appointment with her psychiatrist at [facility] tomorrow at 9:30 am.

## 2023-07-12 NOTE — BH TREATMENT PLAN - NSTXDEPRESINTERRN_PSY_ALL_CORE
Establish therapeutic relationship allowing patient express feelings and concerns. Encourage patient to utilize coping skills.

## 2023-07-12 NOTE — BH INPATIENT PSYCHIATRY PROGRESS NOTE - NSBHMETABOLIC_PSY_ALL_CORE_FT
BMI: BMI (kg/m2): 15.3 (07-03-23 @ 20:44)  HbA1c: A1C with Estimated Average Glucose Result: 5.1 % (07-06-23 @ 05:30)    Glucose: POCT Blood Glucose.: 114 mg/dL (07-01-23 @ 15:50)    BP: 127/77 (07-11-23 @ 20:13) (115/62 - 156/85)  Lipid Panel: Date/Time: 07-06-23 @ 05:30  Cholesterol, Serum: 140  Direct LDL: --  HDL Cholesterol, Serum: 53  Total Cholesterol/HDL Ration Measurement: --  Triglycerides, Serum: 112   BMI: BMI (kg/m2): 15.3 (07-03-23 @ 20:44)  HbA1c: A1C with Estimated Average Glucose Result: 5.1 % (07-06-23 @ 05:30)    Glucose: POCT Blood Glucose.: 114 mg/dL (07-01-23 @ 15:50)    BP: 120/77 (07-12-23 @ 16:46) (115/62 - 156/85)  Lipid Panel: Date/Time: 07-06-23 @ 05:30  Cholesterol, Serum: 140  Direct LDL: --  HDL Cholesterol, Serum: 53  Total Cholesterol/HDL Ration Measurement: --  Triglycerides, Serum: 112

## 2023-07-12 NOTE — BH INPATIENT PSYCHIATRY DISCHARGE NOTE - NSDCCPCAREPLAN_GEN_ALL_CORE_FT
PRINCIPAL DISCHARGE DIAGNOSIS  Diagnosis: Depression, unspecified  Assessment and Plan of Treatment:

## 2023-07-12 NOTE — BH INPATIENT PSYCHIATRY DISCHARGE NOTE - DESCRIPTION
The patient reports that she was born and raised in [place] by her biological mother and father and she was an only child.  She is currently domiciled alone in a [place] apartment located on [address] [place] [address].  She states that she currently supports herself with SSI and an inheritance from her  .  She reports that she is  and that her  passed away 13 years ago.  She denies a history of  service.  She identifies as Confucianist.

## 2023-07-12 NOTE — BH TREATMENT PLAN - NSTXCOPEGOAL_PSY_ALL_CORE
Subjective:       Patient ID: Eve Choi III is a 54 y.o. male.    Chief Complaint: Preop Covid Swab    Preop covid swab completed by [facility].   Patient informs me after swabbing him that he was exposed to covid.   Explained to patient importance of disclosing this info prior to having his swab done as if he is pos, he is exposing the person performing test.   Patient also instructed to call his doctor and see if surgery needs to be rescheduled. [facility]      ROS     Objective:      Physical Exam    Assessment:       1. Pre-op testing        Plan:                   No follow-ups on file.      
Identify and utilize 2 coping skills that meet their needs

## 2023-07-12 NOTE — PROGRESS NOTE ADULT - ASSESSMENT
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  Medicine consulted R groin bruising.    #groin bruise  patient with superficial mass on R groin and hematoma which appears old  likely 2/2 an old trauma, appears to not be recent injury  at this time does not appear to be rigid, is only mildly tender  patient on plavix at this time, possible cause of bruise  platelet count stable at this time  -continue to monitor     Appreciate the consult, medicine will continue to follow.   Note not finalized until attending attestation complete.

## 2023-07-13 PROCEDURE — 99232 SBSQ HOSP IP/OBS MODERATE 35: CPT

## 2023-07-13 RX ORDER — LEVOTHYROXINE SODIUM 125 MCG
1 TABLET ORAL
Qty: 0 | Refills: 0 | DISCHARGE
Start: 2023-07-13

## 2023-07-13 RX ORDER — SACUBITRIL AND VALSARTAN 24; 26 MG/1; MG/1
1 TABLET, FILM COATED ORAL
Qty: 0 | Refills: 0 | DISCHARGE
Start: 2023-07-13

## 2023-07-13 RX ORDER — CLOPIDOGREL BISULFATE 75 MG/1
1 TABLET, FILM COATED ORAL
Qty: 0 | Refills: 0 | DISCHARGE
Start: 2023-07-13

## 2023-07-13 RX ORDER — CLOPIDOGREL BISULFATE 75 MG/1
1 TABLET, FILM COATED ORAL
Refills: 0 | DISCHARGE

## 2023-07-13 RX ORDER — SACUBITRIL AND VALSARTAN 24; 26 MG/1; MG/1
1 TABLET, FILM COATED ORAL
Refills: 0 | DISCHARGE

## 2023-07-13 RX ORDER — DIPHENHYDRAMINE HCL 50 MG
1 CAPSULE ORAL
Qty: 0 | Refills: 0 | DISCHARGE
Start: 2023-07-13

## 2023-07-13 RX ORDER — ATORVASTATIN CALCIUM 80 MG/1
1 TABLET, FILM COATED ORAL
Qty: 0 | Refills: 0 | DISCHARGE
Start: 2023-07-13

## 2023-07-13 RX ORDER — OMEPRAZOLE 10 MG/1
0 CAPSULE, DELAYED RELEASE ORAL
Qty: 0 | Refills: 0 | DISCHARGE

## 2023-07-13 RX ORDER — CLONAZEPAM 1 MG
0.5 TABLET ORAL
Qty: 0 | Refills: 0 | DISCHARGE

## 2023-07-13 RX ORDER — OLANZAPINE 15 MG/1
1 TABLET, FILM COATED ORAL
Qty: 0 | Refills: 0 | DISCHARGE
Start: 2023-07-13

## 2023-07-13 RX ORDER — ATORVASTATIN CALCIUM 80 MG/1
1 TABLET, FILM COATED ORAL
Refills: 0 | DISCHARGE

## 2023-07-13 RX ORDER — GABAPENTIN 400 MG/1
100 CAPSULE ORAL ONCE
Refills: 0 | Status: COMPLETED | OUTPATIENT
Start: 2023-07-13 | End: 2023-07-14

## 2023-07-13 RX ADMIN — Medication 25 MICROGRAM(S): at 10:17

## 2023-07-13 RX ADMIN — CLOPIDOGREL BISULFATE 75 MILLIGRAM(S): 75 TABLET, FILM COATED ORAL at 10:16

## 2023-07-13 RX ADMIN — OLANZAPINE 5 MILLIGRAM(S): 15 TABLET, FILM COATED ORAL at 21:06

## 2023-07-13 RX ADMIN — CARVEDILOL PHOSPHATE 3.12 MILLIGRAM(S): 80 CAPSULE, EXTENDED RELEASE ORAL at 21:06

## 2023-07-13 RX ADMIN — Medication 3 MILLIGRAM(S): at 21:06

## 2023-07-13 RX ADMIN — POLYETHYLENE GLYCOL 3350 17 GRAM(S): 17 POWDER, FOR SOLUTION ORAL at 21:06

## 2023-07-13 RX ADMIN — Medication 25 MILLIGRAM(S): at 21:05

## 2023-07-13 RX ADMIN — Medication 2000 UNIT(S): at 10:17

## 2023-07-13 RX ADMIN — CARVEDILOL PHOSPHATE 3.12 MILLIGRAM(S): 80 CAPSULE, EXTENDED RELEASE ORAL at 10:15

## 2023-07-13 RX ADMIN — ATORVASTATIN CALCIUM 10 MILLIGRAM(S): 80 TABLET, FILM COATED ORAL at 21:05

## 2023-07-13 RX ADMIN — SACUBITRIL AND VALSARTAN 1 TABLET(S): 24; 26 TABLET, FILM COATED ORAL at 10:17

## 2023-07-13 NOTE — PROGRESS NOTE ADULT - ASSESSMENT
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  Medicine consulted R groin bruising.    #groin bruise  patient with superficial mass on R groin and hematoma which appears old -today looks improved from yesterday.   likely 2/2 an old trauma, appears to not be recent injury  at this time does not appear to be rigid, nontender   patient on plavix at this time, possible cause of bruise  platelet count stable at this time  -continue to monitor     Appreciate the consult, medicine will continue to follow.   Note not finalized until attending attestation complete.

## 2023-07-13 NOTE — BH INPATIENT PSYCHIATRY PROGRESS NOTE - NSBHCHARTREVIEWVS_PSY_A_CORE FT
Vital Signs Last 24 Hrs  T(C): 36.9 (07-12-23 @ 20:51), Max: 36.9 (07-12-23 @ 20:51)  T(F): 98.5 (07-12-23 @ 20:51), Max: 98.5 (07-12-23 @ 20:51)  HR: 89 (07-12-23 @ 20:51) (89 - 97)  BP: 127/75 (07-12-23 @ 20:51) (120/70 - 127/75)  BP(mean): --  RR: 18 (07-12-23 @ 20:51) (18 - 18)  SpO2: 99% (07-12-23 @ 20:51) (98% - 99%)     Vital Signs Last 24 Hrs  T(C): 36.4 (07-13-23 @ 16:16), Max: 36.9 (07-12-23 @ 20:51)  T(F): 97.6 (07-13-23 @ 16:16), Max: 98.5 (07-12-23 @ 20:51)  HR: 82 (07-13-23 @ 16:16) (82 - 89)  BP: 130/58 (07-13-23 @ 16:16) (126/62 - 130/58)  BP(mean): --  RR: 18 (07-13-23 @ 16:16) (18 - 18)  SpO2: 100% (07-13-23 @ 16:16) (95% - 100%)

## 2023-07-13 NOTE — BH INPATIENT PSYCHIATRY PROGRESS NOTE - NSBHFUPINTERVALHXFT_PSY_A_CORE
Focused on discharge; wants to leave.  difficulties finding a PHP required rethinking dc plan;  considering outpatient psychiatrist with [id number]; discussed with patient and son during visiting hours;  Not endorsing  suicidal or homicidal ideation intent or plans; no mention of auditory or visual hallucinations Alert; oriented; cognition intact; speech clear; no tremor or evidence of movement impairment.

## 2023-07-13 NOTE — PROGRESS NOTE ADULT - SUBJECTIVE AND OBJECTIVE BOX
SUBJECTIVE / INTERVAL HPI: Patient seen and examined at bedside. She states she feels better at this time, and her groin pain when she touches it is now resolved.     VITAL SIGNS:  Vital Signs Last 24 Hrs  T(C): 36.9 (12 Jul 2023 20:51), Max: 36.9 (12 Jul 2023 20:51)  T(F): 98.5 (12 Jul 2023 20:51), Max: 98.5 (12 Jul 2023 20:51)  HR: 89 (12 Jul 2023 20:51) (89 - 90)  BP: 127/75 (12 Jul 2023 20:51) (120/77 - 127/75)  BP(mean): --  RR: 18 (12 Jul 2023 20:51) (18 - 18)  SpO2: 99% (12 Jul 2023 20:51) (98% - 99%)    Parameters below as of 12 Jul 2023 20:51  Patient On (Oxygen Delivery Method): room air        PHYSICAL EXAM:    General: WDWN  HEENT: NC/AT; PERRL, anicteric sclera; MMM  Neck: supple  Cardiovascular: +S1/S2; RRR  Respiratory: CTA B/L; no W/R/R  Gastrointestinal: soft, NT/ND; +BSx4  Extremities:  R groin hematoma, soft superficial mobile mass - improved discoloration   Vascular: 2+ radial, DP/PT pulses B/L  Neurological: AAOx3; no focal deficits    MEDICATIONS:  MEDICATIONS  (STANDING):  atorvastatin 10 milliGRAM(s) Oral at bedtime  carvedilol 3.125 milliGRAM(s) Oral every 12 hours  cholecalciferol 2000 Unit(s) Oral daily  clopidogrel Tablet 75 milliGRAM(s) Oral daily  diphenhydrAMINE 25 milliGRAM(s) Oral at bedtime  levothyroxine 25 MICROGram(s) Oral daily  melatonin 3 milliGRAM(s) Oral at bedtime  OLANZapine 5 milliGRAM(s) Oral at bedtime  polyethylene glycol 3350 17 Gram(s) Oral at bedtime  sacubitril 24 mG/valsartan 26 mG 1 Tablet(s) Oral <User Schedule>    MEDICATIONS  (PRN):  acetaminophen     Tablet .. 650 milliGRAM(s) Oral every 6 hours PRN Moderate Pain (4 - 6)  aluminum hydroxide/magnesium hydroxide/simethicone Suspension 30 milliLiter(s) Oral every 4 hours PRN Dyspepsia      ALLERGIES:  Allergies    codeine (Vomiting)    Intolerances        LABS:                        11.5   6.56  )-----------( 427      ( 12 Jul 2023 05:30 )             35.3               CAPILLARY BLOOD GLUCOSE          RADIOLOGY & ADDITIONAL TESTS: Reviewed.

## 2023-07-13 NOTE — BH INPATIENT PSYCHIATRY PROGRESS NOTE - NSBHMETABOLIC_PSY_ALL_CORE_FT
BMI: BMI (kg/m2): 15.3 (07-03-23 @ 20:44)  HbA1c: A1C with Estimated Average Glucose Result: 5.1 % (07-06-23 @ 05:30)    Glucose: POCT Blood Glucose.: 114 mg/dL (07-01-23 @ 15:50)    BP: 127/75 (07-12-23 @ 20:51) (115/62 - 146/66)  Lipid Panel: Date/Time: 07-06-23 @ 05:30  Cholesterol, Serum: 140  Direct LDL: --  HDL Cholesterol, Serum: 53  Total Cholesterol/HDL Ration Measurement: --  Triglycerides, Serum: 112   BMI: BMI (kg/m2): 15.3 (07-03-23 @ 20:44)  HbA1c: A1C with Estimated Average Glucose Result: 5.1 % (07-06-23 @ 05:30)    Glucose: POCT Blood Glucose.: 114 mg/dL (07-01-23 @ 15:50)    BP: 130/58 (07-13-23 @ 16:16) (120/70 - 146/66)  Lipid Panel: Date/Time: 07-06-23 @ 05:30  Cholesterol, Serum: 140  Direct LDL: --  HDL Cholesterol, Serum: 53  Total Cholesterol/HDL Ration Measurement: --  Triglycerides, Serum: 112

## 2023-07-13 NOTE — BH INPATIENT PSYCHIATRY PROGRESS NOTE - NSBHASSESSSUMMFT_PSY_ALL_CORE
77 year old female, , retired, domiciled alone with PMH HTN, HLD, CAD, history of MI (September 2022) and PPH depression, anxiety, agoraphobia, anorexia nervosa (patient denies diagnosis), Cluster B Personality Traits, history of one prior inpatient psychiatric hospitalization at [facility] in 2016 for depression, currently engaged in outpatient psychiatric treatment with a [facility] licensed psychiatrist, no history of SA/NSSIB/Violence, no history of trauma, who presented with AMS after being found lethargic at home by EMS with empty bottles of medications and initial suspicion of intentional overdose, although the patient adamantly denies it was a suicide attempt.  The patient was assessed and followed by  Psychiatry who determined that overdose was likely unintentional and secondary to recent medication changes and polypharmacy.      On initial evaluation, the patient is calm, cooperative, polite and pleasant with good eye contact, well-related, euthymic affect and demonstrating good behavioral control and linear thought processes.  The patient reports daily low mood, anhedonia, worsening agoraphobia, poor appetite with weight loss (denies intentional weight loss) and hopelessness over the past several months.  She adamantly denies her overdose on home medications was intentional but, instead, the result of polypharmacy.  Collateral obtained from family members and outpatient psychiatrist confirm that the patient has not expressed SI or had any suicidal gestures. However, the patient's family and outpatient psychiatrist report concern for the patient's worsening depression (with significant weight loss for after her MI in September 2022).      Patient continues to be calm, cooperative with good eye contact and linear thought process. Patient appears anxious at times but is overall euthymic in mood. Denies any current SI/I/P, HI/I/P or VH/AH. Patient states many symptoms consistent with a diagnosis of a major depressive episode. Events leading to her hospitalization could have been related to altered mental status due to her medication regimen of mirtazapine 60 mg nightly, olanzapine 20 mg nightly, and klonopin 1 mg nightly. Due to patient's age and altered mental status, reversible cause of dementia must be ruled out. Upon admission, TSH wnl, CT head showed non specific periventricular white matter changes. B12, b9, HIV, RPR within normal limits, will be evaluated by neurology today. Patient also displays anxiety with agoraphobia that should be further explored and assessed perhaps with psychology team. Patient reports many medication trials but most beneficial has been klonopin. Will continue benadryl nightly as patient had . Additionally patient's weight loss since her MI is concerning, as she currently has a BMI of 15.3. At this time appetite appears related to depressive symptoms but cannot rule out anorexia nervosa. Evaluated by nutrition. The patient continues to be agreeable to a voluntary inpatient psychiatric hospitalization for medication optimization and establishment of outpatient follow-up.      PLAN  1. Voluntary Admission to [facility]  2. No psychiatric indication for CO 1:1 observation  3. D/c mirtazapine (Remeron) 7.5 mg PO qhs  4. Continue benadryl 25 mg po nightly   5. Increase olanzapine (Zyprexa) 5 mg PO nightly for sleep/mood  6. Hold home clonazepam (Klonopin) 1 mg PO BID for insomnia given associated risk of benzodiazapine for risk of falls and oversedation due to patient's age  7. CIWA monitoring for benzodiazepine withdrawal; can consider restarting clonazepam (Klonopin) 0.5 mg PO q12h PRN for benzodiazepine withdrawal with plan to taper; will continue to monitor  8.  Hold home Vilazodone (Viibryd)   9.  PRNs: haloperidol (Haldol) 2 mg PO/IM q6h PRN for agitation    ;;07/06: met with patient and her daughter and later with MSW.   Patient slept well with low dose Remeron 7.5mg and staff reported  no behavorial issues but unclear why patient received Haldol last night .  Patient would consider IOP for aftercare.  Need to monitor patient on minimal medications at this time to obtain some sense of baseline and consider need for appropriate antidepressant ; however this may need to ultimately be done as an outpatient.   Not endorsing  suicidal or homicidal ideation intent or plans; no mention of auditory or visual hallucinations .  Alert; oriented; cognition intact; speech clear; no tremor or evidence of movement impairment. Thinking is congruent with affect; no peculiarities of thinking or language use.   ;;07/08:  Patient reports improvement in mood but states that she has experienced increased anxiety at the thought or remaining on [facility].  She reports sleep has improved with olanzapine 2.5 mg PO qhs along with mirtazapine 7.5 mg PO qHs.  The patient denies SI/HI, intent, plan and AVH.   ;;07/10: poor early sleep; raising Olanzepine to 5mg at night; continue neuro w/u;  for transition to IOP.

## 2023-07-14 PROCEDURE — 99232 SBSQ HOSP IP/OBS MODERATE 35: CPT

## 2023-07-14 RX ORDER — GABAPENTIN 400 MG/1
100 CAPSULE ORAL EVERY 24 HOURS
Refills: 0 | Status: DISCONTINUED | OUTPATIENT
Start: 2023-07-14 | End: 2023-07-16

## 2023-07-14 RX ADMIN — SACUBITRIL AND VALSARTAN 1 TABLET(S): 24; 26 TABLET, FILM COATED ORAL at 10:35

## 2023-07-14 RX ADMIN — Medication 25 MILLIGRAM(S): at 21:03

## 2023-07-14 RX ADMIN — Medication 25 MICROGRAM(S): at 06:44

## 2023-07-14 RX ADMIN — GABAPENTIN 100 MILLIGRAM(S): 400 CAPSULE ORAL at 21:36

## 2023-07-14 RX ADMIN — Medication 3 MILLIGRAM(S): at 21:03

## 2023-07-14 RX ADMIN — ATORVASTATIN CALCIUM 10 MILLIGRAM(S): 80 TABLET, FILM COATED ORAL at 21:03

## 2023-07-14 RX ADMIN — Medication 2000 UNIT(S): at 10:34

## 2023-07-14 RX ADMIN — GABAPENTIN 100 MILLIGRAM(S): 400 CAPSULE ORAL at 00:20

## 2023-07-14 RX ADMIN — CARVEDILOL PHOSPHATE 3.12 MILLIGRAM(S): 80 CAPSULE, EXTENDED RELEASE ORAL at 21:03

## 2023-07-14 RX ADMIN — CARVEDILOL PHOSPHATE 3.12 MILLIGRAM(S): 80 CAPSULE, EXTENDED RELEASE ORAL at 10:34

## 2023-07-14 RX ADMIN — CLOPIDOGREL BISULFATE 75 MILLIGRAM(S): 75 TABLET, FILM COATED ORAL at 10:34

## 2023-07-14 RX ADMIN — OLANZAPINE 5 MILLIGRAM(S): 15 TABLET, FILM COATED ORAL at 21:03

## 2023-07-14 NOTE — BH INPATIENT PSYCHIATRY PROGRESS NOTE - NSTXDEPRESDATETRGT_PSY_ALL_CORE
10-Jul-2023
17-Jul-2023

## 2023-07-14 NOTE — BH INPATIENT PSYCHIATRY PROGRESS NOTE - NSBHASSESSSUMMFT_PSY_ALL_CORE
77 year old female, , retired, domiciled alone with PMH HTN, HLD, CAD, history of MI (September 2022) and PPH depression, anxiety, agoraphobia, anorexia nervosa (patient denies diagnosis), Cluster B Personality Traits, history of one prior inpatient psychiatric hospitalization at [facility] in 2016 for depression, currently engaged in outpatient psychiatric treatment with a [facility] licensed psychiatrist, no history of SA/NSSIB/Violence, no history of trauma, who presented with AMS after being found lethargic at home by EMS with empty bottles of medications and initial suspicion of intentional overdose, although the patient adamantly denies it was a suicide attempt.  The patient was assessed and followed by  Psychiatry who determined that overdose was likely unintentional and secondary to recent medication changes and polypharmacy.      On initial evaluation, the patient is calm, cooperative, polite and pleasant with good eye contact, well-related, euthymic affect and demonstrating good behavioral control and linear thought processes.  The patient reports daily low mood, anhedonia, worsening agoraphobia, poor appetite with weight loss (denies intentional weight loss) and hopelessness over the past several months.  She adamantly denies her overdose on home medications was intentional but, instead, the result of polypharmacy.  Collateral obtained from family members and outpatient psychiatrist confirm that the patient has not expressed SI or had any suicidal gestures. However, the patient's family and outpatient psychiatrist report concern for the patient's worsening depression (with significant weight loss for after her MI in September 2022).      Patient continues to be calm, cooperative with good eye contact and linear thought process. Patient appears anxious at times but is overall euthymic in mood. Denies any current SI/I/P, HI/I/P or VH/AH. Patient states many symptoms consistent with a diagnosis of a major depressive episode. Events leading to her hospitalization could have been related to altered mental status due to her medication regimen of mirtazapine 60 mg nightly, olanzapine 20 mg nightly, and klonopin 1 mg nightly. Due to patient's age and altered mental status, reversible cause of dementia must be ruled out. Upon admission, TSH wnl, CT head showed non specific periventricular white matter changes. B12, b9, HIV, RPR within normal limits, will be evaluated by neurology today. Patient also displays anxiety with agoraphobia that should be further explored and assessed perhaps with psychology team. Patient reports many medication trials but most beneficial has been klonopin. Will continue benadryl nightly as patient had . Additionally patient's weight loss since her MI is concerning, as she currently has a BMI of 15.3. At this time appetite appears related to depressive symptoms but cannot rule out anorexia nervosa. Evaluated by nutrition. The patient continues to be agreeable to a voluntary inpatient psychiatric hospitalization for medication optimization and establishment of outpatient follow-up.      PLAN  1. Voluntary Admission to [facility]  2. No psychiatric indication for CO 1:1 observation  3. D/c mirtazapine (Remeron) 7.5 mg PO qhs  4. Continue benadryl 25 mg po nightly   5. Increase olanzapine (Zyprexa) 5 mg PO nightly for sleep/mood  6. Hold home clonazepam (Klonopin) 1 mg PO BID for insomnia given associated risk of benzodiazapine for risk of falls and oversedation due to patient's age  7. CIWA monitoring for benzodiazepine withdrawal; can consider restarting clonazepam (Klonopin) 0.5 mg PO q12h PRN for benzodiazepine withdrawal with plan to taper; will continue to monitor  8.  Hold home Vilazodone (Viibryd)   9.  PRNs: haloperidol (Haldol) 2 mg PO/IM q6h PRN for agitation    ;;07/06: met with patient and her daughter and later with MSW.   Patient slept well with low dose Remeron 7.5mg and staff reported  no behavorial issues but unclear why patient received Haldol last night .  Patient would consider IOP for aftercare.  Need to monitor patient on minimal medications at this time to obtain some sense of baseline and consider need for appropriate antidepressant ; however this may need to ultimately be done as an outpatient.   Not endorsing  suicidal or homicidal ideation intent or plans; no mention of auditory or visual hallucinations .  Alert; oriented; cognition intact; speech clear; no tremor or evidence of movement impairment. Thinking is congruent with affect; no peculiarities of thinking or language use.   ;;07/08:  Patient reports improvement in mood but states that she has experienced increased anxiety at the thought or remaining on [facility].  She reports sleep has improved with olanzapine 2.5 mg PO qhs along with mirtazapine 7.5 mg PO qHs.  The patient denies SI/HI, intent, plan and AVH.   ;;07/10: poor early sleep; raising Olanzepine to 5mg at night; continue neuro w/u;  for transition to IOP.  77 year old female, , retired, domiciled alone with PMH HTN, HLD, CAD, history of MI (September 2022) and PPH depression, anxiety, agoraphobia, anorexia nervosa (patient denies diagnosis), Cluster B Personality Traits, history of one prior inpatient psychiatric hospitalization at [facility] in 2016 for depression, currently engaged in outpatient psychiatric treatment with a [facility] licensed psychiatrist, no history of SA/NSSIB/Violence, no history of trauma, who presented with AMS after being found lethargic at home by EMS with empty bottles of medications and initial suspicion of intentional overdose, although the patient adamantly denies it was a suicide attempt.  The patient was assessed and followed by  Psychiatry who determined that overdose was likely unintentional and secondary to recent medication changes and polypharmacy.      On initial evaluation, the patient is calm, cooperative, polite and pleasant with good eye contact, well-related, euthymic affect and demonstrating good behavioral control and linear thought processes.  The patient reports daily low mood, anhedonia, worsening agoraphobia, poor appetite with weight loss (denies intentional weight loss) and hopelessness over the past several months.  She adamantly denies her overdose on home medications was intentional but, instead, the result of polypharmacy.  Collateral obtained from family members and outpatient psychiatrist confirm that the patient has not expressed SI or had any suicidal gestures. However, the patient's family and outpatient psychiatrist report concern for the patient's worsening depression (with significant weight loss for after her MI in September 2022).      Patient continues to be calm, cooperative with good eye contact and linear thought process. Patient appears anxious at times but is overall euthymic in mood. Denies any current SI/I/P, HI/I/P or VH/AH. Patient states many symptoms consistent with a diagnosis of a major depressive episode. Events leading to her hospitalization could have been related to altered mental status due to her medication regimen of mirtazapine 60 mg nightly, olanzapine 20 mg nightly, and klonopin 1 mg nightly. Due to patient's age and altered mental status, reversible cause of dementia must be ruled out. Upon admission, TSH wnl, CT head showed non specific periventricular white matter changes. B12, b9, HIV, RPR within normal limits. Evaluated by neurology, and examination was significant for a MOCA of 24/30 however patient may have pseudodementia preventing interpretation of results. Patient also displays anxiety with agoraphobia that should be further explored and assessed perhaps with psychology team. Patient reports many medication trials but most beneficial has been klonopin. Will continue benadryl nightly. Additionally patient's weight loss since her MI is concerning, as she currently has a BMI of 15.3. At this time appetite appears related to depressive symptoms but cannot rule out anorexia nervosa. Evaluated by nutrition. The patient continues to be agreeable to a voluntary inpatient psychiatric hospitalization for medication optimization and establishment of outpatient follow-up.  Patient to be discharged Monday.     PLAN  1. Voluntary Admission to [facility]  2. No psychiatric indication for CO 1:1 observation  3. D/c mirtazapine (Remeron) 7.5 mg PO qhs  4. Continue benadryl 25 mg po nightly   5. Increase olanzapine (Zyprexa) 5 mg PO nightly for sleep/mood  6. Hold home clonazepam (Klonopin) 1 mg PO BID for insomnia given associated risk of benzodiazapine for risk of falls and oversedation due to patient's age  7. CIWA monitoring for benzodiazepine withdrawal; can consider restarting clonazepam (Klonopin) 0.5 mg PO q12h PRN for benzodiazepine withdrawal with plan to taper; will continue to monitor  8.  Hold home Vilazodone (Viibryd)   9.  PRNs: haloperidol (Haldol) 2 mg PO/IM q6h PRN for agitation    ;;07/06: met with patient and her daughter and later with MSW.   Patient slept well with low dose Remeron 7.5mg and staff reported  no behavorial issues but unclear why patient received Haldol last night .  Patient would consider IOP for aftercare.  Need to monitor patient on minimal medications at this time to obtain some sense of baseline and consider need for appropriate antidepressant ; however this may need to ultimately be done as an outpatient.   Not endorsing  suicidal or homicidal ideation intent or plans; no mention of auditory or visual hallucinations .  Alert; oriented; cognition intact; speech clear; no tremor or evidence of movement impairment. Thinking is congruent with affect; no peculiarities of thinking or language use.   ;;07/08:  Patient reports improvement in mood but states that she has experienced increased anxiety at the thought or remaining on [facility].  She reports sleep has improved with olanzapine 2.5 mg PO qhs along with mirtazapine 7.5 mg PO qHs.  The patient denies SI/HI, intent, plan and AVH.   ;;07/10: poor early sleep; raising Olanzepine to 5mg at night; continue neuro w/u;  for transition to IOP.

## 2023-07-14 NOTE — BH INPATIENT PSYCHIATRY PROGRESS NOTE - NSTXDEPRESDATEEST_PSY_ALL_CORE
03-Jul-2023

## 2023-07-14 NOTE — BH INPATIENT PSYCHIATRY PROGRESS NOTE - NSTXDEPRESINTERMD_PSY_ALL_CORE
mirtazapine Zyprexa and possibly Zoloft  psychopharmacology 15 minutes a day   

## 2023-07-14 NOTE — BH INPATIENT PSYCHIATRY PROGRESS NOTE - PRN MEDS
MEDICATIONS  (PRN):  acetaminophen     Tablet .. 650 milliGRAM(s) Oral every 6 hours PRN Moderate Pain (4 - 6)  aluminum hydroxide/magnesium hydroxide/simethicone Suspension 30 milliLiter(s) Oral every 4 hours PRN Dyspepsia  
MEDICATIONS  (PRN):  aluminum hydroxide/magnesium hydroxide/simethicone Suspension 30 milliLiter(s) Oral every 4 hours PRN Dyspepsia  
MEDICATIONS  (PRN):  acetaminophen     Tablet .. 650 milliGRAM(s) Oral every 6 hours PRN Moderate Pain (4 - 6)  aluminum hydroxide/magnesium hydroxide/simethicone Suspension 30 milliLiter(s) Oral every 4 hours PRN Dyspepsia  
MEDICATIONS  (PRN):  aluminum hydroxide/magnesium hydroxide/simethicone Suspension 30 milliLiter(s) Oral every 4 hours PRN Dyspepsia  haloperidol     Tablet 2 milliGRAM(s) Oral every 6 hours PRN agitation  
MEDICATIONS  (PRN):  aluminum hydroxide/magnesium hydroxide/simethicone Suspension 30 milliLiter(s) Oral every 4 hours PRN Dyspepsia  
MEDICATIONS  (PRN):  acetaminophen     Tablet .. 650 milliGRAM(s) Oral every 6 hours PRN Moderate Pain (4 - 6)  aluminum hydroxide/magnesium hydroxide/simethicone Suspension 30 milliLiter(s) Oral every 4 hours PRN Dyspepsia  
MEDICATIONS  (PRN):  aluminum hydroxide/magnesium hydroxide/simethicone Suspension 30 milliLiter(s) Oral every 4 hours PRN Dyspepsia  haloperidol     Tablet 2 milliGRAM(s) Oral every 6 hours PRN agitation  
MEDICATIONS  (PRN):  aluminum hydroxide/magnesium hydroxide/simethicone Suspension 30 milliLiter(s) Oral every 4 hours PRN Dyspepsia  
MEDICATIONS  (PRN):  aluminum hydroxide/magnesium hydroxide/simethicone Suspension 30 milliLiter(s) Oral every 4 hours PRN Dyspepsia

## 2023-07-14 NOTE — BH INPATIENT PSYCHIATRY PROGRESS NOTE - NSBHATTESTTYPEVISIT_PSY_A_CORE
Attending Only
Attending with Resident/Fellow/Student
Attending with Resident/Fellow/Student
Attending Only
Attending Only
Attending with Resident/Fellow/Student

## 2023-07-14 NOTE — BH INPATIENT PSYCHIATRY PROGRESS NOTE - NSTXCOPEDATEEST_PSY_ALL_CORE
10-Jul-2023
05-Jul-2023
10-Jul-2023
05-Jul-2023

## 2023-07-14 NOTE — BH INPATIENT PSYCHIATRY PROGRESS NOTE - NSBHFUPINTERVALHXFT_PSY_A_CORE
Patient reports overall improved sleep but did have trouble sleeping last night. Notes that the lower dose of gabapentin (100 mg) was beneficial for her anxiety. Patient is excited that the PHP application was resubmitted. Denies any SI/I/P, HI/I/P and VH/AH.

## 2023-07-14 NOTE — PROGRESS NOTE ADULT - SUBJECTIVE AND OBJECTIVE BOX
SUBJECTIVE / INTERVAL HPI: Patient seen and examined at bedside. She states she feels cold, but has no pain in her groin at this time and does not feel she wants it checked at this time.     VITAL SIGNS:  Vital Signs Last 24 Hrs  T(C): 36.6 (14 Jul 2023 08:08), Max: 36.8 (13 Jul 2023 20:48)  T(F): 97.8 (14 Jul 2023 08:08), Max: 98.3 (13 Jul 2023 20:48)  HR: 84 (14 Jul 2023 08:08) (82 - 84)  BP: 124/54 (14 Jul 2023 08:08) (107/67 - 130/58)  BP(mean): --  RR: 18 (14 Jul 2023 08:08) (18 - 18)  SpO2: 98% (14 Jul 2023 08:08) (98% - 100%)    Parameters below as of 14 Jul 2023 08:08  Patient On (Oxygen Delivery Method): room air        PHYSICAL EXAM:    General: WDWN  HEENT: NC/AT; PERRL, anicteric sclera; MMM  Neck: supple  Cardiovascular: +S1/S2; RRR  Respiratory: CTA B/L; no W/R/R  Neurological: AAOx3; no focal deficits    MEDICATIONS:  MEDICATIONS  (STANDING):  atorvastatin 10 milliGRAM(s) Oral at bedtime  carvedilol 3.125 milliGRAM(s) Oral every 12 hours  cholecalciferol 2000 Unit(s) Oral daily  clopidogrel Tablet 75 milliGRAM(s) Oral daily  diphenhydrAMINE 25 milliGRAM(s) Oral at bedtime  levothyroxine 25 MICROGram(s) Oral daily  melatonin 3 milliGRAM(s) Oral at bedtime  OLANZapine 5 milliGRAM(s) Oral at bedtime  polyethylene glycol 3350 17 Gram(s) Oral at bedtime  sacubitril 24 mG/valsartan 26 mG 1 Tablet(s) Oral <User Schedule>    MEDICATIONS  (PRN):  acetaminophen     Tablet .. 650 milliGRAM(s) Oral every 6 hours PRN Moderate Pain (4 - 6)  aluminum hydroxide/magnesium hydroxide/simethicone Suspension 30 milliLiter(s) Oral every 4 hours PRN Dyspepsia      ALLERGIES:  Allergies    codeine (Vomiting)    Intolerances        LABS:              CAPILLARY BLOOD GLUCOSE          RADIOLOGY & ADDITIONAL TESTS: Reviewed.    ASSESSMENT:    PLAN:

## 2023-07-14 NOTE — BH INPATIENT PSYCHIATRY PROGRESS NOTE - NSBHATTESTCOMMENTATTENDFT_PSY_A_CORE
;;07/11: Neuro w/u to date negative; attempting to engage neurology consultation; Not endorsing  suicidal or homicidal ideation intent or plans; no mention of auditory or visual hallucinations ; early insomnia; MOCA would be useful but Alert; oriented; cognition intact; speech clear; c/o "cold hands" ; switching to benadryl for insomnia and stopping Remeron as not useful at this time but maintaining Zyprexa at low dose (5mg at night) as potential helpful for sleep and mood.  Transition to aftercare.  Patient indicated in discussion yesterday evening with son that she valued her personal time and was a little skeptical of a PHP.    
;;07/05: Fund of knowledge intact; registers and recalls 3/3;  oriented x 3; recalls past events; alert;oriented x3; Alert; oriented; cognition intact; speech clear; no tremor or evidence of movement impairment. Not endorsing  suicidal or homicidal ideation intent or plans; no mention of auditory or visual hallucinations ; Thinking is congruent with affect; no peculiarities of thinking or language use. Fair to good eye contact; speech clear spontaneous and normal volume ;    discussed at length medication issues; will start with LOW dose of mirtazapine at 7.5mg which will promote sleep (and appetite) and NOT be activating.  Will then address mood issues after psychological assessment.  
;;07/10:  early insomnia; Not endorsing  suicidal or homicidal ideation intent or plans; no mention of auditory or visual hallucinations ; anxious ;pleasant; Alert; oriented; cognition intact; speech clear; slight tremor at rest; feels cold; cold hands .   raising Olanzepine to 5mg po   Syphilis Screen (07.09.23 @ 12:12);   Treponema Pallidum Antibody Interpretation: Negative: ; Vitamin B12, Serum (07.09.23 @ 12:12)    Vitamin B12, Serum: >2000 pg/mL; Folate, Serum: >20.0 ng/mL (07.09.23 @ 12:12);   ACC: [id number] EXAM:  CT BRAIN   ORDERED BY: DELORIS FERREIRA ; PROCEDURE DATE:  07/01/2023  FINDINGS: The CT examination demonstrates age-appropriate volume loss. ;There is no midline shift or extra axial collections. The gray white ;differentiation appears within normal limits. There is no intracranial ;hemorrhage or acute transcortical infarct. There are mild patchy areas of ;hypodensity within the periventricular white matter which is nonspecific ;and may represent the sequela of small vessel ischemic disease in this ;patient. ; ;The bony windows demonstrates no fractures. The lenses are absent which ;may be secondary to prior cataract surgery. The visualized paranasal ;sinuses are within normal limits. The mastoid air cells are well aerated. ; ;Limited evaluation of the facial regions demonstrates bilateral swelling ;and clinical correlation is recommended. ; ;IMPRESSION: No intracranial hemorrhage or acute transcortical infarct;  neuro f/u;  transition to geriatric IOP.        
;;07/12: spoke with son (twice); patient and family agree that patient should remain in hospital until a PHP appointment available based on depressed mood; confusion; and insomnia (although slept last night with substitution of benadryl for mirtazapine);  Not endorsing  suicidal or homicidal ideation intent or plans; no mention of auditory or visual hallucinations ; disappointed not leaving however;  R groin pain to be evaluated by medicine; Tylenol for pain.  No change in meds at this time. 
;;07/14: sleep was poor with early insomnia; Not endorsing  suicidal or homicidal ideation intent or plans; no mention of auditory or visual hallucinations ; met with patient and son ; glad that application being resubmitted but expecting discharge 7/17 in afternoon.  i&j fair  : aware of medications and acknowledges symptoms discussed issues related with Mirtazapine and confusion but need nevertheless to continue evaluation and medication management after release. 
;;07/07:  Not endorsing  suicidal or homicidal ideation intent or plans; no mention of auditory or visual hallucinations ; anxious; would obtain labs e.g. b12 folate rpr head ct and consult neuro for possible r/o dementia given h/o confusion but would add low dose Zyprexa to regime ; contact prior treater to discuss rationale for past meds.  did speak with son (implied consent) for family discussion re aftercare planning.

## 2023-07-14 NOTE — BH INPATIENT PSYCHIATRY PROGRESS NOTE - NSBHMSEBODY_PSY_A_CORE
Underweight

## 2023-07-14 NOTE — PROGRESS NOTE ADULT - ASSESSMENT
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  Medicine consulted R groin bruising.    #groin bruise  patient with superficial mass on R groin and hematoma which appears old -, was improving yesterday  patient did not want it examined today   likely 2/2 an old trauma, appears to not be recent injury  patient on plavix at this time, possible cause of bruise  platelet count stable at this time  -continue to monitor     Appreciate the consult, medicine will sign off at this time.   Please reconsult as needed.   Note not finalized until attending attestation complete.

## 2023-07-14 NOTE — BH INPATIENT PSYCHIATRY PROGRESS NOTE - NSICDXBHTERTIARYDX_PSY_ALL_CORE
R/O Anorexia nervosa   F50.00  R/O Cluster B personality disorder in adult   F60.9  

## 2023-07-14 NOTE — BH INPATIENT PSYCHIATRY PROGRESS NOTE - NSTXDEPRESGOAL_PSY_ALL_CORE
Report using a coping skill to overcome sadness and worry in order to socialize with peers daily

## 2023-07-14 NOTE — PROGRESS NOTE ADULT - ATTENDING COMMENTS
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    pt seen and examined with Dr. King  she is ambulating, less pain in the right grion, no other new bruises.     Right groin hematoma- looks old with superficial lump ( mobile and located in the sub-cutaneous tissue )about 2 cm x 1 cm -could be lipoma vs hematoma ), no abdominal pain, no localized tenderness in abdomen, not lymphadenopathy , could be due to old trauma while on plavix.   platelet count wnl, renal function ok.  exam on 7/12- resolving hematoma-   would monitor clinically , no intervention.    Thank you for allowing medicine to participate in the care, dw Dr. King.
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    pt seen with Dr. King  ambulating , stated no pain when ambulating and at rest,  only mild tenderness, dose not want examined today.    Right groin hematoma- looks old with superficial lump ( mobile and located in the sub-cutaneous tissue )about 2 cm x 1 cm -could be lipoma vs hematoma ), no abdominal pain, no localized tenderness in abdomen, not lymphadenopathy , could be due to old trauma while on plavix.   platelet count wnl, renal function ok.  exam on 7/12- resolving hematoma-   would monitor clinically , no intervention.    Thank you for allowing medicine to participate in the care, dw Dr. King. medicine will sign off , please reconsult as needed.
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    pt seen and examined with Dr. King  she is ambulating,    Right groin hematoma- looks old with superficial lump ( mobile and located in the sub-cutaneous tissue )about 2 cm x 1 cm -could be lipoma vs hematoma ), no abdominal pain, no localized tenderness in abdomen, not lymphadenopathy , could be due to old trauma while on plavix.   platelet count wnl, renal function ok.  would monitor clinically , no intervention.    Thank you for allowing medicine to participate in the care, jennifer King.

## 2023-07-14 NOTE — BH INPATIENT PSYCHIATRY PROGRESS NOTE - NSBHMETABOLIC_PSY_ALL_CORE_FT
BMI: BMI (kg/m2): 15.3 (07-03-23 @ 20:44)  HbA1c: A1C with Estimated Average Glucose Result: 5.1 % (07-06-23 @ 05:30)    Glucose: POCT Blood Glucose.: 114 mg/dL (07-01-23 @ 15:50)    BP: 124/54 (07-14-23 @ 08:08) (107/67 - 146/66)  Lipid Panel: Date/Time: 07-06-23 @ 05:30  Cholesterol, Serum: 140  Direct LDL: --  HDL Cholesterol, Serum: 53  Total Cholesterol/HDL Ration Measurement: --  Triglycerides, Serum: 112   BMI: BMI (kg/m2): 15.3 (07-03-23 @ 20:44)  HbA1c: A1C with Estimated Average Glucose Result: 5.1 % (07-06-23 @ 05:30)    Glucose: POCT Blood Glucose.: 114 mg/dL (07-01-23 @ 15:50)    BP: 120/62 (07-14-23 @ 16:38) (107/67 - 130/58)  Lipid Panel: Date/Time: 07-06-23 @ 05:30  Cholesterol, Serum: 140  Direct LDL: --  HDL Cholesterol, Serum: 53  Total Cholesterol/HDL Ration Measurement: --  Triglycerides, Serum: 112

## 2023-07-14 NOTE — BH INPATIENT PSYCHIATRY PROGRESS NOTE - NSICDXBHSECONDARYDX_PSY_ALL_CORE
Agoraphobia   F40.00  

## 2023-07-14 NOTE — BH INPATIENT PSYCHIATRY PROGRESS NOTE - NSBHCHARTREVIEWVS_PSY_A_CORE FT
Vital Signs Last 24 Hrs  T(C): 36.6 (07-14-23 @ 08:08), Max: 36.8 (07-13-23 @ 20:48)  T(F): 97.8 (07-14-23 @ 08:08), Max: 98.3 (07-13-23 @ 20:48)  HR: 84 (07-14-23 @ 08:08) (82 - 84)  BP: 124/54 (07-14-23 @ 08:08) (107/67 - 130/58)  BP(mean): --  RR: 18 (07-14-23 @ 08:08) (18 - 18)  SpO2: 98% (07-14-23 @ 08:08) (98% - 100%)     Vital Signs Last 24 Hrs  T(C): 36.7 (07-14-23 @ 16:38), Max: 36.8 (07-13-23 @ 20:48)  T(F): 98 (07-14-23 @ 16:38), Max: 98.3 (07-13-23 @ 20:48)  HR: 84 (07-14-23 @ 16:38) (82 - 84)  BP: 120/62 (07-14-23 @ 16:38) (107/67 - 124/54)  BP(mean): --  RR: 18 (07-14-23 @ 16:38) (18 - 18)  SpO2: 98% (07-14-23 @ 16:38) (98% - 100%)

## 2023-07-14 NOTE — BH INPATIENT PSYCHIATRY PROGRESS NOTE - NSTXCOPEDATETRGT_PSY_ALL_CORE
12-Jul-2023
17-Jul-2023
12-Jul-2023
17-Jul-2023
10-Jul-2023
17-Jul-2023
12-Jul-2023
12-Jul-2023
17-Jul-2023

## 2023-07-15 RX ADMIN — OLANZAPINE 5 MILLIGRAM(S): 15 TABLET, FILM COATED ORAL at 21:14

## 2023-07-15 RX ADMIN — ATORVASTATIN CALCIUM 10 MILLIGRAM(S): 80 TABLET, FILM COATED ORAL at 21:14

## 2023-07-15 RX ADMIN — CARVEDILOL PHOSPHATE 3.12 MILLIGRAM(S): 80 CAPSULE, EXTENDED RELEASE ORAL at 21:42

## 2023-07-15 RX ADMIN — CLOPIDOGREL BISULFATE 75 MILLIGRAM(S): 75 TABLET, FILM COATED ORAL at 10:09

## 2023-07-15 RX ADMIN — CARVEDILOL PHOSPHATE 3.12 MILLIGRAM(S): 80 CAPSULE, EXTENDED RELEASE ORAL at 10:09

## 2023-07-15 RX ADMIN — Medication 25 MILLIGRAM(S): at 21:14

## 2023-07-15 RX ADMIN — Medication 3 MILLIGRAM(S): at 21:14

## 2023-07-15 RX ADMIN — SACUBITRIL AND VALSARTAN 1 TABLET(S): 24; 26 TABLET, FILM COATED ORAL at 10:10

## 2023-07-15 RX ADMIN — Medication 2000 UNIT(S): at 10:09

## 2023-07-15 RX ADMIN — GABAPENTIN 100 MILLIGRAM(S): 400 CAPSULE ORAL at 21:55

## 2023-07-15 RX ADMIN — Medication 25 MICROGRAM(S): at 07:07

## 2023-07-15 NOTE — BH SAFETY PLAN - WARNING SIGN 1
Pt. completed a written safety plan and was given a copy to take with her. Additional copies can be found in pt.'s chart.

## 2023-07-16 RX ORDER — SACUBITRIL AND VALSARTAN 24; 26 MG/1; MG/1
1 TABLET, FILM COATED ORAL
Qty: 30 | Refills: 0
Start: 2023-07-16 | End: 2023-08-14

## 2023-07-16 RX ORDER — CHOLECALCIFEROL (VITAMIN D3) 125 MCG
1 CAPSULE ORAL
Refills: 0 | DISCHARGE

## 2023-07-16 RX ORDER — CARVEDILOL PHOSPHATE 80 MG/1
1 CAPSULE, EXTENDED RELEASE ORAL
Refills: 0 | DISCHARGE

## 2023-07-16 RX ORDER — LEVOTHYROXINE SODIUM 125 MCG
1 TABLET ORAL
Refills: 0 | DISCHARGE

## 2023-07-16 RX ORDER — LEVOTHYROXINE SODIUM 125 MCG
1 TABLET ORAL
Qty: 30 | Refills: 0
Start: 2023-07-16 | End: 2023-08-14

## 2023-07-16 RX ORDER — CLOPIDOGREL BISULFATE 75 MG/1
1 TABLET, FILM COATED ORAL
Qty: 30 | Refills: 0
Start: 2023-07-16 | End: 2023-08-14

## 2023-07-16 RX ORDER — CHOLECALCIFEROL (VITAMIN D3) 125 MCG
1 CAPSULE ORAL
Qty: 30 | Refills: 0
Start: 2023-07-16 | End: 2023-08-14

## 2023-07-16 RX ORDER — MIRTAZAPINE 45 MG/1
2 TABLET, ORALLY DISINTEGRATING ORAL
Refills: 0 | DISCHARGE

## 2023-07-16 RX ORDER — OLANZAPINE 15 MG/1
1 TABLET, FILM COATED ORAL
Qty: 30 | Refills: 0
Start: 2023-07-16 | End: 2023-08-14

## 2023-07-16 RX ORDER — GABAPENTIN 400 MG/1
50 CAPSULE ORAL EVERY 24 HOURS
Refills: 0 | Status: DISCONTINUED | OUTPATIENT
Start: 2023-07-16 | End: 2023-07-17

## 2023-07-16 RX ORDER — GABAPENTIN 400 MG/1
1 CAPSULE ORAL
Qty: 15 | Refills: 0
Start: 2023-07-16 | End: 2023-07-30

## 2023-07-16 RX ORDER — CARVEDILOL PHOSPHATE 80 MG/1
1 CAPSULE, EXTENDED RELEASE ORAL
Qty: 30 | Refills: 0
Start: 2023-07-16 | End: 2023-08-14

## 2023-07-16 RX ORDER — ATORVASTATIN CALCIUM 80 MG/1
1 TABLET, FILM COATED ORAL
Qty: 30 | Refills: 0
Start: 2023-07-16 | End: 2023-08-14

## 2023-07-16 RX ORDER — DIPHENHYDRAMINE HCL 50 MG
1 CAPSULE ORAL
Qty: 30 | Refills: 0
Start: 2023-07-16 | End: 2023-08-14

## 2023-07-16 RX ORDER — GABAPENTIN 400 MG/1
200 CAPSULE ORAL AT BEDTIME
Refills: 0 | Status: DISCONTINUED | OUTPATIENT
Start: 2023-07-16 | End: 2023-07-17

## 2023-07-16 RX ADMIN — CLOPIDOGREL BISULFATE 75 MILLIGRAM(S): 75 TABLET, FILM COATED ORAL at 10:39

## 2023-07-16 RX ADMIN — ATORVASTATIN CALCIUM 10 MILLIGRAM(S): 80 TABLET, FILM COATED ORAL at 21:28

## 2023-07-16 RX ADMIN — SACUBITRIL AND VALSARTAN 1 TABLET(S): 24; 26 TABLET, FILM COATED ORAL at 10:40

## 2023-07-16 RX ADMIN — OLANZAPINE 5 MILLIGRAM(S): 15 TABLET, FILM COATED ORAL at 21:27

## 2023-07-16 RX ADMIN — Medication 25 MILLIGRAM(S): at 21:27

## 2023-07-16 RX ADMIN — GABAPENTIN 200 MILLIGRAM(S): 400 CAPSULE ORAL at 21:28

## 2023-07-16 RX ADMIN — CARVEDILOL PHOSPHATE 3.12 MILLIGRAM(S): 80 CAPSULE, EXTENDED RELEASE ORAL at 10:40

## 2023-07-16 RX ADMIN — CARVEDILOL PHOSPHATE 3.12 MILLIGRAM(S): 80 CAPSULE, EXTENDED RELEASE ORAL at 21:27

## 2023-07-16 RX ADMIN — Medication 25 MICROGRAM(S): at 07:18

## 2023-07-16 RX ADMIN — Medication 3 MILLIGRAM(S): at 21:28

## 2023-07-16 RX ADMIN — Medication 2000 UNIT(S): at 10:40

## 2023-07-16 NOTE — BH CHART NOTE - NSEVENTNOTEFT_PSY_ALL_CORE
RN asked writer to evaluate bruise noted on patient's right groin area. Writer conducted exam with RN in the room. There is contusion present, approximately 5 inches in diameter, on the anterior / right portion of patient's pelvis. Pt states she first noticed it yesterday. Pt ambulated without issue or pain today. Pt doesn't feel pain unless she pushes very heavily on the bruise with no tenderness to soft or medium touch (which was consistent with writer's exam). Pt thinks she may have bumped into something. Denies history of mysterious or unexplained bruises. Pt states that the bruise improved since yesterday. Vitals reviewed and stable today. Given that patient in clopidogrel, will order CBC for tomorrow to trend hb. 
Patient c/o gabapentin 100mg PRN ineffective for insomnia although 300mg too sedating. Chart reviewed. Discussed plan for gabapentin 200mg qhs and PRN gabapentin 50mg additional dose if needed. No other psychiatric or medical complaints at this time.

## 2023-07-17 VITALS
TEMPERATURE: 98 F | RESPIRATION RATE: 18 BRPM | SYSTOLIC BLOOD PRESSURE: 115 MMHG | DIASTOLIC BLOOD PRESSURE: 70 MMHG | OXYGEN SATURATION: 97 % | HEART RATE: 93 BPM

## 2023-07-17 PROCEDURE — 99238 HOSP IP/OBS DSCHRG MGMT 30/<: CPT

## 2023-07-17 RX ORDER — GABAPENTIN 400 MG/1
1 CAPSULE ORAL
Qty: 15 | Refills: 0
Start: 2023-07-17 | End: 2023-07-31

## 2023-07-17 RX ORDER — CHOLECALCIFEROL (VITAMIN D3) 125 MCG
1 CAPSULE ORAL
Qty: 30 | Refills: 0
Start: 2023-07-17 | End: 2023-08-15

## 2023-07-17 RX ORDER — ATORVASTATIN CALCIUM 80 MG/1
1 TABLET, FILM COATED ORAL
Qty: 30 | Refills: 0
Start: 2023-07-17 | End: 2023-08-15

## 2023-07-17 RX ORDER — DIPHENHYDRAMINE HCL 50 MG
1 CAPSULE ORAL
Qty: 30 | Refills: 0
Start: 2023-07-17 | End: 2023-08-15

## 2023-07-17 RX ORDER — CLOPIDOGREL BISULFATE 75 MG/1
1 TABLET, FILM COATED ORAL
Qty: 30 | Refills: 0
Start: 2023-07-17 | End: 2023-08-15

## 2023-07-17 RX ORDER — OLANZAPINE 15 MG/1
1 TABLET, FILM COATED ORAL
Qty: 30 | Refills: 0
Start: 2023-07-17 | End: 2023-08-15

## 2023-07-17 RX ORDER — CARVEDILOL PHOSPHATE 80 MG/1
1 CAPSULE, EXTENDED RELEASE ORAL
Qty: 30 | Refills: 0
Start: 2023-07-17 | End: 2023-08-15

## 2023-07-17 RX ORDER — SACUBITRIL AND VALSARTAN 24; 26 MG/1; MG/1
1 TABLET, FILM COATED ORAL
Qty: 30 | Refills: 0
Start: 2023-07-17 | End: 2023-08-15

## 2023-07-17 RX ORDER — LEVOTHYROXINE SODIUM 125 MCG
1 TABLET ORAL
Qty: 30 | Refills: 0
Start: 2023-07-17 | End: 2023-08-15

## 2023-07-17 RX ADMIN — CARVEDILOL PHOSPHATE 3.12 MILLIGRAM(S): 80 CAPSULE, EXTENDED RELEASE ORAL at 09:33

## 2023-07-17 RX ADMIN — CLOPIDOGREL BISULFATE 75 MILLIGRAM(S): 75 TABLET, FILM COATED ORAL at 09:33

## 2023-07-17 RX ADMIN — SACUBITRIL AND VALSARTAN 1 TABLET(S): 24; 26 TABLET, FILM COATED ORAL at 09:33

## 2023-07-17 RX ADMIN — Medication 25 MICROGRAM(S): at 07:12

## 2023-07-17 RX ADMIN — Medication 2000 UNIT(S): at 09:33

## 2023-07-17 NOTE — BH DISCHARGE NOTE NURSING/SOCIAL WORK/PSYCH REHAB - NSTOBACCOSMKCESSPRO_PSY_ALL_CORE
.  [facility] Smoker's Quitline   [phone] ([phone]) no loss of consciousness, no gait abnormality, no headache, no sensory deficits, and no weakness. + dizziness

## 2023-07-17 NOTE — BH DISCHARGE NOTE NURSING/SOCIAL WORK/PSYCH REHAB - NSDCADDINFO1FT_PSY_ALL_CORE
Psychiatry appointment scheduled TUESDAY 7/18 at 9am with Dr. Hicks. Passed the building with [address]160 on the awning until they sees a ramp and short set of stairs. Please bring photo ID and insurance card(s).

## 2023-07-17 NOTE — BH DISCHARGE NOTE NURSING/SOCIAL WORK/PSYCH REHAB - NSDCCRTYPESERV_GEN_ALL_CORE_FT
The Center @ [facility] is currently open Monday-Friday from 8:00 a.m. - 6:00 p.m., and Saturday-Sunday from 8:00 a.m. - 3:00 p.m. The [facility] @ [employer] is open to members every Monday-Friday from 9:00 a.m. - 4:00 p.m. The Center offers members a packed schedule of at least 20 daily activities. Current offerings include fitness classes, including swimming in the [facility] House pool; language classes; arts, crafts and culture classes; games; computer classes and additional technology offerings; and several classes taught in Chinese for our Chinese-language speaking community.

## 2023-07-17 NOTE — BH DISCHARGE NOTE NURSING/SOCIAL WORK/PSYCH REHAB - NSBHDCAPPT2DT_PSY_A_CORE
20-Jul-2023 16:00 Airway patent, Nasal mucosa clear. Mouth with normal mucosa. Throat has no vesicles, no oropharyngeal exudates and uvula is midline.

## 2023-07-17 NOTE — BH DISCHARGE NOTE NURSING/SOCIAL WORK/PSYCH REHAB - PATIENT PORTAL LINK FT
You can access the FollowMyHealth Patient Portal offered by [facility] by registering at the following website: [email or web address]. By joining [employer]’s FollowMyHealth portal, you will also be able to view your health information using other applications (apps) compatible with our system.

## 2023-07-17 NOTE — BH DISCHARGE NOTE NURSING/SOCIAL WORK/PSYCH REHAB - NSDCADDINFO2FT_PSY_ALL_CORE
Therapy appointment scheduled with established provider, Thursday 7/20 at 4pm. This is an IN PERSON appointment.

## 2023-07-17 NOTE — BH DISCHARGE NOTE NURSING/SOCIAL WORK/PSYCH REHAB - NSDCPRGOAL_PSY_ALL_CORE
Pt attended select groups during admission.  Pt has demonstrated moderate range of affect with fluctuating mood.  Pt has been pleasant at times but has sometimes been irritable with low frustration tolerance.  Pt has been social with peers and often appears bright when engaged with others.  Pt has been future-focused and looking forward to discharge.  Pt completed a safety plan.

## 2023-07-18 NOTE — BH SOCIAL WORK CONFIRMATION FOLLOW UP NOTE - NSLINKEDTOLOC_PSY_ALL_CORE
[facility]  18-Jul-2023 09:00  [address] [place] [address] office (between [address])  [phone]

## 2023-07-18 NOTE — BH SOCIAL WORK CONFIRMATION FOLLOW UP NOTE - NSCOMMENTS_PSY_ALL_CORE
Caring call made to pt who stated she is doing well and stated she attending appt. today. She also noted, her feet has been swollen since DC. Advised to seek medical attention if needed. Denies SI/HI/AVH. Linkage call made to [facility] ([phone]) and staff confirm pt attended appointment.

## 2023-07-20 DIAGNOSIS — F50.00 ANOREXIA NERVOSA, UNSPECIFIED: ICD-10-CM

## 2023-07-20 DIAGNOSIS — T43.025A ADVERSE EFFECT OF TETRACYCLIC ANTIDEPRESSANTS, INITIAL ENCOUNTER: ICD-10-CM

## 2023-07-20 DIAGNOSIS — F32.A DEPRESSION, UNSPECIFIED: ICD-10-CM

## 2023-07-20 DIAGNOSIS — F40.00 AGORAPHOBIA, UNSPECIFIED: ICD-10-CM

## 2023-07-20 DIAGNOSIS — F33.9 MAJOR DEPRESSIVE DISORDER, RECURRENT, UNSPECIFIED: ICD-10-CM

## 2023-07-20 DIAGNOSIS — E43 UNSPECIFIED SEVERE PROTEIN-CALORIE MALNUTRITION: ICD-10-CM
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
## 2023-07-20 DIAGNOSIS — G92.8 OTHER TOXIC ENCEPHALOPATHY: ICD-10-CM

## 2023-07-20 DIAGNOSIS — Z88.5 ALLERGY STATUS TO NARCOTIC AGENT: ICD-10-CM

## 2023-07-20 DIAGNOSIS — E78.5 HYPERLIPIDEMIA, UNSPECIFIED: ICD-10-CM

## 2023-07-20 DIAGNOSIS — F60.9 PERSONALITY DISORDER, UNSPECIFIED: ICD-10-CM

## 2023-07-20 DIAGNOSIS — I25.2 OLD MYOCARDIAL INFARCTION: ICD-10-CM

## 2023-07-20 DIAGNOSIS — S30.1XXA CONTUSION OF ABDOMINAL WALL, INITIAL ENCOUNTER: ICD-10-CM

## 2023-07-20 DIAGNOSIS — F41.9 ANXIETY DISORDER, UNSPECIFIED: ICD-10-CM

## 2023-07-20 DIAGNOSIS — I25.10 ATHEROSCLEROTIC HEART DISEASE OF NATIVE CORONARY ARTERY WITHOUT ANGINA PECTORIS: ICD-10-CM
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
## 2023-08-08 PROCEDURE — 85027 COMPLETE CBC AUTOMATED: CPT

## 2023-08-08 PROCEDURE — 82746 ASSAY OF FOLIC ACID SERUM: CPT

## 2023-08-08 PROCEDURE — 80061 LIPID PANEL: CPT

## 2023-08-08 PROCEDURE — 86780 TREPONEMA PALLIDUM: CPT

## 2023-08-08 PROCEDURE — 87389 HIV-1 AG W/HIV-1&-2 AB AG IA: CPT

## 2023-08-08 PROCEDURE — 36415 COLL VENOUS BLD VENIPUNCTURE: CPT

## 2023-08-08 PROCEDURE — 83036 HEMOGLOBIN GLYCOSYLATED A1C: CPT

## 2023-08-08 PROCEDURE — 82607 VITAMIN B-12: CPT

## 2024-01-29 NOTE — BH CONSULTATION LIAISON PROGRESS NOTE - NSBHFUPREASONCONS_PSY_A_CORE
Last office visit 7/20/23 upcoming 7/22/24 - not sure if you'd like to have a follow up with him regarding this  
No care due was identified.  [facility] Embedded Care Due Messages. Reference number: [id number].   1/28/2024 11:19:30 AM CST  
suicidality

## 2024-11-04 NOTE — ED PROVIDER NOTE - DATE/TIME 1
What Is The Reason For Today's Visit?: Full Body Skin Examination What Is The Reason For Today's Visit? (Being Monitored For X): surveillance against the recurrence of atypical nevi What Type Of Note Output Would You Prefer (Optional)?: Bullet Format Additional History: Pt states spot that is itchy on upper left chest near shoulder.\\n 01-Jul-2023 15:52

## 2024-12-17 NOTE — BH CONSULTATION LIAISON PROGRESS NOTE - NSBHCONSFOLLOWNEEDS_PSY_ALL_CORE
[facility] Transitions Program. [employer] [id number] - REV 01/24.      Needs further psych safety assessment prior to discharge